# Patient Record
Sex: FEMALE | Race: ASIAN | NOT HISPANIC OR LATINO | Employment: UNEMPLOYED | URBAN - METROPOLITAN AREA
[De-identification: names, ages, dates, MRNs, and addresses within clinical notes are randomized per-mention and may not be internally consistent; named-entity substitution may affect disease eponyms.]

---

## 2017-01-25 ENCOUNTER — OFFICE VISIT (OUTPATIENT)
Dept: FAMILY MEDICINE | Facility: CLINIC | Age: 57
End: 2017-01-25
Payer: COMMERCIAL

## 2017-01-25 VITALS
SYSTOLIC BLOOD PRESSURE: 106 MMHG | HEIGHT: 55 IN | WEIGHT: 86.8 LBS | OXYGEN SATURATION: 97 % | BODY MASS INDEX: 20.09 KG/M2 | HEART RATE: 65 BPM | TEMPERATURE: 97.1 F | DIASTOLIC BLOOD PRESSURE: 59 MMHG

## 2017-01-25 DIAGNOSIS — Z12.11 SCREEN FOR COLON CANCER: ICD-10-CM

## 2017-01-25 DIAGNOSIS — Z12.31 VISIT FOR SCREENING MAMMOGRAM: ICD-10-CM

## 2017-01-25 DIAGNOSIS — R10.33 ABDOMINAL PAIN, PERIUMBILICAL: Primary | ICD-10-CM

## 2017-01-25 DIAGNOSIS — D50.8 OTHER IRON DEFICIENCY ANEMIA: ICD-10-CM

## 2017-01-25 DIAGNOSIS — F33.1 MODERATE EPISODE OF RECURRENT MAJOR DEPRESSIVE DISORDER (H): Primary | ICD-10-CM

## 2017-01-25 PROCEDURE — 99214 OFFICE O/P EST MOD 30 MIN: CPT | Performed by: FAMILY MEDICINE

## 2017-01-25 RX ORDER — FERROUS GLUCONATE 324(38)MG
324 TABLET ORAL 2 TIMES DAILY
Qty: 100 TABLET | Refills: 1 | Status: SHIPPED | OUTPATIENT
Start: 2017-01-25 | End: 2017-04-18

## 2017-01-25 RX ORDER — BUPROPION HYDROCHLORIDE 300 MG/1
TABLET ORAL
Qty: 90 TABLET | Refills: 1 | Status: SHIPPED | OUTPATIENT
Start: 2017-01-25 | End: 2017-07-13

## 2017-01-25 NOTE — PROGRESS NOTES
"  SUBJECTIVE:                                                    Gricelda Navarro is a 56 year old female who presents to clinic today for the following health issues:      Depression Followup    Status since last visit: Worsened     See PHQ-9 for current symptoms.  Other associated symptoms: when feeling sick depression worse, when feeling good depression better. Pain in right shoulder, arm and right side head    Complicating factors:   Significant life event:  No   Current substance abuse:  None  Anxiety or Panic symptoms:  No    PHQ-9  English PHQ-9   Any Language            Amount of exercise or physical activity: None    Problems taking medications regularly: No    Medication side effects: none    Diet: low salt    Iron deficiency - taking supplement most days and needs refill.    Problem list and histories reviewed & adjusted, as indicated.  Additional history: as documented    Problem list, Medication list, Allergies, and Medical/Social/Surgical histories reviewed in EPIC and updated as appropriate.    ROS:  Constitutional, HEENT, cardiovascular, pulmonary, gi and gu systems are negative, except as otherwise noted.    OBJECTIVE:                                                    /59 mmHg  Pulse 65  Temp(Src) 97.1  F (36.2  C) (Oral)  Ht 4' 6.53\" (1.385 m)  Wt 86 lb 12.8 oz (39.372 kg)  BMI 20.53 kg/m2  SpO2 97%  LMP 12/25/2013  Breastfeeding? No  Body mass index is 20.53 kg/(m^2).  GENERAL: healthy, alert and no distress  NECK: no adenopathy, no asymmetry, masses, or scars and thyroid normal to palpation  RESP: lungs clear to auscultation - no rales, rhonchi or wheezes  CV: regular rate and rhythm, normal S1 S2, no S3 or S4, no murmur, click or rub, no peripheral edema and peripheral pulses strong  ABDOMEN: soft, nontender, no hepatosplenomegaly, no masses and bowel sounds normal  MS: no gross musculoskeletal defects noted, no edema  PSYCH: mentation appears normal and affect flat    Diagnostic Test " Results:  none      ASSESSMENT/PLAN:                                                    1. Screen for colon cancer  screening  - Fecal colorectal cancer screen (FIT); Future    2. Visit for screening mammogram  screening  - MA SCREENING DIGITAL BILAT - Future  (s+30); Future    3. Moderate episode of recurrent major depressive disorder (H)  Patient requests to move back to wellbutrin.  She does not like how she feels on cymbalta.  - buPROPion (WELLBUTRIN XL) 300 MG 24 hr tablet; TAKE 1 TABLET BY MOUTH EVERY MORNING FOR DEPRESSION//IB HNUB NOJ 1 LUB THAUM SAWV NTXOV PAB JAHAIRA NYUAB SIAB  Dispense: 90 tablet; Refill: 1    4. Other iron deficiency anemia  Refilled.  - ferrous gluconate (FERGON) 324 (38 FE) MG tablet; Take 1 tablet (324 mg) by mouth 2 times daily  Dispense: 100 tablet; Refill: 1    The uses and side effects, including black box warnings as appropriate, were discussed in detail.  All patient questions were answered.  The patient was instructed to call immediately if any side effects developed.     FUTURE APPOINTMENTS:       - Follow-up visit in 3 - 6 months.  Discussed working to improve depression with medication and therapy.  Patient would like to hold at wellbutrin for now.    Marycarmen Cuba MD  [facility]

## 2017-01-25 NOTE — MR AVS SNAPSHOT
After Visit Summary   1/25/2017    Gricelda Navarro    MRN: [id number]           Patient Information     Date Of Birth          1960        Visit Information        Provider Department      1/25/2017 9:45 AM Marycarmen Taylor MD; [employer] [facility]        Today's Diagnoses     Moderate episode of recurrent major depressive disorder (H)    -  1     Screen for colon cancer         Visit for screening mammogram         Other iron deficiency anemia           Care Instructions    Based on your medical history and these are the current health maintenance or preventive care services that you are due for (some may have been done at this visit)  Health Maintenance Due   Topic Date Due     ADVANCE DIRECTIVE PLANNING Q5 YRS (NO INBASKET)  07/12/1979     HEPATITIS C SCREENING  07/12/1979     DEPRESSION ACTION PLAN Q1 YR (NO INBASKET)  07/28/2016     MAMMO SCREEN Q2 YR (SYSTEM ASSIGNED)  08/19/2016     INFLUENZA VACCINE (SYSTEM ASSIGNED)  09/01/2016     FIT Q1 YR (NO INBASKET)  11/05/2016         At [facility], we strive to deliver an exceptional experience to you, every time we see you.    If you receive a survey in the mail, please send us back your thoughts. We really do value your feedback.    Your care team's suggested websites for health information:  Www.[facility].org : Up to date and easily searchable information on multiple topics.  Www.medlineplus.gov : medication info, interactive tutorials, watch real surgeries online  Www.familydoctor.org : good info from the Academy of Family Physicians  Www.cdc.gov : public health info, travel advisories, epidemics (H1N1)  Www.aap.org : children's health info, normal development, vaccinations  Www.health.state.[place].us : [place] dept of health, public health issues in [place], N1N1    How to contact your care team:   Team Kayla/Spirit [phone]         Pharmacy [phone]    Dr. Robert,  "Diana Izquierdo PA-C, Dr. Francisco, Cathy JENKINS CNP, Elena Villalta PA-C, Dr. Kemp, and GREGORY Blunt CNP    Team RNs: Meseret & Maribel      Clinic hours  M-Th 7 am-7 pm   Fri 7 am-5 pm.   Urgent care M-F 11 am-9 pm,   Sat/Sun 9 am-5 pm.  Pharmacy M-Th 8 am-8 pm Fri 8 am-6 pm  Sat/Sun 9 am-5 pm.     All password changes, disabled accounts, or ID changes in [employer]/MyHealth will be done by our Access Services Department.    If you need help with your account or password, call: [phone]. Clinic staff no longer has the ability to change passwords.             Follow-ups after your visit        Future tests that were ordered for you today     Open Future Orders        Priority Expected Expires Ordered    MA SCREENING DIGITAL BILAT - Future  (s+30) Routine  1/25/2018 1/25/2017    Fecal colorectal cancer screen (FIT) Routine 2/15/2017 4/19/2017 1/25/2017            Who to contact     If you have questions or need follow up information about today's clinic visit or your schedule please contact [facility] directly at [phone].  Normal or non-critical lab and imaging results will be communicated to you by Napartnerhart, letter or phone within 4 business days after the clinic has received the results. If you do not hear from us within 7 days, please contact the clinic through Napartnerhart or phone. If you have a critical or abnormal lab result, we will notify you by phone as soon as possible.  Submit refill requests through [employer] or call your pharmacy and they will forward the refill request to us. Please allow 3 business days for your refill to be completed.          Additional Information About Your Visit        [employer] Information     [employer] lets you send messages to your doctor, view your test results, renew your prescriptions, schedule appointments and more. To sign up, go to [email or web address] . Click on \"Log in\" on the left side of the screen, which will take you to the Welcome " "page. Then click on \"Sign up Now\" on the right side of the page.     You will be asked to enter the access code listed below, as well as some personal information. Please follow the directions to create your username and password.     Your access code is: [email or web address]  Expires: 2017 10:19 AM     Your access code will  in 90 days. If you need help or a new code, please call your [place] clinic or [phone].        Care EveryWhere ID     This is your Care EveryWhere ID. This could be used by other organizations to access your [place] medical records  [id number]        Your Vitals Were     Pulse Temperature Height BMI (Body Mass Index) Pulse Oximetry Last Period    65 97.1  F (36.2  C) (Oral) 4' 6.53\" (1.385 m) 20.53 kg/m2 97% 2013    Breastfeeding?                   No            Blood Pressure from Last 3 Encounters:   17 106/59   11/15/16 137/76   16 105/78    Weight from Last 3 Encounters:   17 86 lb 12.8 oz (39.372 kg)   16 87 lb 14.4 oz (39.871 kg)   10/27/16 87 lb (39.463 kg)              We Performed the Following     DEPRESSION ACTION PLAN (DAP) Order [[id number]]          Today's Medication Changes          These changes are accurate as of: 17 10:19 AM.  If you have any questions, ask your nurse or doctor.               Start taking these medicines.        Dose/Directions    buPROPion 300 MG 24 hr tablet   Commonly known as:  WELLBUTRIN XL   Used for:  Moderate episode of recurrent major depressive disorder (H)   Started by:  Marycarmen Taylor MD        TAKE 1 TABLET BY MOUTH EVERY MORNING FOR DEPRESSION//IB HNUB NOJ 1 LUB THAUM SAWV NTXOV PAB JAHAIRA NYUAB SIAB   Quantity:  90 tablet   Refills:  1         Stop taking these medicines if you haven't already. Please contact your care team if you have questions.     DULoxetine 20 MG EC capsule   Commonly known as:  CYMBALTA   Stopped by:  Marycarmen Taylor MD                Where to get " your medicines      These medications were sent to [facility], [place] - [address]  [address], [place] [address]     Phone:  [phone]    - buPROPion 300 MG 24 hr tablet  - ferrous gluconate 324 (38 FE) MG tablet             Primary Care Provider Office Phone # Fax #    Marycarmen Cici Cuba [phone]       [facility] [address]  [facility] [address]        Thank you!     Thank you for choosing [facility]  for your care. Our goal is always to provide you with excellent care. Hearing back from our patients is one way we can continue to improve our services. Please take a few minutes to complete the written survey that you may receive in the mail after your visit with us. Thank you!             Your Updated Medication List - Protect others around you: Learn how to safely use, store and throw away your medicines at www.disposemymeds.org.          This list is accurate as of: 1/25/17 10:19 AM.  Always use your most recent med list.                   Brand Name Dispense Instructions for use    buPROPion 300 MG 24 hr tablet    WELLBUTRIN XL    90 tablet    TAKE 1 TABLET BY MOUTH EVERY MORNING FOR DEPRESSION//IB HNUB NOJ 1 LUB THAUM SAWV NTXOV PAB JAHAIRA NYUAB SIAB       cyclobenzaprine 5 MG tablet    FLEXERIL    20 tablet    Take 1 tablet (5 mg) by mouth nightly as needed       diclofenac 1 % Gel topical gel    VOLTAREN    100 g    Apply 2 grams to affected areas up to four times daily using enclosed dosing card.       ferrous gluconate 324 (38 FE) MG tablet    FERGON    100 tablet    Take 1 tablet (324 mg) by mouth 2 times daily       loratadine 10 MG ODT tab    CLARITIN REDITABS     Take 10 mg by mouth daily       omeprazole 40 MG capsule    priLOSEC    30 capsule    TAKE 1 CAPSULE BY MOUTH DAILY FOR STOMACH// IB HNUB NOJ IB LUB PAB JAHAIRA MOB PLAB/NCAUJ PLAB       * traMADol 50 MG tablet    ULTRAM    90 tablet     TAKE 1 TABLET BY MOUTH EVERY 6 HOURS AS NEEDED//IB ZAUG NOJ 1 LUB, 6 TEEV NOJ 1 ZAUG       * traMADol 50 MG tablet    ULTRAM    30 tablet    Take 1 tablet (50 mg) by mouth every 6 hours as needed for pain       triamcinolone 0.1 % cream    KENALOG    30 g    Apply sparingly to affected area three times daily for 14 days.  Take 1 week break then may repeat treatment.       vitamin D 2000 UNITS tablet     100 tablet    Take 2,000 Units by mouth daily       * Notice:  This list has 2 medication(s) that are the same as other medications prescribed for you. Read the directions carefully, and ask your doctor or other care provider to review them with you.

## 2017-01-25 NOTE — NURSING NOTE
"Chief Complaint   Patient presents with     Depression     follow up       Initial /59 mmHg  Pulse 65  Temp(Src) 97.1  F (36.2  C) (Oral)  Ht 4' 6.53\" (1.385 m)  Wt 86 lb 12.8 oz (39.372 kg)  BMI 20.53 kg/m2  SpO2 97%  LMP 12/25/2013  Breastfeeding? No Estimated body mass index is 20.53 kg/(m^2) as calculated from the following:    Height as of this encounter: 4' 6.53\" (1.385 m).    Weight as of this encounter: 86 lb 12.8 oz (39.372 kg).  BP completed using cuff size: vlad Zepeda MA      "

## 2017-01-25 NOTE — PATIENT INSTRUCTIONS
Based on your medical history and these are the current health maintenance or preventive care services that you are due for (some may have been done at this visit)  Health Maintenance Due   Topic Date Due     ADVANCE DIRECTIVE PLANNING Q5 YRS (NO INBASKET)  07/12/1979     HEPATITIS C SCREENING  07/12/1979     DEPRESSION ACTION PLAN Q1 YR (NO INBASKET)  07/28/2016     MAMMO SCREEN Q2 YR (SYSTEM ASSIGNED)  08/19/2016     INFLUENZA VACCINE (SYSTEM ASSIGNED)  09/01/2016     FIT Q1 YR (NO INBASKET)  11/05/2016         At [facility], we strive to deliver an exceptional experience to you, every time we see you.    If you receive a survey in the mail, please send us back your thoughts. We really do value your feedback.    Your care team's suggested websites for health information:  Www.[facility].org : Up to date and easily searchable information on multiple topics.  Www.medlineplus.gov : medication info, interactive tutorials, watch real surgeries online  Www.familydoctor.org : good info from the Academy of Family Physicians  Www.cdc.gov : public health info, travel advisories, epidemics (H1N1)  Www.aap.org : children's health info, normal development, vaccinations  Www.health.[facility].us : [place] dept of health, public health issues in [place], N1N1    How to contact your care team:   Carmen Garza/Oren [phone]         Pharmacy [phone]    Dr. Robert, Diana Izquierdo PA-C, Dr. Francisco, Cathy JENKINS CNP, Elena Villalta PA-C, Dr. Kemp, and GREGORY Blunt CNP    Team RNs: Meseret & Maribel      Clinic hours  M-Th 7 am-7 pm   Fri 7 am-5 pm.   Urgent care M-F 11 am-9 pm,   Sat/Sun 9 am-5 pm.  Pharmacy M-Th 8 am-8 pm Fri 8 am-6 pm  Sat/Sun 9 am-5 pm.     All password changes, disabled accounts, or ID changes in [employer]/MyHealth will be done by our Access Services Department.    If you need help with your account or password, call: [phone]. Clinic staff no longer has the ability  to change passwords.

## 2017-01-25 NOTE — TELEPHONE ENCOUNTER
omeprazole (PRILOSEC) 40 MG capsule      Last Written Prescription Date: 02/10/16  Last Fill Quantity: 30,  # refills: 11   Last Office Visit with [facility], [facility] or [facility] prescribing provider: 01/25/17      [facility]

## 2017-01-30 RX ORDER — OMEPRAZOLE 40 MG/1
CAPSULE, DELAYED RELEASE ORAL
Qty: 30 CAPSULE | Refills: 11 | Status: SHIPPED | OUTPATIENT
Start: 2017-01-30 | End: 2017-04-19

## 2017-01-30 NOTE — TELEPHONE ENCOUNTER
Prescription approved per [facility] Refill Protocol.  SNOW Holley, Clinical RN [facility].

## 2017-02-02 PROCEDURE — 82274 ASSAY TEST FOR BLOOD FECAL: CPT | Performed by: FAMILY MEDICINE

## 2017-02-03 DIAGNOSIS — Z12.11 SCREEN FOR COLON CANCER: ICD-10-CM

## 2017-02-05 LAB — HEMOCCULT STL QL IA: NEGATIVE

## 2017-02-06 NOTE — PROGRESS NOTES
Quick Note:    Ms. Navarro,    Your stool test was negative for blood. This decreases the likelihood of left sided colon cancer. You should have this test repeated yearly or have a colonoscopy done for total colon cancer screening.     Please contact the clinic if you have additional questions. Thank you.    Sincerely,    Marycarmen Cuba  ______

## 2017-04-18 ENCOUNTER — TELEPHONE (OUTPATIENT)
Dept: FAMILY MEDICINE | Facility: CLINIC | Age: 57
End: 2017-04-18

## 2017-04-18 DIAGNOSIS — K29.50 CHRONIC GASTRITIS, PRESENCE OF BLEEDING UNSPECIFIED, UNSPECIFIED GASTRITIS TYPE: Primary | ICD-10-CM

## 2017-04-18 DIAGNOSIS — D50.8 OTHER IRON DEFICIENCY ANEMIA: ICD-10-CM

## 2017-04-18 NOTE — TELEPHONE ENCOUNTER
Omeprazole not covered by insurance so routed to provider to change medication or start prior authorization.  Aracely TOMLINSON

## 2017-04-18 NOTE — TELEPHONE ENCOUNTER
ferrous gluconate (FERGON) 324 (38 FE) MG tablet        Last Written Prescription Date: 01/25/17  Last Fill Quantity: 100,    # refills: 1  Last Office Visit with [facility], [facility] or [facility] prescribing provider:  01/25/17        Lab Results   Component Value Date    WBC 5.0 05/02/2016     Lab Results   Component Value Date    RBC 4.46 05/02/2016     Lab Results   Component Value Date    HGB 12.1 05/02/2016     Lab Results   Component Value Date    HCT 36.7 05/02/2016     No components found for: MCT  Lab Results   Component Value Date    MCV 82 05/02/2016     Lab Results   Component Value Date    MCH 27.1 05/02/2016     Lab Results   Component Value Date    MCHC 33.0 05/02/2016     Lab Results   Component Value Date    RDW 16.4 05/02/2016     Lab Results   Component Value Date     05/02/2016     Lab Results   Component Value Date    AST 17 05/02/2016     Lab Results   Component Value Date    ALT 20 05/02/2016     Creatinine   Date Value Ref Range Status   05/02/2016 0.43 (L) 0.52 - 1.04 mg/dL Final         Ginna Blackburn  [facility]

## 2017-04-18 NOTE — TELEPHONE ENCOUNTER
Routing refill request to provider for review/approval because:  Labs out of range:  Creatinine  Luna Alexandre RN

## 2017-04-21 RX ORDER — FERROUS GLUCONATE 324(38)MG
TABLET ORAL
Qty: 90 TABLET | Refills: 1 | Status: SHIPPED | OUTPATIENT
Start: 2017-04-21 | End: 2017-07-13

## 2017-06-14 DIAGNOSIS — E55.9 VITAMIN D DEFICIENCY: ICD-10-CM

## 2017-06-14 NOTE — TELEPHONE ENCOUNTER
Cholecalciferol (VITAMIN D) 2000 UNITS tablet      Last Written Prescription Date: 05/26/16  Last Fill Quantity: 100,  # refills: 3   Last Office Visit with [facility], [facility] or [facility] prescribing provider: 01/25/17      [facility]

## 2017-06-19 RX ORDER — CHOLECALCIFEROL (VITAMIN D3) 50 MCG
TABLET ORAL
Qty: 100 TABLET | Refills: 3 | Status: SHIPPED | OUTPATIENT
Start: 2017-06-19 | End: 2018-06-20

## 2017-06-19 NOTE — TELEPHONE ENCOUNTER
Routing refill request to provider for review/approval because:  Possible due for recheck on vitamin D labs    Luna Alexandre RN

## 2017-11-08 ENCOUNTER — OFFICE VISIT (OUTPATIENT)
Dept: FAMILY MEDICINE | Facility: CLINIC | Age: 57
End: 2017-11-08
Payer: COMMERCIAL

## 2017-11-08 VITALS
HEART RATE: 74 BPM | DIASTOLIC BLOOD PRESSURE: 66 MMHG | SYSTOLIC BLOOD PRESSURE: 120 MMHG | WEIGHT: 81 LBS | OXYGEN SATURATION: 99 % | BODY MASS INDEX: 19.15 KG/M2 | TEMPERATURE: 97.2 F

## 2017-11-08 DIAGNOSIS — F33.1 MODERATE EPISODE OF RECURRENT MAJOR DEPRESSIVE DISORDER (H): ICD-10-CM

## 2017-11-08 DIAGNOSIS — Z23 NEED FOR PROPHYLACTIC VACCINATION AND INOCULATION AGAINST INFLUENZA: ICD-10-CM

## 2017-11-08 DIAGNOSIS — M79.651 PAIN IN BOTH THIGHS: ICD-10-CM

## 2017-11-08 DIAGNOSIS — Z12.31 VISIT FOR SCREENING MAMMOGRAM: ICD-10-CM

## 2017-11-08 DIAGNOSIS — Z11.59 NEED FOR HEPATITIS C SCREENING TEST: ICD-10-CM

## 2017-11-08 DIAGNOSIS — D50.8 OTHER IRON DEFICIENCY ANEMIA: ICD-10-CM

## 2017-11-08 DIAGNOSIS — M79.652 PAIN IN BOTH THIGHS: ICD-10-CM

## 2017-11-08 DIAGNOSIS — E55.9 VITAMIN D DEFICIENCY: ICD-10-CM

## 2017-11-08 DIAGNOSIS — K62.89 RECTAL MASS: Primary | ICD-10-CM

## 2017-11-08 DIAGNOSIS — K64.4 EXTERNAL HEMORRHOIDS: ICD-10-CM

## 2017-11-08 LAB
ALBUMIN SERPL-MCNC: 4 G/DL (ref 3.4–5)
ALP SERPL-CCNC: 104 U/L (ref 40–150)
ALT SERPL W P-5'-P-CCNC: 27 U/L (ref 0–50)
ANION GAP SERPL CALCULATED.3IONS-SCNC: 6 MMOL/L (ref 3–14)
AST SERPL W P-5'-P-CCNC: 19 U/L (ref 0–45)
BILIRUB SERPL-MCNC: 0.5 MG/DL (ref 0.2–1.3)
BUN SERPL-MCNC: 11 MG/DL (ref 7–30)
CALCIUM SERPL-MCNC: 8.8 MG/DL (ref 8.5–10.1)
CHLORIDE SERPL-SCNC: 107 MMOL/L (ref 94–109)
CK SERPL-CCNC: 72 U/L (ref 30–225)
CO2 SERPL-SCNC: 27 MMOL/L (ref 20–32)
CREAT SERPL-MCNC: 0.45 MG/DL (ref 0.52–1.04)
GFR SERPL CREATININE-BSD FRML MDRD: >90 ML/MIN/1.7M2
GLUCOSE SERPL-MCNC: 90 MG/DL (ref 70–99)
HGB BLD-MCNC: 13 G/DL (ref 11.7–15.7)
POTASSIUM SERPL-SCNC: 3.7 MMOL/L (ref 3.4–5.3)
PROT SERPL-MCNC: 8.1 G/DL (ref 6.8–8.8)
SODIUM SERPL-SCNC: 140 MMOL/L (ref 133–144)

## 2017-11-08 PROCEDURE — 99214 OFFICE O/P EST MOD 30 MIN: CPT | Mod: 25 | Performed by: FAMILY MEDICINE

## 2017-11-08 PROCEDURE — 90686 IIV4 VACC NO PRSV 0.5 ML IM: CPT | Performed by: FAMILY MEDICINE

## 2017-11-08 PROCEDURE — 80053 COMPREHEN METABOLIC PANEL: CPT | Performed by: FAMILY MEDICINE

## 2017-11-08 PROCEDURE — 86803 HEPATITIS C AB TEST: CPT | Performed by: FAMILY MEDICINE

## 2017-11-08 PROCEDURE — 82306 VITAMIN D 25 HYDROXY: CPT | Performed by: FAMILY MEDICINE

## 2017-11-08 PROCEDURE — 36415 COLL VENOUS BLD VENIPUNCTURE: CPT | Performed by: FAMILY MEDICINE

## 2017-11-08 PROCEDURE — 82550 ASSAY OF CK (CPK): CPT | Performed by: FAMILY MEDICINE

## 2017-11-08 PROCEDURE — 85018 HEMOGLOBIN: CPT | Performed by: FAMILY MEDICINE

## 2017-11-08 PROCEDURE — 90471 IMMUNIZATION ADMIN: CPT | Performed by: FAMILY MEDICINE

## 2017-11-08 RX ORDER — BUPROPION HYDROCHLORIDE 150 MG/1
150 TABLET ORAL EVERY MORNING
Qty: 90 TABLET | Refills: 0 | Status: SHIPPED | OUTPATIENT
Start: 2017-11-08 | End: 2018-02-02

## 2017-11-08 RX ORDER — FERROUS GLUCONATE 324(38)MG
TABLET ORAL
Qty: 90 TABLET | Refills: 1 | Status: SHIPPED | OUTPATIENT
Start: 2017-11-08 | End: 2019-01-09

## 2017-11-08 ASSESSMENT — PATIENT HEALTH QUESTIONNAIRE - PHQ9: SUM OF ALL RESPONSES TO PHQ QUESTIONS 1-9: 19

## 2017-11-08 NOTE — MR AVS SNAPSHOT
After Visit Summary   11/8/2017    Gricelda Navarro    MRN: [id number]           Patient Information     Date Of Birth          1960        Visit Information        Provider Department      11/8/2017 10:15 AM Marycarmen Taylor MD; [employer] [facility]        Today's Diagnoses     Rectal mass    -  1    Pain in both thighs        Moderate episode of recurrent major depressive disorder (H)        Other iron deficiency anemia        Vitamin D deficiency        External hemorrhoids        Visit for screening mammogram        Need for hepatitis C screening test        Need for prophylactic vaccination and inoculation against influenza          Care Instructions    Based on your medical history and these are the current health maintenance or preventive care services that you are due for (some may have been done at this visit)  Health Maintenance Due   Topic Date Due     HEPATITIS C SCREENING  07/12/1979     ADVANCE DIRECTIVE PLANNING Q5 YRS  07/12/2016     MAMMO SCREEN Q2 YR (SYSTEM ASSIGNED)  08/19/2016     PHQ-9 Q6 MONTHS  04/27/2017     INFLUENZA VACCINE (SYSTEM ASSIGNED)  09/01/2017         At [facility], we strive to deliver an exceptional experience to you, every time we see you.    If you receive a survey in the mail, please send us back your thoughts. We really do value your feedback.    Your care team's suggested websites for health information:  Www.[place].org : Up to date and easily searchable information on multiple topics.  Www.medlineplus.gov : medication info, interactive tutorials, watch real surgeries online  Www.familydoctor.org : good info from the Academy of Family Physicians  Www.cdc.gov : public health info, travel advisories, epidemics (H1N1)  Www.aap.org : children's health info, normal development, vaccinations  Www.health.state.[place].us : [place] dept of health, public health issues in [place], N1N1    How to contact your  care team:   Team Kayla/Oren [phone]         Pharmacy [phone]    Dr. Robert, Diana Izquierdo PA-C, Dr. Francisco, Cathy JENKINS CNP, Elena Villalta PA-C, Dr. Kemp, and GREGORY Blunt CNP    Team RN: Maribel      Clinic hours  M-Th 7 am-7 pm   Fri 7 am-5 pm.   Urgent care M-F 11 am-9 pm,   Sat/Sun 9 am-5 pm.  Pharmacy M-Th 8 am-8 pm Fri 8 am-6 pm  Sat/Sun 9 am-5 pm.     All password changes, disabled accounts, or ID changes in [employer]MyHealth will be done by our Access Services Department.    If you need help with your account or password, call: [phone]. Clinic staff no longer has the ability to change passwords.             Follow-ups after your visit        Additional Services     GASTROENTEROLOGY ADULT REF PROCEDURE ONLY       Last Lab Result: Creatinine (mg/dL)       Date                     Value                 05/02/2016               0.43 (L)         ----------  Body mass index is 19.15 kg/(m^2).     Needed:  Yes  Language:  Hmong    Patient will be contacted to schedule procedure.     Please be aware that coverage of these services is subject to the terms and limitations of your health insurance plan.  Call member services at your health plan with any benefit or coverage questions.  Any procedures must be performed at a [facility] facility OR coordinated by your clinic's referral office.    Please bring the following with you to your appointment:    (1) Any X-Rays, CTs or MRIs which have been performed.  Contact the facility where they were done to arrange for  prior to your scheduled appointment.    (2) List of current medications   (3) This referral request   (4) Any documents/labs given to you for this referral                  Future tests that were ordered for you today     Open Future Orders        Priority Expected Expires Ordered    MA SCREENING DIGITAL BILAT - Future  (s+30) Routine  11/8/2018 11/8/2017            Who to contact     If you have  "questions or need follow up information about today's clinic visit or your schedule please contact [facility] [place] directly at [phone].  Normal or non-critical lab and imaging results will be communicated to you by MyChart, letter or phone within 4 business days after the clinic has received the results. If you do not hear from us within 7 days, please contact the clinic through [employer] or phone. If you have a critical or abnormal lab result, we will notify you by phone as soon as possible.  Submit refill requests through [employer] or call your pharmacy and they will forward the refill request to us. Please allow 3 business days for your refill to be completed.          Additional Information About Your Visit        [employer] Information     [employer] lets you send messages to your doctor, view your test results, renew your prescriptions, schedule appointments and more. To sign up, go to [email or web address] . Click on \"Log in\" on the left side of the screen, which will take you to the Welcome page. Then click on \"Sign up Now\" on the right side of the page.     You will be asked to enter the access code listed below, as well as some personal information. Please follow the directions to create your username and password.     Your access code is: [email or web address]  Expires: 2018 10:27 AM     Your access code will  in 90 days. If you need help or a new code, please call your [facility] clinic or [phone].        Care EveryWhere ID     This is your Care EveryWhere ID. This could be used by other organizations to access your [facility] medical records  [id number]        Your Vitals Were     Pulse Temperature Last Period Pulse Oximetry Breastfeeding? BMI (Body Mass Index)    74 97.2  F (36.2  C) (Oral) 2013 99% No 19.15 kg/m2       Blood Pressure from Last 3 Encounters:   17 120/66   17 106/59   11/15/16 137/76    Weight from Last 3 Encounters:   17 81 lb (36.7 kg) "   01/25/17 86 lb 12.8 oz (39.4 kg)   11/04/16 87 lb 14.4 oz (39.9 kg)              We Performed the Following     CK total     Comprehensive metabolic panel     FLU VAC, SPLIT VIRUS IM > 3 YO (QUADRIVALENT) [18231]     GASTROENTEROLOGY ADULT REF PROCEDURE ONLY     Hemoglobin     Vaccine Administration, Initial [44089]     Vitamin D Deficiency          Today's Medication Changes          These changes are accurate as of: 11/8/17 11:06 AM.  If you have any questions, ask your nurse or doctor.               These medicines have changed or have updated prescriptions.        Dose/Directions    buPROPion 150 MG 24 hr tablet   Commonly known as:  WELLBUTRIN XL   This may have changed:  See the new instructions.   Used for:  Moderate episode of recurrent major depressive disorder (H)   Changed by:  Marycarmen Taylor MD        Dose:  150 mg   Take 1 tablet (150 mg) by mouth every morning   Quantity:  90 tablet   Refills:  0       ferrous gluconate 324 (38 FE) MG tablet   Commonly known as:  FERGON   This may have changed:  See the new instructions.   Used for:  Other iron deficiency anemia   Changed by:  Marycarmen Taylor MD        TAKE 1 TABLET BY MOUTH TWICE DAILY // IB ZAUG NOJ IB LUB, IB HNUB NOJ OB ZAUG PAB JAHAIRA NTSHAV LIAB   Quantity:  90 tablet   Refills:  1            Where to get your medicines      These medications were sent to [facility], [place] - [address]  [address], [facility] [place] [address]     Phone:  [phone]     buPROPion 150 MG 24 hr tablet    ferrous gluconate 324 (38 FE) MG tablet                Primary Care Provider Office Phone # Fax #    Marycarmen Cuba [phone]       [address]  [facility] [address]        Equal Access to Services     JAYLENE GILL : Osmany Zhao, waaxda luqadaha, qaybta kaalmada adeegyaabraham, julien perez. So [facility]  [phone].    ATENCIÓN: Si verito mcdermott, tiene a hernandez disposición servicios gratuitos de asistencia lingüística. Ro rossi [phone].    We comply with applicable federal civil rights laws and [place] laws. We do not discriminate on the basis of race, color, national origin, age, disability, sex, sexual orientation, or gender identity.            Thank you!     Thank you for choosing [facility]  for your care. Our goal is always to provide you with excellent care. Hearing back from our patients is one way we can continue to improve our services. Please take a few minutes to complete the written survey that you may receive in the mail after your visit with us. Thank you!             Your Updated Medication List - Protect others around you: Learn how to safely use, store and throw away your medicines at www.disposemymeds.org.          This list is accurate as of: 11/8/17 11:06 AM.  Always use your most recent med list.                   Brand Name Dispense Instructions for use Diagnosis    buPROPion 150 MG 24 hr tablet    WELLBUTRIN XL    90 tablet    Take 1 tablet (150 mg) by mouth every morning    Moderate episode of recurrent major depressive disorder (H)       cyclobenzaprine 5 MG tablet    FLEXERIL    20 tablet    Take 1 tablet (5 mg) by mouth nightly as needed    Myalgia and myositis       diclofenac 1 % Gel topical gel    VOLTAREN    100 g    Apply 2 grams to affected areas up to four times daily using enclosed dosing card.    Back muscle spasm       ferrous gluconate 324 (38 FE) MG tablet    FERGON    90 tablet    TAKE 1 TABLET BY MOUTH TWICE DAILY // IB ZAUG NOJ IB LUB, IB HNUB NOJ OB ZAUG PAB JAHAIRA NTSHAV LIAB    Other iron deficiency anemia       loratadine 10 MG ODT tab    CLARITIN REDITABS     Take 10 mg by mouth daily        ranitidine 300 MG tablet    ZANTAC    90 tablet    Take 1 tablet (300 mg) by mouth At Bedtime    Chronic gastritis, presence of bleeding unspecified,  unspecified gastritis type       traMADol 50 MG tablet    ULTRAM    30 tablet    Take 1 tablet (50 mg) by mouth every 6 hours as needed for pain    Cervical radiculopathy       triamcinolone 0.1 % cream    KENALOG    30 g    Apply sparingly to affected area three times daily for 14 days.  Take 1 week break then may repeat treatment.    Intrinsic eczema       vitamin D 2000 UNITS tablet     100 tablet    TAKE 1 TABLET BY MOUTH ONCE DAILY // IB HNUB NOJ IB LUB    Vitamin D deficiency

## 2017-11-08 NOTE — LETTER
[address] [address]  [phone]                                                                                                           Gricelda Navarro  [address]   [place] [address]    November 10, 2017    Ms. Navarro,     All of your labs were normal for you except your vitamin D is low which can cause depressed mood.   Your Vitamin D level is deficient.  Start taking 50,000 units of vitamin D 3 weekly for 8 week then start 2000 units of vitamin D3 daily with calcium intake of 1200 mg.  Vitamin D is important in preventing bone loss, may reduce fatigue and may help to preventing certain cancers. I have sent a prescription to your pharmacy.  Be sure to have this rechecked in 2 months.     Please contact the clinic if you have additional questions.  Thank you.     Sincerely,     Marycarmen Cuba/cynthia    Results for orders placed or performed in visit on 11/08/17   Hepatitis C Screen Reflex to HCV RNA Quant and Genotype   Result Value Ref Range    Hepatitis C Antibody Nonreactive NR^Nonreactive   CK total   Result Value Ref Range    CK Total 72 30 - 225 U/L   Comprehensive metabolic panel   Result Value Ref Range    Sodium 140 133 - 144 mmol/L    Potassium 3.7 3.4 - 5.3 mmol/L    Chloride 107 94 - 109 mmol/L    Carbon Dioxide 27 20 - 32 mmol/L    Anion Gap 6 3 - 14 mmol/L    Glucose 90 70 - 99 mg/dL    Urea Nitrogen 11 7 - 30 mg/dL    Creatinine 0.45 (L) 0.52 - 1.04 mg/dL    GFR Estimate >90 >60 mL/min/1.7m2    GFR Estimate If Black >90 >60 mL/min/1.7m2    Calcium 8.8 8.5 - 10.1 mg/dL    Bilirubin Total 0.5 0.2 - 1.3 mg/dL    Albumin 4.0 3.4 - 5.0 g/dL    Protein Total 8.1 6.8 - 8.8 g/dL    Alkaline Phosphatase 104 40 - 150 U/L    ALT 27 0 - 50 U/L    AST 19 0 - 45 U/L   Hemoglobin   Result Value Ref Range    Hemoglobin 13.0 11.7 - 15.7 g/dL   Vitamin D Deficiency   Result Value Ref Range    Vitamin D Deficiency screening 14 (L) 20  - 75 ug/L

## 2017-11-08 NOTE — PROGRESS NOTES
"  SUBJECTIVE:   Gricelda Navarro is a 57 year old female who presents to clinic today for the following health issues:      The  verbalize all the notes below: KTTS    Joint Pain/Hip    Onset: November/2016     Description:   Location: both legs/thighs  Character: Sharp and Dull ache - only at night    Intensity: moderate    Progression of Symptoms: intermittent    Accompanying Signs & Symptoms:  Other symptoms: radiation of pain from colon to both legs    History:   Previous similar pain: no       Precipitating factors:   Trauma or overuse: no     Alleviating factors:  Improved by: nothing    Therapies Tried and outcome: na          Concern - Mass   Onset: Several days ago    Description:   Pt verbalize that several days ago, she felt a mass on her colon, she touched it and have a feeling of \"it \" vibrating and giving her some type of leg pain. She pushed a finger into the rectum and felt a mass.  Occasional rectal pain over the last 1.5 years.    Intensity: moderate    Progression of Symptoms:  constant    Accompanying Signs & Symptoms:  No constipation, no diarrhea, no itching or pain    Previous history of similar problem:   na    Precipitating factors:   Worsened by: na    Alleviating factors:  Improved by: na    Therapies Tried and outcome: na    Depressed mood - Has moved in with daughter as previous apartment was sold.  She does not like having to live with her daughter and this is worsening her depression. Has not been on wellbutrin since September.    History of vitamin D deficiency and has not been taking this supplement for months.    Problem list and histories reviewed & adjusted, as indicated.  Additional history: as documented    Patient Active Problem List   Diagnosis     Recurrent cold sores     Gastritis     Back pain     Abdominal pain, acute, periumbilical     Major depression     Generalized anxiety disorder     Seasonal allergic rhinitis     Iron deficiency anemia     Excessive or frequent " menstruation     Guaiac positive stools     CARDIOVASCULAR SCREENING; LDL GOAL LESS THAN 160     Vitamin D deficiency     Past Surgical History:   Procedure Laterality Date     C/SECTION, LOW TRANSVERSE      , Low Transverse     TUBAL LIGATION         Social History   Substance Use Topics     Smoking status: Never Smoker     Smokeless tobacco: Never Used     Alcohol use No     Family History   Problem Relation Age of Onset     Unknown/Adopted Mother      Unknown/Adopted Father              Reviewed and updated as needed this visit by clinical staffTobacco  Allergies  Meds  Problems  Med Hx  Surg Hx  Fam Hx  Soc Hx        Reviewed and updated as needed this visit by Provider  Allergies  Meds  Problems         ROS:  Constitutional, HEENT, cardiovascular, pulmonary, GI, , musculoskeletal, neuro, skin, endocrine and psych systems are negative, except as otherwise noted.      OBJECTIVE:   /66 (BP Location: Right arm, Patient Position: Chair, Cuff Size: Adult Small)  Pulse 74  Temp 97.2  F (36.2  C) (Oral)  Wt 81 lb (36.7 kg)  LMP 2013  SpO2 99%  Breastfeeding? No  BMI 19.15 kg/m2  Body mass index is 19.15 kg/(m^2).  GENERAL: healthy, alert and no distress  NECK: no adenopathy, no asymmetry, masses, or scars and thyroid normal to palpation  RESP: lungs clear to auscultation - no rales, rhonchi or wheezes  CV: regular rate and rhythm, normal S1 S2, no S3 or S4, no murmur, click or rub, no peripheral edema and peripheral pulses strong  ABDOMEN: soft, nontender, no hepatosplenomegaly, no masses and bowel sounds normal  RECTAL (female): sphincter tone normal, external hemorrhoid and likely uterine prolapse into colon  MS: no gross musculoskeletal defects noted, no edema  PSYCH: mentation appears normal and affect flat    Diagnostic Test Results:  none     ASSESSMENT/PLAN:     1. Rectal mass  Referral for colonoscopy due to previous positive Guaiac and mass concerns.  - GASTROENTEROLOGY  ADULT REF PROCEDURE ONLY    2. Pain in both thighs  Possible etiologies discussed - check labs  - CK total  - Comprehensive metabolic panel    3. Moderate episode of recurrent major depressive disorder (H)  Not controlled - restart wellbutrin and check labs  - buPROPion (WELLBUTRIN XL) 150 MG 24 hr tablet; Take 1 tablet (150 mg) by mouth every morning  Dispense: 90 tablet; Refill: 0  - Vitamin D Deficiency    4. Other iron deficiency anemia  Restart and check hemoglobin status  - ferrous gluconate (FERGON) 324 (38 FE) MG tablet; TAKE 1 TABLET BY MOUTH TWICE DAILY // IB ZAUG NOJ IB LUB, IB HNUB NOJ OB ZAUG PAB JAHAIRA NTSHAV LIAB  Dispense: 90 tablet; Refill: 1  - Hemoglobin    5. Vitamin D deficiency  Recheck as has not been supplementing.  - Vitamin D Deficiency    6. External hemorrhoids  Discussed cause and recommended fiber    7. Visit for screening mammogram  screening  - MA SCREENING DIGITAL BILAT - Future  (s+30); Future    8. Need for hepatitis C screening test  screening  - Hepatitis C Screen Reflex to HCV RNA Quant and Genotype; Future  - Hepatitis C Screen Reflex to HCV RNA Quant and Genotype    9. Need for prophylactic vaccination and inoculation against influenza    - FLU VAC, SPLIT VIRUS IM > 3 YO (QUADRIVALENT) [47713]  - Vaccine Administration, Initial [36589]    The uses and side effects, including black box warnings as appropriate, were discussed in detail.  All patient questions were answered.  The patient was instructed to call immediately if any side effects developed.     FUTURE APPOINTMENTS:       - Follow-up visit in 1 month.    Marycarmen Cuba MD  [facility]    Injectable Influenza Immunization Documentation    1.  Is the person to be vaccinated sick today?   No    2. Does the person to be vaccinated have an allergy to a component   of the vaccine?   No  Egg Allergy Algorithm Link    3. Has the person to be vaccinated ever had a serious reaction   to influenza  vaccine in the past?   No    4. Has the person to be vaccinated ever had Guillain-Barré syndrome?   No    Form completed by Josiane,JACLYN (AMAA)

## 2017-11-08 NOTE — NURSING NOTE
"Chief Complaint   Patient presents with     Mass     Colon       Initial /66 (BP Location: Right arm, Patient Position: Chair, Cuff Size: Adult Small)  Pulse 74  Temp 97.2  F (36.2  C) (Oral)  Wt 81 lb (36.7 kg)  LMP 12/25/2013  SpO2 99%  Breastfeeding? No  BMI 19.15 kg/m2 Estimated body mass index is 19.15 kg/(m^2) as calculated from the following:    Height as of 1/25/17: 4' 6.53\" (1.385 m).    Weight as of this encounter: 81 lb (36.7 kg).  Medication Reconciliation: complete   An,CMA (AMAA)      "

## 2017-11-09 LAB
DEPRECATED CALCIDIOL+CALCIFEROL SERPL-MC: 14 UG/L (ref 20–75)
HCV AB SERPL QL IA: NONREACTIVE

## 2017-11-09 NOTE — PROGRESS NOTES
Ms. Navarro,    All of your labs were normal for you except your vitamin D is low which can cause depressed mood.  Your Vitamin D level is deficient.  Start taking 50,000 units of vitamin D 3 weekly for 8 week then start 2000 units of vitamin D3 daily with calcium intake of 1200 mg.  Vitamin D is important in preventing bone loss, may reduce fatigue and may help to preventing certain cancers. I have sent a prescription to your pharmacy.  Be sure to have this rechecked in 2 months.     Please contact the clinic if you have additional questions.  Thank you.    Sincerely,    Marycarmen Cuba

## 2017-11-20 ENCOUNTER — HOSPITAL ENCOUNTER (OUTPATIENT)
Facility: AMBULATORY SURGERY CENTER | Age: 57
Discharge: HOME | End: 2017-11-20
Attending: SPECIALIST | Admitting: SPECIALIST
Payer: COMMERCIAL

## 2017-11-20 ENCOUNTER — SURGERY (OUTPATIENT)
Age: 57
End: 2017-11-20

## 2017-11-20 VITALS
HEART RATE: 82 BPM | DIASTOLIC BLOOD PRESSURE: 74 MMHG | SYSTOLIC BLOOD PRESSURE: 104 MMHG | RESPIRATION RATE: 16 BRPM | OXYGEN SATURATION: 100 % | TEMPERATURE: 98.5 F

## 2017-11-20 PROCEDURE — 45378 DIAGNOSTIC COLONOSCOPY: CPT

## 2017-11-20 PROCEDURE — G8918 PT W/O PREOP ORDER IV AB PRO: HCPCS

## 2017-11-20 PROCEDURE — G8907 PT DOC NO EVENTS ON DISCHARG: HCPCS

## 2017-11-20 RX ORDER — LIDOCAINE 40 MG/G
CREAM TOPICAL
Status: DISCONTINUED | OUTPATIENT
Start: 2017-11-20 | End: 2017-11-21 | Stop reason: HOSPADM

## 2017-11-20 RX ORDER — FENTANYL CITRATE 50 UG/ML
INJECTION, SOLUTION INTRAMUSCULAR; INTRAVENOUS PRN
Status: DISCONTINUED | OUTPATIENT
Start: 2017-11-20 | End: 2017-11-20 | Stop reason: HOSPADM

## 2017-11-20 RX ORDER — ONDANSETRON 2 MG/ML
4 INJECTION INTRAMUSCULAR; INTRAVENOUS
Status: DISCONTINUED | OUTPATIENT
Start: 2017-11-20 | End: 2017-11-21 | Stop reason: HOSPADM

## 2017-11-20 RX ADMIN — FENTANYL CITRATE 50 MCG: 50 INJECTION, SOLUTION INTRAMUSCULAR; INTRAVENOUS at 08:29

## 2017-11-20 RX ADMIN — FENTANYL CITRATE 25 MCG: 50 INJECTION, SOLUTION INTRAMUSCULAR; INTRAVENOUS at 08:40

## 2017-11-20 NOTE — H&P
Pre-Endoscopy History and Physical     Gricelda Navarro MRN# [id number]   YOB: 1960 Age: 57 year old     Date of Procedure: 2017  Primary care provider: Marycarmen Taylor  Type of Endoscopy: Colonoscopy with possible biopsy, possible polypectomy  Reason for Procedure: screening, mass on digital rectal exam.   Type of Anesthesia Anticipated: Conscious Sedation    HPI:    Gricelda is a 57 year old female who will be undergoing the above procedure.      A history and physical has been performed. The patient's medications and allergies have been reviewed. The risks and benefits of the procedure and the sedation options and risks were discussed with the patient.  All questions were answered and informed consent was obtained.      She denies a personal or family history of anesthesia complications or bleeding disorders.     Patient Active Problem List   Diagnosis     Recurrent cold sores     Gastritis     Back pain     Abdominal pain, acute, periumbilical     Major depression     Generalized anxiety disorder     Seasonal allergic rhinitis     Iron deficiency anemia     Excessive or frequent menstruation     Guaiac positive stools     CARDIOVASCULAR SCREENING; LDL GOAL LESS THAN 160     Vitamin D deficiency     External hemorrhoids        Past Medical History:   Diagnosis Date     Abdominal pain, acute, periumbilical     following tubal ligation     Back pain      KELSIE (generalised anxiety disorder)      Gastritis      Iron deficiency anemia      Major depression      Recurrent cold sores      Seasonal allergic rhinitis         Past Surgical History:   Procedure Laterality Date     C/SECTION, LOW TRANSVERSE      , Low Transverse     TUBAL LIGATION         Social History   Substance Use Topics     Smoking status: Never Smoker     Smokeless tobacco: Never Used     Alcohol use No       Family History   Problem Relation Age of Onset     Unknown/Adopted Mother      Unknown/Adopted Father         Prior to Admission medications    Medication Sig Start Date End Date Taking? Authorizing Provider   cholecalciferol (VITAMIN D3) 27471 UNITS capsule Take 1 capsule (50,000 Units) by mouth once a week 11/9/17  Yes Marycarmen Taylor MD   buPROPion (WELLBUTRIN XL) 150 MG 24 hr tablet Take 1 tablet (150 mg) by mouth every morning 11/8/17  Yes Marycarmen Taylor MD   ferrous gluconate (FERGON) 324 (38 FE) MG tablet TAKE 1 TABLET BY MOUTH TWICE DAILY // IB ZAUG NOJ IB LUB, IB HNUB NOJ OB ZAUG PAB JAHAIRA NTSHAV LIAB 11/8/17  Yes Marycarmen Taylor MD   Cholecalciferol (VITAMIN D) 2000 UNITS tablet TAKE 1 TABLET BY MOUTH ONCE DAILY // IB HNUB NOJ IB LUB 6/19/17  Yes Marycarmen Taylor MD   ranitidine (ZANTAC) 300 MG tablet Take 1 tablet (300 mg) by mouth At Bedtime 4/19/17  Yes Marycarmen Taylor MD   cyclobenzaprine (FLEXERIL) 5 MG tablet Take 1 tablet (5 mg) by mouth nightly as needed 5/2/16  Yes Pooja Francisco MD   loratadine (CLARITIN REDITABS) 10 MG dissolvable tablet Take 10 mg by mouth daily   Yes Reported, Patient   traMADol (ULTRAM) 50 MG tablet Take 1 tablet (50 mg) by mouth every 6 hours as needed for pain 11/15/16   Andrea Ramsey,    diclofenac (VOLTAREN) 1 % GEL Apply 2 grams to affected areas up to four times daily using enclosed dosing card. 5/26/16   Pooja Francisco MD   triamcinolone (KENALOG) 0.1 % cream Apply sparingly to affected area three times daily for 14 days.  Take 1 week break then may repeat treatment. 11/2/15   Marycarmen Taylor MD       Allergies   Allergen Reactions     Prednisone Hives     Cortisone Itching, Swelling and Rash     Cyclobenzaprine Rash        REVIEW OF SYSTEMS:   5 point ROS negative except as noted above in HPI, including Gen., Resp., CV, GI &  system review.    PHYSICAL EXAM:   /65  Temp 98.5  F (36.9  C) (Temporal)  Resp 18  LMP 12/25/2013  SpO2 100% Estimated body mass index  "is 19.15 kg/(m^2) as calculated from the following:    Height as of 1/25/17: 1.385 m (4' 6.53\").    Weight as of 11/8/17: 36.7 kg (81 lb).   GENERAL APPEARANCE: alert, and oriented  MENTAL STATUS: alert  AIRWAY EXAM: Mallampatti Class I (visualization of the soft palate, fauces, uvula, anterior and posterior pillars)  RESP: lungs clear to auscultation - no rales, rhonchi or wheezes  CV: regular rates and rhythm  DIAGNOSTICS:    Not indicated    IMPRESSION   ASA Class 2 - Mild systemic disease    PLAN:   Plan for Colonoscopy with possible biopsy, possible polypectomy. We discussed the risks, benefits and alternatives and the patient wished to proceed.    The above has been forwarded to the consulting provider.      Signed Electronically by: John Aguilar  November 20, 2017          "

## 2017-11-20 NOTE — BRIEF OP NOTE
[facility] Brief Operative Note    Pre-operative diagnosis: colonoscopy/Dr. Destiney Cuba/Rectal mass [K62.9]  - Primary /BMi: [facility]: [phone]- read endoscopy script    Post-operative diagnosis extrinsic rectal mass,      Procedure: Procedure(s):  colonoscopy/Dr. Destiney Cuba/Rectal mass [K62.9]  - Primary /BMi: [facility]: [phone] - Wound Class: II-Clean Contaminated   Surgeon(s): Surgeon(s) and Role:     * John Aguilar MD - Primary   Estimated blood loss: * No values recorded between 11/20/2017  8:25 AM and 11/20/2017  8:54 AM *    Specimens: * No specimens in log *   Findings: Please see ProVation procedure note in Chart Review

## 2017-11-22 ENCOUNTER — OFFICE VISIT (OUTPATIENT)
Dept: FAMILY MEDICINE | Facility: CLINIC | Age: 57
End: 2017-11-22
Payer: COMMERCIAL

## 2017-11-22 VITALS
WEIGHT: 82.2 LBS | HEART RATE: 61 BPM | SYSTOLIC BLOOD PRESSURE: 110 MMHG | HEIGHT: 55 IN | OXYGEN SATURATION: 100 % | BODY MASS INDEX: 19.03 KG/M2 | TEMPERATURE: 97.2 F | DIASTOLIC BLOOD PRESSURE: 64 MMHG

## 2017-11-22 DIAGNOSIS — K64.8 INTERNAL HEMORRHOIDS: ICD-10-CM

## 2017-11-22 DIAGNOSIS — N81.11 MIDLINE CYSTOCELE: Primary | ICD-10-CM

## 2017-11-22 DIAGNOSIS — K62.89 RECTAL MASS: ICD-10-CM

## 2017-11-22 PROCEDURE — 99214 OFFICE O/P EST MOD 30 MIN: CPT | Performed by: FAMILY MEDICINE

## 2017-11-22 RX ORDER — HYDROCORTISONE ACETATE 25 MG/1
25 SUPPOSITORY RECTAL 2 TIMES DAILY
Qty: 28 SUPPOSITORY | Refills: 1 | Status: SHIPPED | OUTPATIENT
Start: 2017-11-22 | End: 2018-01-05

## 2017-11-22 NOTE — NURSING NOTE
"Chief Complaint   Patient presents with     RECHECK     Follow up on Colonoscopy       Initial /64 (BP Location: Left arm, Patient Position: Chair, Cuff Size: Adult Regular)  Pulse 61  Temp 97.2  F (36.2  C) (Oral)  Ht 4' 6.53\" (1.385 m)  Wt 82 lb 3.2 oz (37.3 kg)  LMP 12/25/2013  SpO2 100%  Breastfeeding? No  BMI 19.44 kg/m2 Estimated body mass index is 19.44 kg/(m^2) as calculated from the following:    Height as of this encounter: 4' 6.53\" (1.385 m).    Weight as of this encounter: 82 lb 3.2 oz (37.3 kg).  Medication Reconciliation: complete   Huyen Parada MA        "

## 2017-11-22 NOTE — MR AVS SNAPSHOT
After Visit Summary   11/22/2017    Gricelda Navarro    MRN: [id number]           Patient Information     Date Of Birth          1960        Visit Information        Provider Department      11/22/2017 11:15 AM Marycarmen Taylor MD; [employer] [facility]        Today's Diagnoses     Midline cystocele    -  1    Rectal mass        Internal hemorrhoids          Care Instructions    At [facility], we strive to deliver an exceptional experience to you, every time we see you.  If you receive a survey in the mail, please send us back your thoughts. We really do value your feedback.    Based on your medical history, these are the current health maintenance/preventive care services that you are due for (some may have been done at this visit.)  Health Maintenance Due   Topic Date Due     MAMMO SCREEN Q2 YR (SYSTEM ASSIGNED)  08/19/2016         Suggested websites for health information:  Www.[employer]Ã¢â€žÂ¢ Interactive : Up to date and easily searchable information on multiple topics.  Www.medlineplus.gov : medication info, interactive tutorials, watch real surgeries online  Www.familydoctor.org : good info from the Academy of Family Physicians  Www.cdc.gov : public health info, travel advisories, epidemics (H1N1)  Www.aap.org : children's health info, normal development, vaccinations  Www.health.state[place].us : [place] dept of health, public health issues in [place], N1N1    Your care team:                            Family Medicine Internal Medicine   MD Skinny Evans MD Shantel Branch-Fleming, MD Katya Georgiev PA-C Nam Ho, MD Pediatrics   MARSHA Combs, LORNA Kerr APRN MD Simran Bojorquez MD Deborah Mielke, MD Kim Thein, APRN CNP      Clinic hours: Monday - Thursday 7 am-7 pm; Fridays 7 am-5 pm.   Urgent care: Monday - Friday 11 am-9 pm; Saturday and Sunday 9 am-5 pm.  Pharmacy :  Monday -Thursday 8 am-8 pm; Friday 8 am-6 pm; Saturday and Sunday 9 am-5 pm.     Clinic: [phone]   Pharmacy: [phone]            Follow-ups after your visit        Additional Services     OB/GYN REFERRAL       Your provider has referred you to:  FM[facility]: [facility] [phone]   [email or web address]/  FMG: [facility] [place] [facility] [place] [phone]   [email or web address]     Please be aware that coverage of these services is subject to the terms and limitations of your health insurance plan.  Call member services at your health plan with any benefit or coverage questions.      Please bring the following with you to your appointment:    (1) Any X-Rays, CTs or MRIs which have been performed.  Contact the facility where they were done to arrange for  prior to your scheduled appointment.   (2) List of current medications   (3) This referral request   (4) Any documents/labs given to you for this referral                  Who to contact     If you have questions or need follow up information about today's clinic visit or your schedule please contact [facility] directly at [phone].  Normal or non-critical lab and imaging results will be communicated to you by MyChart, letter or phone within 4 business days after the clinic has received the results. If you do not hear from us within 7 days, please contact the clinic through MyChart or phone. If you have a critical or abnormal lab result, we will notify you by phone as soon as possible.  Submit refill requests through [employer] or call your pharmacy and they will forward the refill request to us. Please allow 3 business days for your refill to be completed.          Additional Information About Your Visit        [employer] Information     [employer] lets you send messages to your doctor, view your test results, renew your  "prescriptions, schedule appointments and more. To sign up, go to [email or web address] . Click on \"Log in\" on the left side of the screen, which will take you to the Welcome page. Then click on \"Sign up Now\" on the right side of the page.     You will be asked to enter the access code listed below, as well as some personal information. Please follow the directions to create your username and password.     Your access code is: [email or web address]  Expires: 2018 10:27 AM     Your access code will  in 90 days. If you need help or a new code, please call your [facility] clinic or [phone].        Care EveryWhere ID     This is your Care EveryWhere ID. This could be used by other organizations to access your [facility] medical records  [id number]        Your Vitals Were     Pulse Temperature Height Last Period Pulse Oximetry Breastfeeding?    61 97.2  F (36.2  C) (Oral) 4' 6.53\" (1.385 m) 2013 100% No    BMI (Body Mass Index)                   19.44 kg/m2            Blood Pressure from Last 3 Encounters:   17 110/64   17 104/74   17 120/66    Weight from Last 3 Encounters:   17 82 lb 3.2 oz (37.3 kg)   17 81 lb (36.7 kg)   17 86 lb 12.8 oz (39.4 kg)              We Performed the Following     OB/GYN REFERRAL          Today's Medication Changes          These changes are accurate as of: 17 11:39 AM.  If you have any questions, ask your nurse or doctor.               Start taking these medicines.        Dose/Directions    hydrocortisone 25 MG Suppository   Commonly known as:  ANUSOL-HC   Used for:  Internal hemorrhoids   Started by:  Marycarmen Taylor MD        Dose:  25 mg   Place 1 suppository (25 mg) rectally 2 times daily   Quantity:  28 suppository   Refills:  1            Where to get your medicines      These medications were sent to [facility], [place] - [address]  [address], [facility] [address]     Phone:  " [phone]     hydrocortisone 25 MG Suppository                Primary Care Provider Office Phone # Fax #    Marycarmen Cici Cuba [phone] [address]  [facility] [address]        Equal Access to Services     [facility] BRIDGET : Hadii aad ku hadasho Soomaali, waaxda luqadaha, qaybta kaalmada adeegyada, waxay yairin haydenisse barrigatrinityjenni perez. So [facility] [phone].    ATENCIÓN: Si habla español, tiene a hernandez disposición servicios gratuitos de asistencia lingüística. Ro al [phone].    We comply with applicable federal civil rights laws and [place] laws. We do not discriminate on the basis of race, color, national origin, age, disability, sex, sexual orientation, or gender identity.            Thank you!     Thank you for choosing [facility]  for your care. Our goal is always to provide you with excellent care. Hearing back from our patients is one way we can continue to improve our services. Please take a few minutes to complete the written survey that you may receive in the mail after your visit with us. Thank you!             Your Updated Medication List - Protect others around you: Learn how to safely use, store and throw away your medicines at www.disposemymeds.org.          This list is accurate as of: 11/22/17 11:39 AM.  Always use your most recent med list.                   Brand Name Dispense Instructions for use Diagnosis    buPROPion 150 MG 24 hr tablet    WELLBUTRIN XL    90 tablet    Take 1 tablet (150 mg) by mouth every morning    Moderate episode of recurrent major depressive disorder (H)       cyclobenzaprine 5 MG tablet    FLEXERIL    20 tablet    Take 1 tablet (5 mg) by mouth nightly as needed    Myalgia and myositis       diclofenac 1 % Gel topical gel    VOLTAREN    100 g    Apply 2 grams to affected areas up to four times daily using enclosed dosing card.    Back muscle spasm       ferrous gluconate 324 (38 FE) MG tablet     FERGON    90 tablet    TAKE 1 TABLET BY MOUTH TWICE DAILY // IB ZAUG NOJ IB LUB, IB HNUB NOJ OB ZAUG PAB JAHAIRA NTSHAV LIAB    Other iron deficiency anemia       hydrocortisone 25 MG Suppository    ANUSOL-HC    28 suppository    Place 1 suppository (25 mg) rectally 2 times daily    Internal hemorrhoids       loratadine 10 MG ODT tab    CLARITIN REDITABS     Take 10 mg by mouth daily        ranitidine 300 MG tablet    ZANTAC    90 tablet    Take 1 tablet (300 mg) by mouth At Bedtime    Chronic gastritis, presence of bleeding unspecified, unspecified gastritis type       traMADol 50 MG tablet    ULTRAM    30 tablet    Take 1 tablet (50 mg) by mouth every 6 hours as needed for pain    Cervical radiculopathy       * vitamin D 2000 UNITS tablet     100 tablet    TAKE 1 TABLET BY MOUTH ONCE DAILY // IB HNUB NOJ IB LUB    Vitamin D deficiency       * cholecalciferol 13294 UNITS capsule    VITAMIN D3    8 capsule    Take 1 capsule (50,000 Units) by mouth once a week    Vitamin D deficiency       * Notice:  This list has 2 medication(s) that are the same as other medications prescribed for you. Read the directions carefully, and ask your doctor or other care provider to review them with you.

## 2017-11-22 NOTE — PROGRESS NOTES
"  SUBJECTIVE:   Gricelda Navarro is a 57 year old female who presents to clinic today for the following health issues:      Colonoscopy Follow up    Pt declined mammogram, because due to small tissues in her breast.    Had colonoscopy and mass is not colonic in origin.  Patient denies bladder leakage.    Problem list and histories reviewed & adjusted, as indicated.  Additional history: as documented    Patient Active Problem List   Diagnosis     Recurrent cold sores     Gastritis     Back pain     Abdominal pain, acute, periumbilical     Major depression     Generalized anxiety disorder     Seasonal allergic rhinitis     Iron deficiency anemia     Excessive or frequent menstruation     Guaiac positive stools     CARDIOVASCULAR SCREENING; LDL GOAL LESS THAN 160     Vitamin D deficiency     External hemorrhoids     Past Surgical History:   Procedure Laterality Date     C/SECTION, LOW TRANSVERSE      , Low Transverse     COLONOSCOPY WITH CO2 INSUFFLATION N/A 2017    Procedure: COLONOSCOPY WITH CO2 INSUFFLATION;  colonoscopy/Dr. Destiney Cuba/Rectal mass [K62.9]  - Primary [facility]i: [facility]: [phone];  Surgeon: John Aguilar MD;  Location: [facility] OR     TUBAL LIGATION         Social History   Substance Use Topics     Smoking status: Never Smoker     Smokeless tobacco: Never Used     Alcohol use No     Family History   Problem Relation Age of Onset     Unknown/Adopted Mother      Unknown/Adopted Father              Reviewed and updated as needed this visit by clinical staffTobacco  Allergies  Meds  Problems       Reviewed and updated as needed this visit by Provider  Allergies  Meds  Problems         ROS:  Constitutional, HEENT, cardiovascular, pulmonary, gi and gu systems are negative, except as otherwise noted.      OBJECTIVE:   /64 (BP Location: Left arm, Patient Position: Chair, Cuff Size: Adult Regular)  Pulse 61  Temp 97.2  F (36.2  C) (Oral)  Ht 4' 6.53\" (1.385 m)  Wt 82 lb 3.2 oz " (37.3 kg)  [facility] 12/25/2013  SpO2 100%  Breastfeeding? No  BMI 19.44 kg/m2  Body mass index is 19.44 kg/(m^2).  GENERAL: healthy, alert and no distress  NECK: no adenopathy, no asymmetry, masses, or scars and thyroid normal to palpation  RESP: lungs clear to auscultation - no rales, rhonchi or wheezes  CV: regular rate and rhythm, normal S1 S2, no S3 or S4, no murmur, click or rub, no peripheral edema and peripheral pulses strong  ABDOMEN: soft, nontender, no hepatosplenomegaly, no masses and bowel sounds normal  MS: no gross musculoskeletal defects noted, no edema    Diagnostic Test Results:  Results for orders placed or performed during the hospital encounter of 11/20/17   COLONOSCOPY   Result Value Ref Range    COLONOSCOPY       [facility]  Endoscopy Department-[place]  _______________________________________________________________________________  Patient Name: Gricelda Navarro                Procedure Date: 11/20/2017 7:44 AM  MRN: [id number]                       YOB: 1960  Admit Type: Outpatient                Age: 57  Gender: Female                        Note Status: Finalized  Attending MD: John Aguilar MD        _______________________________________________________________________________     Procedure:                Colonoscopy  Indications:              Screening for colorectal malignant neoplasm, Mass                             on digital rectal examination.  Providers:                John Aguilar MD, Corie Ledesma RN  Patient Profile:          This is a 57 year old female.  Referring MD:             Marycarmen Cuba MD  Medicines:                Midazolam 1.5 mg IV, Fentanyl 75 micrograms IV  Complications:            No immediate complicatio ns.  _______________________________________________________________________________  Procedure:                Pre-Anesthesia Assessment:                            - Prior to the procedure, a History  and Physical                             was performed, and patient medications, allergies                             and sensitivities were reviewed. The patient's                             tolerance of previous anesthesia was reviewed.                            - The risks and benefits of the procedure and the                             sedation options and risks were discussed with the                             patient. All questions were answered and informed                             consent was obtained.                            - Patient identification and proposed procedure                             were verified prior to the procedure by the                             physician and the nurse. The procedure was verified                             in the pre-procedu re area in the procedure room.                            - Pre-procedure physical examination revealed no                             contraindications to sedation.                            After obtaining informed consent, the colonoscope                             was passed under direct vision. Throughout the                             procedure, the patient's blood pressure, pulse, and                             oxygen saturations were monitored continuously. The                             Colonoscope was introduced through the anus and                             advanced to the terminal ileum. The Colonoscope was                             introduced through the and advanced to. The                             colonoscopy was performed without difficulty. The                             patient tolerated the procedure well. The quality                             of the bowel preparation was excellent.                                                                                    Findings:       The digital rectal exam findings include palpable rectal mass. This        feels like extrinsic compression from the low  pelvis, pressing in on the        lower rectum. It is about 2 by 1.6 cm and located to the left of        midline, anterior to the rectum. Pertinent negatives include normal        sphincter tone.       The terminal ileum appeared normal.       Internal hemorrhoids were found during retroflexion. The hemorrhoids        were Grade II (internal hemorrhoids that prolapse but reduce        spontaneously).       The exam was otherwise without abnormality.                                                                                   Moderate Sedation:       Moderate (conscious) sedation was administered by the endoscopy nurse        and supervised by the endoscopist. The following parameters were        monitored: oxygen saturation, heart rate, blood pressure, and response        to care. Total physician intraservice time was 23 minutes.  Im pression:               - Palpable rectal mass found on digital rectal exam.                            - The examined portion of the ileum was normal.                            - Internal hemorrhoids.                            - The examination was otherwise normal.                            - No specimens collected.  Recommendation:           - Discharge patient to home (ambulatory).                            - Return to referring physician PRN.                                                                                     __________________  John Aguilar MD  11/20/2017 8:58:12 AM  I was physically present for the entire viewing portion of the exam.John Aguilar MD  Number of Addenda: 0    Note Initiated On: 11/20/2017 7:44 AM  Scope Withdrawal Time: 0 hours 8 minutes 29 seconds   Total Procedure Duration: 0 hours 23 minutes 10 seconds   Scope In: 8:28:00 AM  Scope Out: 8:51:10 AM         ASSESSMENT/PLAN:     1. Midline cystocele  Most likely - referral to OB for further evaluation and treatment.  - OB/GYN REFERRAL    2. Rectal mass  As above  - OB/GYN  REFERRAL    3. Internal hemorrhoids  Topical treatment and discussed that may not be covered.  - hydrocortisone (ANUSOL-HC) 25 MG Suppository; Place 1 suppository (25 mg) rectally 2 times daily  Dispense: 28 suppository; Refill: 1    The uses and side effects, including black box warnings as appropriate, were discussed in detail.  All patient questions were answered.  The patient was instructed to call immediately if any side effects developed.       Marycarmen Cuba MD  [facility]

## 2017-11-22 NOTE — PATIENT INSTRUCTIONS
At [facility], we strive to deliver an exceptional experience to you, every time we see you.  If you receive a survey in the mail, please send us back your thoughts. We really do value your feedback.    Based on your medical history, these are the current health maintenance/preventive care services that you are due for (some may have been done at this visit.)  Health Maintenance Due   Topic Date Due     MAMMO SCREEN Q2 YR (SYSTEM ASSIGNED)  08/19/2016         Suggested websites for health information:  Www.[employer] : Up to date and easily searchable information on multiple topics.  Www.[employer]ic[employer].gov : medication info, interactive tutorials, watch real surgeries online  Www.familydoctor.org : good info from the Academy of Family Physicians  Www.cdc.gov : public health info, travel advisories, epidemics (H1N1)  Www.aap.org : children's health info, normal development, vaccinations  Www.health.[facility].[place].us : [place] dept of health, public health issues in [place], N1N1    Your care team:                            Family Medicine Internal Medicine   MD Skinny Evans MD Shantel Branch-Fleming, MD Katya Georgiev PA-C Nam Ho, MD Pediatrics   Itz Morales, PALOR Bradshaw, LORNA JENKINS CNP   MD Simran Bowie MD Deborah Mielke, MD Kim Thein, APRN CNP      Clinic hours: Monday - Thursday 7 am-7 pm; Fridays 7 am-5 pm.   Urgent care: Monday - Friday 11 am-9 pm; Saturday and Sunday 9 am-5 pm.  Pharmacy : Monday -Thursday 8 am-8 pm; Friday 8 am-6 pm; Saturday and Sunday 9 am-5 pm.     Clinic: [phone]   Pharmacy: [phone]

## 2017-12-05 ENCOUNTER — OFFICE VISIT (OUTPATIENT)
Dept: OBGYN | Facility: CLINIC | Age: 57
End: 2017-12-05
Payer: COMMERCIAL

## 2017-12-05 VITALS
HEART RATE: 72 BPM | SYSTOLIC BLOOD PRESSURE: 116 MMHG | TEMPERATURE: 97.4 F | WEIGHT: 84 LBS | DIASTOLIC BLOOD PRESSURE: 64 MMHG | BODY MASS INDEX: 19.86 KG/M2

## 2017-12-05 DIAGNOSIS — R19.00 PELVIC MASS: Primary | ICD-10-CM

## 2017-12-05 PROCEDURE — 99203 OFFICE O/P NEW LOW 30 MIN: CPT | Performed by: OBSTETRICS & GYNECOLOGY

## 2017-12-05 NOTE — PATIENT INSTRUCTIONS
If you have any questions regarding your visit, Please contact your care team.     [facility]: [phone]    [facility] CLINIC HOURS TELEPHONE NUMBER   CARMITA Muller-    Shavonne Peña-ABA Gonzalez-Medical Assistant   Monday-[place]  8:00a.m-4:45 p.m  Wednesday-[place] 8:00a.m-4:45 p.m.  Thursday-[place]  8:00a.m-4:45 p.m.  Friday-[place]  8:00a.m-4:45 p.m. [facility]  [address] [place] [address]  [phone] ask [facility]  [phone] Fax  Imaging [phone]    [facility] Labor and Delivery  [address] [place] [address] [place] [address]  [phone]    [facility]  [address] [place] [address]  [phone] [facility]  [phone] Fax  Imaging [phone]     Urgent Care locations:    [place]        [place] Monday-Friday  5 pm - 9 pm  Saturday and Sunday   9 am - 5 pm    Monday-Friday   11 am - 9 pm  Saturday and Sunday   9 am - 5 pm   [phone]       If you need a medication refill, please contact your pharmacy. Please allow 3 business days for your refill to be completed.  As always, Thank you for trusting us with your healthcare needs!

## 2017-12-05 NOTE — MR AVS SNAPSHOT
After Visit Summary   12/5/2017    Gricelda Navarro    MRN: [id number]           Patient Information     Date Of Birth          1960        Visit Information        Provider Department      12/5/2017 10:15 AM Rashaad Butler MD; [employer] [facility]        Care Instructions                                                        If you have any questions regarding your visit, Please contact your care team.     [facility]: [phone]    [facility] CLINIC HOURS TELEPHONE NUMBER   Rashaad Butler M.D.      Perlita-    Shavonne Peña-ABA Gonzalez-Medical Assistant   Monday-[place]  8:00a.m-4:45 p.m  Wednesday-[place] 8:00a.m-4:45 p.m.  [facility]  8:00[facility].m-4:45 p.m.  Friday-[place]  8:00a.m-4:45 p.m. [facility]  [address] [facility] [address]  [place], [place] [address] [phone] ask [facility]  [phone] Fax  Imaging Uzwzzfsoso-[phone]    [facility] Labor and Delivery  [address] [place] [address] Dr.  [place], [place] [address] [phone]    [facility]  [address] [facility] [place] [address] [phone] ask [facility]  [phone] Fax  Imaging Oooholrero-[phone]     Urgent Care locations:    [facility] Monday-Friday  5 pm - 9 pm  Saturday and Sunday   9 am - 5 pm    Monday-Friday   11 am - 9 pm  Saturday and Sunday   9 am - 5 pm   [phone]       If you need a medication refill, please contact your pharmacy. Please allow 3 business days for your refill to be completed.  As always, Thank you for trusting us with your healthcare needs!            Follow-ups after your visit        Who to contact     If you have questions or need follow up information about today's clinic visit or your schedule please contact [facility] directly at [phone].  Normal or non-critical lab and imaging results will be  "communicated to you by MyChart, letter or phone within 4 business days after the clinic has received the results. If you do not hear from us within 7 days, please contact the clinic through [employer] or phone. If you have a critical or abnormal lab result, we will notify you by phone as soon as possible.  Submit refill requests through [employer] or call your pharmacy and they will forward the refill request to us. Please allow 3 business days for your refill to be completed.          Additional Information About Your Visit        [facility] Information     [facility] lets you send messages to your doctor, view your test results, renew your prescriptions, schedule appointments and more. To sign up, go to [email or web address] . Click on \"Log in\" on the left side of the screen, which will take you to the Welcome page. Then click on \"Sign up Now\" on the right side of the page.     You will be asked to enter the access code listed below, as well as some personal information. Please follow the directions to create your username and password.     Your access code is: [email or web address]  Expires: 2018 10:27 AM     Your access code will  in 90 days. If you need help or a new code, please call your [place] clinic or [phone].        Care EveryWhere ID     This is your Care EveryWhere ID. This could be used by other organizations to access your [place] medical records  [id number]        Your Vitals Were     Pulse Temperature Last Period Breastfeeding? BMI (Body Mass Index)       72 97.4  F (36.3  C) (Oral) 2013 No 19.86 kg/m2        Blood Pressure from Last 3 Encounters:   17 116/64   17 110/64   17 104/74    Weight from Last 3 Encounters:   17 84 lb (38.1 kg)   17 82 lb 3.2 oz (37.3 kg)   17 81 lb (36.7 kg)              Today, you had the following     No orders found for display       Primary Care Provider Office Phone # Fax #    Marycarmenmaisha Cuba [phone] " [phone]       [address] [facility] [address]        Equal Access to Services     JAYLENE GILL : Hadii aad ku hadgabeo Soclayali, waaxda luqadaha, qaybta kaalmada adeegyada, julien elham joelmichelle bonddenita mcelroy mookie perez. So [facility] [phone].    ATENCIÓN: Si habla español, tiene a hernandez disposición servicios gratuitos de asistencia lingüística. Johname al [phone].    We comply with applicable federal civil rights laws and [place] laws. We do not discriminate on the basis of race, color, national origin, age, disability, sex, sexual orientation, or gender identity.            Thank you!     Thank you for choosing [facility]  for your care. Our goal is always to provide you with excellent care. Hearing back from our patients is one way we can continue to improve our services. Please take a few minutes to complete the written survey that you may receive in the mail after your visit with us. Thank you!             Your Updated Medication List - Protect others around you: Learn how to safely use, store and throw away your medicines at www.disposemymeds.org.          This list is accurate as of: 12/5/17 10:26 AM.  Always use your most recent med list.                   Brand Name Dispense Instructions for use Diagnosis    buPROPion 150 MG 24 hr tablet    WELLBUTRIN XL    90 tablet    Take 1 tablet (150 mg) by mouth every morning    Moderate episode of recurrent major depressive disorder (H)       cyclobenzaprine 5 MG tablet    FLEXERIL    20 tablet    Take 1 tablet (5 mg) by mouth nightly as needed    Myalgia and myositis       diclofenac 1 % Gel topical gel    VOLTAREN    100 g    Apply 2 grams to affected areas up to four times daily using enclosed dosing card.    Back muscle spasm       ferrous gluconate 324 (38 FE) MG tablet    FERGON    90 tablet    TAKE 1 TABLET BY MOUTH TWICE DAILY // IB ZAUG NOJ IB LUB, IB HNUB NOJ OB ZAUG PAB JAHAIRA NTSHAV LIAB    Other iron deficiency anemia        hydrocortisone 25 MG Suppository    ANUSOL-HC    28 suppository    Place 1 suppository (25 mg) rectally 2 times daily    Internal hemorrhoids       loratadine 10 MG ODT tab    CLARITIN REDITABS     Take 10 mg by mouth daily        ranitidine 300 MG tablet    ZANTAC    90 tablet    Take 1 tablet (300 mg) by mouth At Bedtime    Chronic gastritis, presence of bleeding unspecified, unspecified gastritis type       traMADol 50 MG tablet    ULTRAM    30 tablet    Take 1 tablet (50 mg) by mouth every 6 hours as needed for pain    Cervical radiculopathy       * vitamin D 2000 UNITS tablet     100 tablet    TAKE 1 TABLET BY MOUTH ONCE DAILY // IB HNUB NOJ IB LUB    Vitamin D deficiency       * cholecalciferol 83924 UNITS capsule    VITAMIN D3    8 capsule    Take 1 capsule (50,000 Units) by mouth once a week    Vitamin D deficiency       * Notice:  This list has 2 medication(s) that are the same as other medications prescribed for you. Read the directions carefully, and ask your doctor or other care provider to review them with you.

## 2017-12-05 NOTE — NURSING NOTE
"Chief Complaint   Patient presents with     Consult     Ref to discuss mass and cystocele       Initial /64 (BP Location: Left arm, Patient Position: Chair, Cuff Size: Adult Small)  Pulse 72  Temp 97.4  F (36.3  C) (Oral)  Wt 84 lb (38.1 kg)  LMP 12/25/2013  Breastfeeding? No  BMI 19.86 kg/m2 Estimated body mass index is 19.86 kg/(m^2) as calculated from the following:    Height as of 11/22/17: 4' 6.53\" (1.385 m).    Weight as of this encounter: 84 lb (38.1 kg).  Medication Reconciliation: complete   Lisa Mayen CMA      "

## 2017-12-06 NOTE — PROGRESS NOTES
OB-GYN Problem-Oriented Visit or Consultation      Gricelda Navarro is a 57 year old year old P 7 who presents with a chief complaint of pelvic mass.  Referred by Dr. Destiney Cuba.  Patient's last menstrual period was 12/25/2013.    HPI:     Past C/S for ?prolapse. Past TL. Menopause in 2015 or 2016. Recently was reducing a hemorrhoid and felt a numb vibrating small egg size firm mass and worried. Pelvic exam by Dr. Destiney Cuba was neg except for some cystocele. Had neg colonoscopy recently except for hemorrhoids and Dr. Aguilar noted a palpable mass extrinsic and anterior to the rectum. Pap/HRHPV in 2016 was neg. Weight stable and reports normal appetite and BMs. Here with .     Past medical, obstetrical, surgical, family and social history reviewed and as noted or updated in chart.     Allergies, meds and supplements are as noted or updated in chart.      ROS:   Systems reviewed include:  constitutional, gastrointestinal, genitourinary, musculoskeletal, integumentary, psychological, hematologic/lymphatic and endocrine.    These systems were negative for significant symptoms except for the following additional: none; see HPI.    EXAM:  VS as noted. /64 (BP Location: Left arm, Patient Position: Chair, Cuff Size: Adult Small)  Pulse 72  Temp 97.4  F (36.3  C) (Oral)  Wt 84 lb (38.1 kg)  LMP 12/25/2013  Breastfeeding? No  BMI 19.86 kg/m2  Constitutionally normal.     Abd and Pelvis were  normal or negative except for, or in particular noting, the following  pertinent findings: mild pelvic relaxation and atrophic changes. Cervix without lesions.  No stool in rectum. I do not palpate a separate pelvic mass on RV exam or any nodularity but question if patient may be feeling her cervix through the rectal wall. Patient is not convinced of this.       Assessment:   Encounter Diagnoses   Name Primary?     Pelvic mass Yes     Plan and Recommendations: I reviewed the condition, causes, differential  diagnosis, prognosis, evaluation and management considerations and options.  Questions answered and information given. See orders. Second opinion or subspecialty evaluation and management also offered.    Suggest that patient check on vaginal and then rectal exam at home as I have done here and see if cervix is the same size as this mass. If it is persistent, then she should return for a recheck with me.   Total encounter time (physician together with patient) = 30min. Direct counseling, education and care coordination time (physician together with patient) = 15min.     A/P:  Gricelda was seen today for consult.    Diagnoses and all orders for this visit:    Pelvic mass        Rashaad Butler MD

## 2017-12-12 LAB — COLONOSCOPY: NORMAL

## 2017-12-26 ENCOUNTER — TELEPHONE (OUTPATIENT)
Dept: FAMILY MEDICINE | Facility: CLINIC | Age: 57
End: 2017-12-26

## 2017-12-26 DIAGNOSIS — K64.8 INTERNAL HEMORRHOIDS: ICD-10-CM

## 2017-12-26 DIAGNOSIS — K64.4 EXTERNAL HEMORRHOIDS: Primary | ICD-10-CM

## 2017-12-26 NOTE — TELEPHONE ENCOUNTER
Insurance is asking for a Prior Authorization for medication.     Information entered with Cover my meds.   Will wait for a reply.       Nery Paulson CMA

## 2017-12-29 NOTE — TELEPHONE ENCOUNTER
Received a message that further information is needed for coverage review.     Questions completed in cover my meds.  Key [id number].     Will wait for reply.     Nery Paulson CMA

## 2018-01-03 ENCOUNTER — TELEPHONE (OUTPATIENT)
Dept: FAMILY MEDICINE | Facility: CLINIC | Age: 58
End: 2018-01-03

## 2018-01-03 DIAGNOSIS — E55.9 VITAMIN D DEFICIENCY: ICD-10-CM

## 2018-01-03 NOTE — TELEPHONE ENCOUNTER
Forms faxed, confirmed and placed in green folder awaiting approval/denial.  Aracely TOMLINSON

## 2018-01-03 NOTE — TELEPHONE ENCOUNTER
Requested Prescriptions   Pending Prescriptions Disp Refills     vitamin D (ERGOCALCIFEROL) 62491 UNIT capsule [Pharmacy Med Name: VIT D2 1.25 MG (50,000 UNIT 04207 CAP]  Last Written Prescription Date:  11/09/17  Last Fill Quantity: 8,  # refills: 0   Last Office Visit with G, P or [facility] prescribing provider:  12/05/17   Future Office Visit:    8 capsule 0     Sig: TAKE 1 CAPSULE BY MOUTH ONCE A WEEK // IB AS MATY KLINE 1 LUB    There is no refill protocol information for this order

## 2018-01-05 RX ORDER — ERGOCALCIFEROL 1.25 MG/1
CAPSULE, LIQUID FILLED ORAL
Qty: 8 CAPSULE | Refills: 0 | OUTPATIENT
Start: 2018-01-05

## 2018-01-05 NOTE — TELEPHONE ENCOUNTER
Prior authorization denied needs to try and fail Proctozone-Hc or Proctosol-Hc please advise.  Forms on providers desk to review.  Aracely TOMLINSON

## 2018-01-09 NOTE — TELEPHONE ENCOUNTER
Called and explained message of refill denial and that a lab only appointment is needed.  Person answering stated that they will call back and schedule the lab only appointment.  Harini Askew MA/  For Teams Shaheen

## 2018-02-02 DIAGNOSIS — F33.1 MODERATE EPISODE OF RECURRENT MAJOR DEPRESSIVE DISORDER (H): ICD-10-CM

## 2018-02-07 RX ORDER — BUPROPION HYDROCHLORIDE 150 MG/1
TABLET ORAL
Qty: 30 TABLET | Refills: 0 | Status: SHIPPED | OUTPATIENT
Start: 2018-02-07 | End: 2019-01-09

## 2018-04-27 DIAGNOSIS — K29.50 CHRONIC GASTRITIS, PRESENCE OF BLEEDING UNSPECIFIED, UNSPECIFIED GASTRITIS TYPE: ICD-10-CM

## 2018-04-27 NOTE — TELEPHONE ENCOUNTER
"Requested Prescriptions   Pending Prescriptions Disp Refills     ranitidine (ZANTAC) 300 MG tablet [Pharmacy Med Name: RANITIDINE 300 MG TABLET 300 TAB]  Last Written Prescription Date:  04/19/17  Last Fill Quantity: 90,  # refills: 3  Last Office Visit with [facility], [facility] or [facility] prescribing provider:  12/05/17   Future Office Visit:    90 tablet 3     Sig: TAKE 1 TABLET BY MOUTH AT BEDTIME DAILY FOR STOMACH//IB HNUB NOJ 1 LUB THAUM MUS PW PAB JAHAIRA MOB PLAB (NCAUJ PLAB)    H2 Blockers Protocol Passed    4/27/2018 11:16 AM       Passed - Patient is age 12 or older       Passed - Recent (12 mo) or future (30 days) visit within the authorizing provider's specialty    Patient had office visit in the last 12 months or has a visit in the next 30 days with authorizing provider or within the authorizing provider's specialty.  See \"Patient Info\" tab in inbasket, or \"Choose Columns\" in Meds & Orders section of the refill encounter.              "

## 2018-05-01 NOTE — TELEPHONE ENCOUNTER
Prescription approved per [facility] Refill Protocol.    See [place] RN, BSN

## 2018-05-17 ENCOUNTER — TELEPHONE (OUTPATIENT)
Dept: FAMILY MEDICINE | Facility: CLINIC | Age: 58
End: 2018-05-17

## 2018-05-17 NOTE — TELEPHONE ENCOUNTER
Panel Management Review      BP Readings from Last 1 Encounters:   12/05/17 116/64    , No results found for: A1C, 1/3/2018  Last Office Visit with this department: 1/3/2018    Fail List measure:     Depression / Dysthymia review    Measure:  Needs PHQ-9 score of 4 or less during index window.  Administer PHQ-9 and if score is 5 or more, send encounter to provider for next steps.    5   7 month window range: 4/8/18-6/8/18    PHQ-9 SCORE 5/26/2016 10/27/2016 11/8/2017   Total Score - - -   Total Score 17 15 19       If PHQ-9 recheck is 5 or more, route to provider for next steps.    Patient is due for:  PHQ9      Patient is due/failing the following:   PHQ9    Action needed:   Routed to RN to advise    Type of outreach:    2nd floor MA routing to RN to advise    Questions for provider review:    None                                                                                                                                    Kimmy Navarro      Chart routed to Care Team .

## 2018-05-17 NOTE — TELEPHONE ENCOUNTER
Gricelda Navarro is a 57 year old female who calls with Suicidal ideation/plan intention.    NURSING ASSESSMENT:  Description:  PHQ-9 positive  Onset/duration:  When she is having a lot of pain  Precip. factors:  pain    Allergies:   Allergies   Allergen Reactions     Prednisone Hives     Cortisone Itching, Swelling and Rash     Cyclobenzaprine Rash       Complete PHQ-9 and KELSIE 7 -if appropriate with the presenting symptoms: Yes    Last visit:  11/22/17  Imminent danger to self:  no.   Imminent danger to others:  no.   Impaired reality:  no  Is patient home alone?  Did not ask  Is there someone that can be contacted to be with patient if needed? N/A      If further questions/concerns or if symptoms do not improve, worsen or new symptoms develop such as  *Suicidal thought without a plan or means to carry out the plan  *Depression interferes with daily activities  *Persistent inability to sleep  Call your PCP or [facility] as soon as possible.     SEEK [facility] CARE IMMEDIATELY IF ANY OF THE FOLLOWING OCCUR:  *Suicidal thoughts and a plan and means to carry out the plan  *Injury to self or others     Information given regarding interventions: Patient to contact friends or family for support and patient declines hotlines stating she would contact the clinic    NURSING PLAN: Nursing advice to patient schedule with [facility] for pain management.    RECOMMENDED DISPOSITION:  See within 2 weeks -  Will comply with recommendation: Yes    Patient notes that she has not plan to hurt herself. She does think about how if she was dead she would not feel the back of abdominal pain. Notes that she is actually scared to die.  Will have family call to schedule appointment with Dr Robert for pain management.      Guideline used:  Telephone Triage Protocols for Nurses, Fifth Edition, Faith Knox RN

## 2018-05-18 ASSESSMENT — PATIENT HEALTH QUESTIONNAIRE - PHQ9: SUM OF ALL RESPONSES TO PHQ QUESTIONS 1-9: 11

## 2018-05-23 ENCOUNTER — OFFICE VISIT (OUTPATIENT)
Dept: FAMILY MEDICINE | Facility: CLINIC | Age: 58
End: 2018-05-23
Payer: COMMERCIAL

## 2018-05-23 VITALS
BODY MASS INDEX: 20.04 KG/M2 | SYSTOLIC BLOOD PRESSURE: 115 MMHG | DIASTOLIC BLOOD PRESSURE: 68 MMHG | WEIGHT: 86.6 LBS | OXYGEN SATURATION: 98 % | RESPIRATION RATE: 18 BRPM | HEART RATE: 78 BPM | TEMPERATURE: 98.4 F | HEIGHT: 55 IN

## 2018-05-23 DIAGNOSIS — M54.50 ACUTE BILATERAL LOW BACK PAIN WITHOUT SCIATICA: ICD-10-CM

## 2018-05-23 DIAGNOSIS — M62.89 PELVIC FLOOR DYSFUNCTION: ICD-10-CM

## 2018-05-23 DIAGNOSIS — R10.31 RLQ ABDOMINAL PAIN: Primary | ICD-10-CM

## 2018-05-23 DIAGNOSIS — F33.1 MODERATE EPISODE OF RECURRENT MAJOR DEPRESSIVE DISORDER (H): ICD-10-CM

## 2018-05-23 PROCEDURE — 99214 OFFICE O/P EST MOD 30 MIN: CPT | Performed by: FAMILY MEDICINE

## 2018-05-23 ASSESSMENT — PAIN SCALES - GENERAL: PAINLEVEL: EXTREME PAIN (8)

## 2018-05-23 NOTE — MR AVS SNAPSHOT
After Visit Summary   5/23/2018    Gricelda Navarro    MRN: [id number]           Patient Information     Date Of Birth          1960        Visit Information        Provider Department      5/23/2018 11:25 AM Marycarmen Taylor MD; [employer] [facility]        Today's Diagnoses     RLQ abdominal pain    -  1    Pelvic floor dysfunction        Acute bilateral low back pain without sciatica        Moderate episode of recurrent major depressive disorder (H)          Care Instructions    Recommend pelvic floor dysfunction can be the cause of your pain.  Physical therapy or surgery might help for this.  Contact my office if you are interested in this.  At [facility], we strive to deliver an exceptional experience to you, every time we see you.  If you receive a survey in the mail, please send us back your thoughts. We really do value your feedback.    Based on your medical history, these are the current health maintenance/preventive care services that you are due for (some may have been done at this visit.)  Health Maintenance Due   Topic Date Due     HIV SCREEN (SYSTEM ASSIGNED)  07/12/1979     MAMMO SCREEN Q2 YR (SYSTEM ASSIGNED)  08/19/2016     DEPRESSION ACTION PLAN Q1 YR  01/25/2018   Call [phone] to schedule your mammogram.    Suggested websites for health information:  Www.Chideo.org : Up to date and easily searchable information on multiple topics.  Www.medlineplus.gov : medication info, interactive tutorials, watch real surgeries online  Www.familydoctor.org : good info from the Academy of Family Physicians  Www.cdc.gov : public health info, travel advisories, epidemics (H1N1)  Www.aap.org : children's health info, normal development, vaccinations  Www.health.state.[place].us : [place] dept of health, public health issues in [place], N1N1    Your care team:                            Family Medicine Internal Medicine   Adriano Vaz  "MD Marycarmen Calhoun MD Katya Georgiev PA-C Megan Hill, APRN [facility]    Clinton Lo, MD Pediatrics   Itz Morales, MARSHA Bradshaw, MD Cathy Nicholas APRN CNP   MD Simran Bowie MD Deborah Mielke, MD Dinorah Lundberg, APR[facility]      Clinic hours: Monday - Thursday 7 am-7 pm; Fridays 7 am-5 pm.   Urgent care: Monday - Friday 11 am-9 pm; Saturday and Sunday 9 am-5 pm.  Pharmacy : Monday -Thursday 8 am-8 pm; Friday 8 am-6 pm; Saturday and Sunday 9 am-5 pm.     Clinic: [phone]   Pharmacy: [phone]             Follow-ups after your visit        Who to contact     If you have questions or need follow up information about today's clinic visit or your schedule please contact [facility] directly at [phone].  Normal or non-critical lab and imaging results will be communicated to you by MyChart, letter or phone within 4 business days after the clinic has received the results. If you do not hear from us within 7 days, please contact the clinic through MyChart or phone. If you have a critical or abnormal lab result, we will notify you by phone as soon as possible.  Submit refill requests through MyChart or call your pharmacy and they will forward the refill request to us. Please allow 3 business days for your refill to be completed.          Additional Information About Your Visit        Care EveryWhere ID     This is your Care EveryWhere ID. This could be used by other organizations to access your [place] medical records  [id number]        Your Vitals Were     Pulse Temperature Respirations Height Last Period Pulse Oximetry    78 98.4  F (36.9  C) (Oral) 18 4' 6.33\" (1.38 m) 12/25/2013 98%    Breastfeeding? BMI (Body Mass Index)                No 20.63 kg/m2           Blood Pressure from Last 3 Encounters:   05/23/18 115/68   12/05/17 116/64   11/22/17 110/64    Weight from Last 3 Encounters:   05/23/18 86 lb 9.6 oz (39.3 kg) "   12/05/17 84 lb (38.1 kg)   11/22/17 82 lb 3.2 oz (37.3 kg)              We Performed the Following     DEPRESSION ACTION PLAN (DAP)        Primary Care Provider Office Phone # Fax #    Marycarmen Cici [place] [phone]       [address]  [facility] [address]        Equal Access to Services     [facility]: Hadii aad ku hadasho Soomaali, waaxda luqadaha, qaybta kaalmada adeegyada, waxay idiin hayaan adeeg kharash la'aan ah. So [facility] [phone].    ATENCIÓN: Si verito mcdermott, tiene a hernandez disposición servicios gratuitos de asistencia lingüística. Llame al [phone].    We comply with applicable federal civil rights laws and [place] laws. We do not discriminate on the basis of race, color, national origin, age, disability, sex, sexual orientation, or gender identity.            Thank you!     Thank you for choosing [facility]  for your care. Our goal is always to provide you with excellent care. Hearing back from our patients is one way we can continue to improve our services. Please take a few minutes to complete the written survey that you may receive in the mail after your visit with us. Thank you!             Your Updated Medication List - Protect others around you: Learn how to safely use, store and throw away your medicines at www.disposemymeds.org.          This list is accurate as of 5/23/18  1:11 PM.  Always use your most recent med list.                   Brand Name Dispense Instructions for use Diagnosis    buPROPion 150 MG 24 hr tablet    WELLBUTRIN XL    30 tablet    TAKE 1 TABLET BY MOUTH EVERY MORNING FOR DEPRESSION // IB HNUB NOJ 1 LUB THAUM SAWV NTXOV PAB JAHAIRA NYUAB SIAB    Moderate episode of recurrent major depressive disorder (H)       cyclobenzaprine 5 MG tablet    FLEXERIL    20 tablet    Take 1 tablet (5 mg) by mouth nightly as needed    Myalgia and myositis       diclofenac 1 % Gel topical gel    VOLTAREN    100 g    Apply 2 grams  to affected areas up to four times daily using enclosed dosing card.    Back muscle spasm       ferrous gluconate 324 (38 Fe) MG tablet    FERGON    90 tablet    TAKE 1 TABLET BY MOUTH TWICE DAILY // IB ZAUG NOJ IB LUB, IB HNUB NOJ OB ZAUG PAB JAHAIRA NTSHAV LIAB    Other iron deficiency anemia       hydrocortisone 2.5 % cream    ANUSOL-HC    30 g    Place rectally 2 times daily    External hemorrhoids, Internal hemorrhoids       loratadine 10 MG ODT tab    CLARITIN REDITABS     Take 10 mg by mouth daily        ranitidine 300 MG tablet    ZANTAC    90 tablet    TAKE 1 TABLET BY MOUTH AT BEDTIME DAILY FOR STOMACH//IB HNUB NOJ 1 LUB THAUM MUS PW PAB JAHAIRA MOB PLAB (NCAUJ PLAB)    Chronic gastritis, presence of bleeding unspecified, unspecified gastritis type       traMADol 50 MG tablet    ULTRAM    30 tablet    Take 1 tablet (50 mg) by mouth every 6 hours as needed for pain    Cervical radiculopathy       * vitamin D 2000 units tablet     100 tablet    TAKE 1 TABLET BY MOUTH ONCE DAILY // IB HNUB NOJ IB LUB    Vitamin D deficiency       * cholecalciferol 96038 units capsule    VITAMIN D3    8 capsule    Take 1 capsule (50,000 Units) by mouth once a week    Vitamin D deficiency       * Notice:  This list has 2 medication(s) that are the same as other medications prescribed for you. Read the directions carefully, and ask your doctor or other care provider to review them with you.

## 2018-05-23 NOTE — PROGRESS NOTES
SUBJECTIVE:   Gricelda Navarro is a 57 year old female who presents to clinic today for the following health issues:      Back Pain       Duration: 3 months on going        Specific cause: assuming from her pelvic, had seen in the past, buts assuming its in the cervix. Concerns there may be other problem related to this.   In the Past had shake then felt a ball like in the rectalusing her finger, thinking its cancer, had seen doctor for this OBGYN has suggest its her tube tie,OB doctor thinks its her uterus.    Description:   Location of pain: low back on the bone of spine,   Character of pain: Sharp, stabbing pain, like someone hit it, heaviness and feels like need to kneel only and constant  Pain radiation: Radiates to to the pelvic  New numbness or weakness in legs, not attributed to pain:  no     Intensity: Currently 9/10 for back pain/pelvic- wants to do a x ray, Headache 8/10    History:   Pain interferes with job: Not applicable  History of back problems: no prior back problems: 2004 had tube tie in [facility], thinking its from there  Any previous MRI or X-rays: Yes  Sees a specialist for back pain:None    Therapies tried without relief: none    Alleviating factors:   Improved by: none      Precipitating factors:  Worsened by: standing, pain gets worse  in the morning 7-8am, Noon gets better. Laying down hurts    Functional and Psychosocial Screen ([facility]):      Not performed today    RLQ abd pain - only had right tube tide as was hoping for a boy. Had a colonoscopy which was normal other than suspected GYN organ protruding into the rectum.    Low back pain - off and on for months. No new radicular symptoms.    Depression - not taking medication regularly just when she remembers.  Interested in PCA services so her family can take care of her.    Problem list and histories reviewed & adjusted, as indicated.  Additional history: as documented    Patient Active Problem List   Diagnosis     Recurrent cold sores  "    Gastritis     Back pain     Major depression     Generalized anxiety disorder     Seasonal allergic rhinitis     Iron deficiency anemia     CARDIOVASCULAR SCREENING; LDL GOAL LESS THAN 160     Vitamin D deficiency     External hemorrhoids     Internal hemorrhoids     Pelvic floor dysfunction     RLQ abdominal pain     Past Surgical History:   Procedure Laterality Date     C/SECTION, LOW TRANSVERSE      , Low Transverse     COLONOSCOPY WITH CO2 INSUFFLATION N/A 2017    Procedure: COLONOSCOPY WITH CO2 INSUFFLATION;  colonoscopy/Dr. Destiney Cuba/Rectal mass [K62.9]  - Primary [facility]: [facility]: [phone];  Surgeon: John Aguilar MD;  Location: [facility] OR     TUBAL LIGATION         Social History   Substance Use Topics     Smoking status: Never Smoker     Smokeless tobacco: Never Used     Alcohol use No     Family History   Problem Relation Age of Onset     Unknown/Adopted Mother      Unknown/Adopted Father            Reviewed and updated as needed this visit by clinical staff  Tobacco  Allergies  Meds  Problems  Med Hx  Surg Hx  Fam Hx  Soc Hx        Reviewed and updated as needed this visit by Provider  Allergies  Meds  Problems         ROS:  Constitutional, HEENT, cardiovascular, pulmonary, GI, , musculoskeletal, neuro, skin, endocrine and psych systems are negative, except as otherwise noted.    OBJECTIVE:     /68 (BP Location: Left arm, Patient Position: Chair, Cuff Size: Adult Regular)  Pulse 78  Temp 98.4  F (36.9  C) (Oral)  Resp 18  Ht 4' 6.33\" (1.38 m)  Wt 86 lb 9.6 oz (39.3 kg)  LMP 2013  SpO2 98%  Breastfeeding? No  BMI 20.63 kg/m2  Body mass index is 20.63 kg/(m^2).  GENERAL: healthy, alert and no distress  NECK: no adenopathy, no asymmetry, masses, or scars and thyroid normal to palpation  RESP: lungs clear to auscultation - no rales, rhonchi or wheezes  CV: regular rate and rhythm, normal S1 S2, no S3 or S4, no murmur, click or rub, no peripheral " edema and peripheral pulses strong  ABDOMEN: soft, nontender, no hepatosplenomegaly, no masses and bowel sounds normal  MS: no gross musculoskeletal defects noted, no edema  PSYCH: mentation appears normal and affect flat    Diagnostic Test Results:  none     ASSESSMENT/PLAN:     1. RLQ abdominal pain  Suspect related to constipation or tension from pelvic floor dysfunction.    2. Pelvic floor dysfunction  Offer PT or referral but refused for now.  Will discuss with family and then contact me.    3. Acute bilateral low back pain without sciatica  Monitor for now.    4. Moderate episode of recurrent major depressive disorder (H)  Recommended medication compliance.  - DEPRESSION ACTION PLAN (DAP)    The uses and side effects, including black box warnings as appropriate, were discussed in detail.  All patient questions were answered.  The patient was instructed to call immediately if any side effects developed.     FUTURE APPOINTMENTS:       - Follow-up visit in 1 month.    Marycarmen Cuba MD  [facility]

## 2018-05-25 DIAGNOSIS — D50.8 OTHER IRON DEFICIENCY ANEMIA: ICD-10-CM

## 2018-05-25 RX ORDER — FERROUS GLUCONATE 324(38)MG
TABLET ORAL
Qty: 90 TABLET | Refills: 1 | Status: SHIPPED | OUTPATIENT
Start: 2018-05-25 | End: 2019-05-29

## 2018-05-25 NOTE — TELEPHONE ENCOUNTER
Requested Prescriptions   Pending Prescriptions Disp Refills     ferrous gluconate (FERGON) 324 (38 Fe) MG tablet [Pharmacy Med Name: FERROUS GLUCONATE 324MG  (38 FE) TAB] 90 tablet 1     Sig: TAKE 1 TABLET BY MOUTH TWICE DAILY // IB ZAMARK NOAN IB LUB, IB HNUB NOJ OB JUVE PAB JAHAIRA NTSHAV LIAB    There is no refill protocol information for this order        Routing refill request to provider for review/approval because:  Drug not on the [facility] refill protocol     See Cent[place] RN, BSN

## 2018-06-20 DIAGNOSIS — E55.9 VITAMIN D DEFICIENCY: ICD-10-CM

## 2018-06-20 NOTE — TELEPHONE ENCOUNTER
"Requested Prescriptions   Pending Prescriptions Disp Refills     Cholecalciferol (VITAMIN D) 2000 units tablet [Pharmacy Med Name: VITAMIN D-3 2,000 UNIT TAB 2000 TAB]    Last Written Prescription Date:  6/19/17  Last Fill Quantity: 100,  # refills: 3   Last Office Visit with G, P or [facility] prescribing provider:  5/23/18   Future Office Visit:      100 tablet 3     Sig: TAKE 1 TABLET BY MOUTH ONCE DAILY // IB HNUB NOJ IB LUB    Vitamin Supplements (Adult) Protocol Passed    6/20/2018 11:36 AM       Passed - High dose Vitamin D not ordered       Passed - Recent (12 mo) or future (30 days) visit within the authorizing provider's specialty    Patient had office visit in the last 12 months or has a visit in the next 30 days with authorizing provider or within the authorizing provider's specialty.  See \"Patient Info\" tab in inbasket, or \"Choose Columns\" in Meds & Orders section of the refill encounter.                  Tomy Faarax  [facility]  "

## 2018-06-22 RX ORDER — CHOLECALCIFEROL (VITAMIN D3) 50 MCG
TABLET ORAL
Qty: 100 TABLET | Refills: 3 | Status: SHIPPED | OUTPATIENT
Start: 2018-06-22 | End: 2019-05-29

## 2018-06-22 NOTE — TELEPHONE ENCOUNTER
Routing refill request to provider for review/approval because:  Needs repeat Vitamin D labs done. Has not had this rechecked. Unclear if pt to remain on OTC dosage at this time.    Miranda Montez RN  [facility]

## 2018-08-11 DIAGNOSIS — D50.8 OTHER IRON DEFICIENCY ANEMIA: ICD-10-CM

## 2018-08-11 NOTE — TELEPHONE ENCOUNTER
"Requested Prescriptions   Pending Prescriptions Disp Refills     ferrous gluconate (FERGON) 324 (38 Fe) MG tablet [Pharmacy Med Name: FERROUS GLUCONATE 324MG  (38 FE) TAB] 90 tablet 1    Last Written Prescription Date:  5/25/18  Last Fill Quantity: 90,  # refills: 1   Last Office Visit with G, P or [facility] prescribing provider:  5/23/2018     Future Office Visit:      Sig: TAKE 1 TABLET BY MOUTH TWICE DAILY // IB ZAUG NOJ IB LUB, IB HNUB NOJ OB ZAUG PAB JAHAIRA NTSHAV LIAB    Iron Supplements Passed    8/11/2018 11:47 AM       Passed - Patient is 12 years of age or older       Passed - Recent (12 mo) or future (30 days) visit within the authorizing provider's specialty    Patient had office visit in the last 12 months or has a visit in the next 30 days with authorizing provider or within the authorizing provider's specialty.  See \"Patient Info\" tab in inbasket, or \"Choose Columns\" in Meds & Orders section of the refill encounter.           Passed - Hgb OR Hct on record within the past 12 mos.    Patient need only have had a HGB or HCT on file in the past 12 mos. That result does not need to be normal.    Recent Labs   Lab Test  11/08/17   1123  05/02/16   1141  02/10/16   0938   HGB  13.0  12.1  10.8*     Recent Labs   Lab Test  05/02/16   1141  02/10/16   0938  11/02/15   1111   HCT  36.7  34.3*  35.4       Please verify a HGB or HCT has been checked SINCE THE LAST DOSE CHANGE.              "

## 2018-08-13 RX ORDER — FERROUS GLUCONATE 324(38)MG
TABLET ORAL
Qty: 90 TABLET | Refills: 1 | OUTPATIENT
Start: 2018-08-13

## 2018-08-13 NOTE — TELEPHONE ENCOUNTER
Routing refill request to provider for review/approval because:  Labs not current:  HCT or Hgb.     Laura Ibarra RN

## 2018-08-13 NOTE — TELEPHONE ENCOUNTER
Denied.  Patient needs visit with labs.  Please have her schedule.  GREGORY Freeman CNP (covering)

## 2018-08-14 NOTE — TELEPHONE ENCOUNTER
Called and spoke to son and explained that patient needs to be seen to process refill request, son understands and will call back to schedule the appointment.  Harini Askew MA/  For Teams Shaheen

## 2019-01-08 ENCOUNTER — TRANSFERRED RECORDS (OUTPATIENT)
Dept: HEALTH INFORMATION MANAGEMENT | Facility: CLINIC | Age: 59
End: 2019-01-08

## 2019-01-09 ENCOUNTER — OFFICE VISIT (OUTPATIENT)
Dept: FAMILY MEDICINE | Facility: CLINIC | Age: 59
End: 2019-01-09
Payer: MEDICAID

## 2019-01-09 VITALS
OXYGEN SATURATION: 100 % | DIASTOLIC BLOOD PRESSURE: 64 MMHG | BODY MASS INDEX: 18.88 KG/M2 | HEIGHT: 55 IN | SYSTOLIC BLOOD PRESSURE: 102 MMHG | HEART RATE: 57 BPM | TEMPERATURE: 97.4 F | WEIGHT: 81.6 LBS

## 2019-01-09 DIAGNOSIS — N83.202 LEFT OVARIAN CYST: ICD-10-CM

## 2019-01-09 DIAGNOSIS — F33.1 MODERATE EPISODE OF RECURRENT MAJOR DEPRESSIVE DISORDER (H): ICD-10-CM

## 2019-01-09 DIAGNOSIS — R16.0 LIVER MASSES: ICD-10-CM

## 2019-01-09 DIAGNOSIS — K29.50 CHRONIC GASTRITIS, PRESENCE OF BLEEDING UNSPECIFIED, UNSPECIFIED GASTRITIS TYPE: ICD-10-CM

## 2019-01-09 DIAGNOSIS — N20.0 NEPHROLITHIASIS: ICD-10-CM

## 2019-01-09 DIAGNOSIS — R10.31 RLQ ABDOMINAL PAIN: Primary | ICD-10-CM

## 2019-01-09 DIAGNOSIS — E55.9 VITAMIN D DEFICIENCY: ICD-10-CM

## 2019-01-09 PROCEDURE — 99214 OFFICE O/P EST MOD 30 MIN: CPT | Performed by: FAMILY MEDICINE

## 2019-01-09 PROCEDURE — T1013 SIGN LANG/ORAL INTERPRETER: HCPCS | Mod: U3 | Performed by: FAMILY MEDICINE

## 2019-01-09 ASSESSMENT — MIFFLIN-ST. JEOR: SCORE: 790.39

## 2019-01-09 ASSESSMENT — PAIN SCALES - GENERAL: PAINLEVEL: WORST PAIN (10)

## 2019-01-09 NOTE — PATIENT INSTRUCTIONS
At [facility], we strive to deliver an exceptional experience to you, every time we see you.  If you receive a survey in the mail, please send us back your thoughts. We really do value your feedback.    Your care team:                            Family Medicine Internal Medicine   MD Skinny Evans MD Shantel Branch-Fleming, MD Katya Georgiev PA-C Megan Hill, APRN LORNA Lo MD Pediatrics   Itz Morales, MARSHA Bradshaw, MD Cathy Nicholas APRN CNP   MD Simran Bowie MD Deborah Mielke, MD Dinorah Lundberg, APRN [facility]      Clinic hours: Monday - Thursday 7 am-7 pm; Fridays 7 am-5 pm.   Urgent care: Monday - Friday 11 am-9 pm; Saturday and Sunday 9 am-5 pm.  Pharmacy : Monday -Thursday 8 am-8 pm; Friday 8 am-6 pm; Saturday and Sunday 9 am-5 pm.     Clinic: [phone]   Pharmacy: [phone]

## 2019-01-09 NOTE — PROGRESS NOTES
SUBJECTIVE:   Gricelda Navarro is a 58 year old female who presents to clinic today for the following health issues:      ED/UC Followup:    Facility:  [facility]  Date of visit: 1/3/2019  Reason for visit: ADB pain  Current Status: Still painful     -Patient states she is still having the pain and it is not improving. Was given tylenol and IBU with no relief. Pain present 10 days.  Pain described sharp and does not radiate. Had some blood in urine so seen in ED on 1/3/19.  CT and Ultrasound showed right very small renal stones and left ovarian cysts.    Seen by [facility] yesterday. Had blood test to check for cancer and ovary removal recommended.    Depression - not taking medication currently and not interested at this time.    Vitamin D - took weekly dose but not taking daily dose regularly.    Chronic gastritis - taking ranitidine and no heartburn symptoms.    Problem list and histories reviewed & adjusted, as indicated.  Additional history: as documented    Patient Active Problem List   Diagnosis     Recurrent cold sores     Gastritis     Back pain     Major depression     Generalized anxiety disorder     Seasonal allergic rhinitis     Iron deficiency anemia     CARDIOVASCULAR SCREENING; LDL GOAL LESS THAN 160     Vitamin D deficiency     External hemorrhoids     Internal hemorrhoids     Pelvic floor dysfunction     RLQ abdominal pain     Nephrolithiasis     Left ovarian cyst     Past Surgical History:   Procedure Laterality Date     C/SECTION, LOW TRANSVERSE      , Low Transverse     COLONOSCOPY WITH CO2 INSUFFLATION N/A 2017    Procedure: COLONOSCOPY WITH CO2 INSUFFLATION;  colonoscopy/Dr. Destiney Cuba/Rectal mass [K62.9]  - [facility]: [facility]: [phone];  Surgeon: John Aguilar MD;  Location: [facility] OR     TUBAL LIGATION         Social History     Tobacco Use     Smoking status: Never Smoker     Smokeless tobacco: Never Used   Substance Use Topics     Alcohol  "use: No     Family History   Problem Relation Age of Onset     Unknown/Adopted Mother      Unknown/Adopted Father            Reviewed and updated as needed this visit by clinical staff  Tobacco  Allergies  Meds  Problems  Med Hx  Surg Hx  Fam Hx  Soc Hx        Reviewed and updated as needed this visit by Provider  Tobacco  Allergies  Meds  Problems  Med Hx  Surg Hx  Fam Hx         ROS:  Constitutional, HEENT, cardiovascular, pulmonary, GI, , musculoskeletal, neuro, skin, endocrine and psych systems are negative, except as otherwise noted.    OBJECTIVE:     /64 (BP Location: Right arm, Patient Position: Chair, Cuff Size: Adult Regular)   Pulse 57   Temp 97.4  F (36.3  C) (Oral)   Ht 1.386 m (4' 6.57\")   Wt 37 kg (81 lb 9.6 oz)   LMP 12/25/2013   SpO2 100%   Breastfeeding? No   BMI 19.27 kg/m    Body mass index is 19.27 kg/m .  GENERAL: healthy, alert and no distress  NECK: no adenopathy, no asymmetry, masses, or scars and thyroid normal to palpation  RESP: lungs clear to auscultation - no rales, rhonchi or wheezes  CV: regular rate and rhythm, normal S1 S2, no S3 or S4, no murmur, click or rub, no peripheral edema and peripheral pulses strong  ABDOMEN: soft, nontender, no hepatosplenomegaly, no masses and bowel sounds normal  MS: no gross musculoskeletal defects noted, no edema  PSYCH: mentation appears normal and affect flat    Care Everywhere reviewed.    ASSESSMENT/PLAN:     1. RLQ abdominal pain  Referral to urology per patient requestin  - UROLOGY ADULT REFERRAL    2. Left ovarian cyst  Await  and further treatment based on that    3. Nephrolithiasis  As above  - UROLOGY ADULT REFERRAL    4. Liver masses  Await [id number] and then further assessment as needed    5. Moderate episode of recurrent major depressive disorder (H)  Medication refused for now    6. Vitamin D deficiency  Recommended recheck as could have contributed to renal stones but refused for now.    7. Chronic " gastritis, presence of bleeding unspecified, unspecified gastritis type  Stable - refilled.  - ranitidine (ZANTAC) 300 MG tablet; TAKE 1 TABLET BY MOUTH AT BEDTIME DAILY FOR STOMACH//IB HNUB NOJ 1 LUB THAUM MUS PW PAB JAHAIRA MOB PLAB (NCAUJ PLAB)  Dispense: 90 tablet; Refill: 1    The uses and side effects, including black box warnings as appropriate, were discussed in detail.  All patient questions were answered.  The patient was instructed to call immediately if any side effects developed.     FUTURE APPOINTMENTS:       - Follow-up visit in 2 - 4 weeks after  is back.    Marycarmen Cuba MD  [facility]

## 2019-01-10 ENCOUNTER — TELEPHONE (OUTPATIENT)
Dept: UROLOGY | Facility: CLINIC | Age: 59
End: 2019-01-10

## 2019-01-10 NOTE — TELEPHONE ENCOUNTER
LVM on Kely Garrison telephone (emergency contact) as pt phone number will not go through, message on phone says the line is invalid. VM was for a previsit, said to call back.    Randa Coleman LPN

## 2019-01-11 ENCOUNTER — OFFICE VISIT (OUTPATIENT)
Dept: UROLOGY | Facility: CLINIC | Age: 59
End: 2019-01-11
Payer: MEDICAID

## 2019-01-11 VITALS — OXYGEN SATURATION: 98 % | SYSTOLIC BLOOD PRESSURE: 95 MMHG | DIASTOLIC BLOOD PRESSURE: 51 MMHG | HEART RATE: 69 BPM

## 2019-01-11 DIAGNOSIS — R31.0 GROSS HEMATURIA: ICD-10-CM

## 2019-01-11 DIAGNOSIS — R10.31 RLQ ABDOMINAL PAIN: ICD-10-CM

## 2019-01-11 DIAGNOSIS — N20.0 NEPHROLITHIASIS: Primary | ICD-10-CM

## 2019-01-11 LAB
ALBUMIN UR-MCNC: NEGATIVE MG/DL
APPEARANCE UR: CLEAR
BILIRUB UR QL STRIP: NEGATIVE
COLOR UR AUTO: NORMAL
GLUCOSE UR STRIP-MCNC: NEGATIVE MG/DL
HGB UR QL STRIP: NEGATIVE
KETONES UR STRIP-MCNC: NEGATIVE MG/DL
LEUKOCYTE ESTERASE UR QL STRIP: NEGATIVE
NITRATE UR QL: NEGATIVE
PH UR STRIP: 7 PH (ref 5–7)
SOURCE: NORMAL
SP GR UR STRIP: 1.01 (ref 1–1.03)
UROBILINOGEN UR STRIP-MCNC: NORMAL MG/DL (ref 0–2)

## 2019-01-11 PROCEDURE — 99203 OFFICE O/P NEW LOW 30 MIN: CPT | Performed by: PHYSICIAN ASSISTANT

## 2019-01-11 PROCEDURE — 81003 URINALYSIS AUTO W/O SCOPE: CPT | Performed by: PHYSICIAN ASSISTANT

## 2019-01-11 PROCEDURE — 87086 URINE CULTURE/COLONY COUNT: CPT | Performed by: PHYSICIAN ASSISTANT

## 2019-01-11 PROCEDURE — 88112 CYTOPATH CELL ENHANCE TECH: CPT | Performed by: PHYSICIAN ASSISTANT

## 2019-01-11 PROCEDURE — T1013 SIGN LANG/ORAL INTERPRETER: HCPCS | Mod: U3 | Performed by: PHYSICIAN ASSISTANT

## 2019-01-11 ASSESSMENT — PAIN SCALES - GENERAL: PAINLEVEL: EXTREME PAIN (9)

## 2019-01-11 NOTE — PROGRESS NOTES
CC:abdominal pain, blood in the urine    HPI: It is a pleasure to see Ms. Gricelda Navarro, a very pleasant 58 year old female, asked to be seen in consultation by Dr. Destiney Cuba for evaluation of abdominal pain and blood in the urine. History is obtained from the patient with the aid of a professional [facility]  and supplemented by chart review. She has chronic abdominal pain, primarily located in the right lower quadrant, which started after a remote abdominal surgery (? tubal ligation) in [place]. Then on 12/31/18, she had acute worsening of her abdominal pain and also developed bright red blood in the urine as well as some urinary frequency and urgency. She presented to an outside ED on 1/3/19 where workup included a urine dip showing large blood and small leukocyte esterase (no urine microscopy or culture performed), normal/slightly low serum creatinine (0.54), and normal WBC (4.9). CT A/P w/o contrast was obtained showing multiple hepatic masses, complex cyst/mass in the left ovary, two tiny non-obstructing right renal calculi, but no obstructing stones,  masses, or hydronephrosis. Per chart review, patient has seen gynecology who recommended oophorectomy. She is referred to urology today for ongoing evaluation of RLQ abdominal pain, hematuria, and kidney stones.    Today, she reports no further blood in the urine since the day she presented to the ED (1/3/19). She is fairly confident that this did not represent vaginal bleeding (states that she does not have periods). Her frequency and urgency symptoms have also improved. She denies hesitancy, intermittency, feelings of incomplete emptying, or any recent history of urinary tract infections. No prior history of kidney stones.     Past Medical History:   Diagnosis Date     Back pain      KELSIE (generalised anxiety disorder)      Gastritis      Iron deficiency anemia      Major depression      Recurrent cold sores      Seasonal allergic rhinitis      Past  Surgical History:   Procedure Laterality Date     C/SECTION, LOW TRANSVERSE      , Low Transverse     COLONOSCOPY WITH CO2 INSUFFLATION N/A 2017    Procedure: COLONOSCOPY WITH CO2 INSUFFLATION;  colonoscopy/Dr. Destiney Cuba/Rectal mass [K62.9]  - [facility]: [phone];  Surgeon: John Aguilar MD;  Location: [facility] OR     TUBAL LIGATION       Current Outpatient Medications   Medication Sig Dispense Refill     Cholecalciferol (VITAMIN D) 2000 units tablet TAKE 1 TABLET BY MOUTH ONCE DAILY // IB HNUB NOJ IB  tablet 3     ferrous gluconate (FERGON) 324 (38 Fe) MG tablet TAKE 1 TABLET BY MOUTH TWICE DAILY // IB ZAUG NOJ IB LUB, IB HNUB NOJ OB ZAUG PAB JAHAIRA NTSHAV LIAB 90 tablet 1     ranitidine (ZANTAC) 300 MG tablet TAKE 1 TABLET BY MOUTH AT BEDTIME DAILY FOR STOMACH//IB HNUB NOJ 1 LUB THAUM MUS PW PAB JAHAIRA MOB PLAB (NCAUJ PLAB) 90 tablet 1     diclofenac (VOLTAREN) 1 % GEL Apply 2 grams to affected areas up to four times daily using enclosed dosing card. (Patient not taking: Reported on 2019) 100 g 1     hydrocortisone (ANUSOL-HC) 2.5 % cream Place rectally 2 times daily (Patient not taking: Reported on 2019) 30 g 3     loratadine (CLARITIN REDITABS) 10 MG dissolvable tablet Take 10 mg by mouth daily       Allergies   Allergen Reactions     Prednisone Hives     Cortisone Itching, Swelling and Rash     Cyclobenzaprine Rash     FAMILY HISTORY: There is no reported history of genitourinary carcinoma.  There is no history of urolithiasis.      SOCIAL HISTORY: The patient does not smoke cigarettes, no EtOH and no illicit drug use.    ROS: A comprehensive 14 point ROS was obtained and was positive for RLQ abdominal pain and otherwise negative except for that outlined above in the HPI.    PHYSICAL EXAM:   Vitals:    19 1414   BP: 95/51   Pulse: 69   SpO2: 98%     GENERAL: Well groomed, well developed, well nourished female in NAD.  HEENT: AT, NC, EOMI  bilaterally.  SKIN: Warm to touch, dry.  No visible rashes or lesions on examined areas.  RESP: No increased respiratory effort.  MS: Full ROM in extremities.  PELVIC: Deferred for now.   NEURO: Alert and oriented x 3.  PSYCH: Normal mood and affect, pleasant and agreeable during interview and exam.    IMAGING:   CT A/P w/o contrast   1/3/19  Right Kidney /Urinary Tract:   There are 2 adjacent 2 mm nonobstructing calculi in the lower pole of the right kidney. There is no evidence of ureteral calculi, ureteral dilatation or hydronephrosis.    Left Kidney /Urinary Tract:    No renal calculi are identified. There is no evidence of ureteral calculi, ureteral dilatation or hydronephrosis.    IMPRESSION:   1.  Multiple hepatic masses, measuring up to 6.0 cm in diameter. Recommend correlation with a multiphase MR or CT examination of the abdomen and pelvis (liver) for further evaluation.  2.  Complex cystic appearance of the left ovary/adnexa with either multiple adjacent left ovarian cysts versus a complex septated cystic lesion with an aggregate measurement of 4.3 cm. Recommend correlation with a pelvic ultrasound for further evaluation.  3.  Subtle ovoid lucent areas within the lower spine and iliac bones, nonspecific and indeterminate for localized osteopenia versus lytic osseous lesions. Metastatic disease is not entirely excluded.  4.  Two tiny nonobstructing right renal calculi.  5.  No evidence of ureteral calculi or urinary tract obstruction.      ASSESSMENT and PLAN:    Ms. Gricelda Navarro is a pleasant 58 year old female with chronic RLQ abdominal pain, two tiny non-obstructing stones in the right kidney, and possible gross hematuria versus vaginal bleeding that occurred on 1/3/19 and has since resolved.   -UA today completely negative with no blood.  -Given the questionable episode of gross hematuria, will also send urine for culture and cytology.  -If cytology returns abnormal, will schedule for cystoscopy.    -Otherwise, we discussed that it is possible she may have passed a small stone prior to her CT scan which could have accounted for the hematuria and abdominal pain. However, would have expected her abdominal pain to improve after stone passage. The fact that her RLQ abdominal pain is so chronic and has persisted despite other urinary symptoms resolving, it seems unlikely to be related to any urologic source. The two tiny non-obstructing stones in her right kidney are unlikely to be the source of any abdominal pain and we discussed this in detail. She verbalizes understanding and agreement.  -Standard kidney stone prevention measures discussed, particularly increasing water intake as she reports drinking very little to no water.  -Recommend she follow up with PCP to further address chronic abdominal pain.  -Patient and daughter were given clear instructions to follow up with urology should gross hematuria recur in the future which would also warrant evaluation with cystoscopy and possibly upper tract imaging with contrast.     Thank you for allowing me to participate in Ms. Navarro's care. I will keep you updated of her progress, but please do not hesitate to contact me with any questions.    About 30 minutes were spent with the patient today, > 50% in counseling and coordination of care.    Yajaira Arroyo PA-C  Department of Urology

## 2019-01-11 NOTE — NURSING NOTE
Gricelda Navarro's goals for this visit include:   Chief Complaint   Patient presents with     Consult     Abdominal pain        She requests these members of her care team be copied on today's visit information: yes    PCP: Marycarmen Taylor    Referring Provider:  Marycarmen Cuba MD  [id number]  [facility] [place] [address]    BP 95/51   Pulse 69   LMP 12/25/2013   SpO2 98%     Do you need any medication refills at today's visit? no

## 2019-01-12 LAB
BACTERIA SPEC CULT: NORMAL
SPECIMEN SOURCE: NORMAL

## 2019-01-14 LAB — COPATH REPORT: NORMAL

## 2019-01-15 ENCOUNTER — TELEPHONE (OUTPATIENT)
Dept: UROLOGY | Facility: CLINIC | Age: 59
End: 2019-01-15

## 2019-01-15 NOTE — TELEPHONE ENCOUNTER
Notes recorded by Randa Coleman LPN on 1/15/2019 at 8:18 AM CST  Called Lilo (consent to communicate on file) and made aware of results. Had no questions or concerns.     Randa Coleman LPN    ------    Notes recorded by Yajaira Arroyo PA-C on 1/15/2019 at 7:01 AM CST  Please call patient's daughter, Lilo (phone number under comments section of demographics), per patient request to inform them that her culture is negative - no infection, and the urine cytology is negative - no cancer cells detected.  No need to follow up with urology unless she sees blood in her urine in the future.  Thanks!  Yajaira Arroyo PA-C

## 2019-01-16 ENCOUNTER — OFFICE VISIT (OUTPATIENT)
Dept: FAMILY MEDICINE | Facility: CLINIC | Age: 59
End: 2019-01-16
Payer: MEDICAID

## 2019-01-16 VITALS
TEMPERATURE: 97.6 F | SYSTOLIC BLOOD PRESSURE: 115 MMHG | HEART RATE: 64 BPM | HEIGHT: 55 IN | WEIGHT: 81.6 LBS | RESPIRATION RATE: 18 BRPM | OXYGEN SATURATION: 100 % | DIASTOLIC BLOOD PRESSURE: 66 MMHG | BODY MASS INDEX: 18.88 KG/M2

## 2019-01-16 DIAGNOSIS — R16.0 LIVER MASSES: ICD-10-CM

## 2019-01-16 DIAGNOSIS — Z23 NEED FOR PROPHYLACTIC VACCINATION AND INOCULATION AGAINST INFLUENZA: ICD-10-CM

## 2019-01-16 DIAGNOSIS — N20.0 NEPHROLITHIASIS: ICD-10-CM

## 2019-01-16 DIAGNOSIS — N83.202 LEFT OVARIAN CYST: ICD-10-CM

## 2019-01-16 DIAGNOSIS — Z12.31 VISIT FOR SCREENING MAMMOGRAM: ICD-10-CM

## 2019-01-16 DIAGNOSIS — R10.31 RLQ ABDOMINAL PAIN: Primary | ICD-10-CM

## 2019-01-16 DIAGNOSIS — Z11.4 SCREENING FOR HIV (HUMAN IMMUNODEFICIENCY VIRUS): ICD-10-CM

## 2019-01-16 PROCEDURE — 90471 IMMUNIZATION ADMIN: CPT | Performed by: FAMILY MEDICINE

## 2019-01-16 PROCEDURE — 90682 RIV4 VACC RECOMBINANT DNA IM: CPT | Performed by: FAMILY MEDICINE

## 2019-01-16 PROCEDURE — 99214 OFFICE O/P EST MOD 30 MIN: CPT | Mod: 25 | Performed by: FAMILY MEDICINE

## 2019-01-16 PROCEDURE — T1013 SIGN LANG/ORAL INTERPRETER: HCPCS | Mod: U3 | Performed by: FAMILY MEDICINE

## 2019-01-16 ASSESSMENT — ANXIETY QUESTIONNAIRES
3. WORRYING TOO MUCH ABOUT DIFFERENT THINGS: NEARLY EVERY DAY
7. FEELING AFRAID AS IF SOMETHING AWFUL MIGHT HAPPEN: NEARLY EVERY DAY
GAD7 TOTAL SCORE: 20
1. FEELING NERVOUS, ANXIOUS, OR ON EDGE: NEARLY EVERY DAY
5. BEING SO RESTLESS THAT IT IS HARD TO SIT STILL: MORE THAN HALF THE DAYS
2. NOT BEING ABLE TO STOP OR CONTROL WORRYING: NEARLY EVERY DAY
6. BECOMING EASILY ANNOYED OR IRRITABLE: NEARLY EVERY DAY
IF YOU CHECKED OFF ANY PROBLEMS ON THIS QUESTIONNAIRE, HOW DIFFICULT HAVE THESE PROBLEMS MADE IT FOR YOU TO DO YOUR WORK, TAKE CARE OF THINGS AT HOME, OR GET ALONG WITH OTHER PEOPLE: VERY DIFFICULT

## 2019-01-16 ASSESSMENT — PATIENT HEALTH QUESTIONNAIRE - PHQ9
5. POOR APPETITE OR OVEREATING: NEARLY EVERY DAY
SUM OF ALL RESPONSES TO PHQ QUESTIONS 1-9: 22

## 2019-01-16 ASSESSMENT — PAIN SCALES - GENERAL: PAINLEVEL: WORST PAIN (10)

## 2019-01-16 ASSESSMENT — MIFFLIN-ST. JEOR: SCORE: 781.39

## 2019-01-16 NOTE — PROGRESS NOTES
"  SUBJECTIVE:   Gricelda Navarro is a 58 year old female who presents to clinic today for the following health issues:    Language line  [id number] used along with daughter Lilo for interpretation.    ABDOMINAL   PAIN Follow up     Onset: ongoing since last office visit from 19 - Patient with concerns that there may be something wrong on the inside like ulcer or cut    Description:   Character: \"hurts like a knife cut the inside of stomach\"  Location: RLQ abdomen  Radiation: None    Intensity: 10/10, severe    Progression of Symptoms:  worsening    Accompanying Signs & Symptoms:  Fever/Chills?: no   Gas/Bloating: YES- when eats cold food  Nausea: no but low appetite  Vomitting: no   Diarrhea?: no   Constipation:no   Dysuria or Hematuria: no    History:   Trauma: no   Previous similar pain: YES   Previous tests done: yes at hospital    Precipitating factors:   Does the pain change with:     Food: YES    BM: no     Urination: no     Alleviating factors:  none    Therapies Tried and outcome: Ibuprofen    LMP:  not applicable     Seen by urology and no concern about stones.  Seen by OBGYN and  done and normal. But still recommended left ovarian cyst removal but patient is not interested at this time.  Liver masses seen on CT scan and have not yet been evaluated.      Problem list and histories reviewed & adjusted, as indicated.  Additional history: as documented    Patient Active Problem List   Diagnosis     Recurrent cold sores     Gastritis     Back pain     Major depression     Generalized anxiety disorder     Seasonal allergic rhinitis     Iron deficiency anemia     CARDIOVASCULAR SCREENING; LDL GOAL LESS THAN 160     Vitamin D deficiency     External hemorrhoids     Internal hemorrhoids     Pelvic floor dysfunction     RLQ abdominal pain     Nephrolithiasis     Left ovarian cyst     Liver masses     Past Surgical History:   Procedure Laterality Date     C/SECTION, LOW TRANSVERSE      , Low " "Transverse     COLONOSCOPY WITH CO2 INSUFFLATION N/A 11/20/2017    Procedure: COLONOSCOPY WITH CO2 INSUFFLATION;  colonoscopy/Dr. Destiney Cuba/Rectal mass [K62.9]  - Primary /BMi: [facility]: [phone];  Surgeon: John Aguilar MD;  Location: [facility] OR     TUBAL LIGATION         Social History     Tobacco Use     Smoking status: Never Smoker     Smokeless tobacco: Never Used   Substance Use Topics     Alcohol use: No     Family History   Problem Relation Age of Onset     Unknown/Adopted Mother      Unknown/Adopted Father            Reviewed and updated as needed this visit by clinical staff  Tobacco  Allergies  Meds  Problems  Med Hx  Surg Hx  Fam Hx  Soc Hx        Reviewed and updated as needed this visit by Provider  Tobacco  Allergies  Meds  Problems  Med Hx  Surg Hx  Fam Hx         ROS:  Constitutional, HEENT, cardiovascular, pulmonary, gi and gu systems are negative, except as otherwise noted.    OBJECTIVE:     /66 (BP Location: Left arm, Patient Position: Chair, Cuff Size: Adult Regular)   Pulse 64   Temp 97.6  F (36.4  C) (Oral)   Resp 18   Ht 1.372 m (4' 6\")   Wt 37 kg (81 lb 9.6 oz)   LMP 12/25/2013   SpO2 100%   BMI 19.67 kg/m    Body mass index is 19.67 kg/m .  GENERAL: healthy, alert and no distress  NECK: no adenopathy, no asymmetry, masses, or scars and thyroid normal to palpation  RESP: lungs clear to auscultation - no rales, rhonchi or wheezes  CV: regular rate and rhythm, normal S1 S2, no S3 or S4, no murmur, click or rub, no peripheral edema and peripheral pulses strong  ABDOMEN: soft, nontender, no hepatosplenomegaly, no masses and bowel sounds normal  MS: no gross musculoskeletal defects noted, no edema  PSYCH: mentation appears normal and affect flat    Diagnostic Test Results:  Results for orders placed or performed in visit on 01/11/19   UA reflex to Microscopic and Culture   Result Value Ref Range    Color Urine Light Yellow     Appearance Urine Clear     " Glucose Urine Negative NEG^Negative mg/dL    Bilirubin Urine Negative NEG^Negative    Ketones Urine Negative NEG^Negative mg/dL    Specific Gravity Urine 1.006 1.003 - 1.035    Blood Urine Negative NEG^Negative    pH Urine 7.0 5.0 - 7.0 pH    Protein Albumin Urine Negative NEG^Negative mg/dL    Urobilinogen mg/dL Normal 0.0 - 2.0 mg/dL    Nitrite Urine Negative NEG^Negative    Leukocyte Esterase Urine Negative NEG^Negative    Source Midstream Urine    Cytology non gyn   Result Value Ref Range    Copath Report       Patient Name: ADOLFO CHEATHAM  MR#: [id number]  Specimen #: [id number]  Collected: 1/11/2019  Received: 1/14/2019  Reported: 1/14/2019 14:23  Ordering Phy(s): CANDIDO WOODSON    For improved result formatting, select 'View Enhanced Report Format' under   Linked Documents section.    SPECIMEN/STAIN PROCESS:  Urine, voided       Pap-Cyto x 1    ----------------------------------------------------------------    CYTOLOGIC INTERPRETATION:     Urine, voided:   -Negative for High-Grade Urothelial Carcinoma  Specimen Adequacy: Satisfactory for evaluation.    I have personally reviewed all specimens and/or slides, including the   listed special stains, and used them  with my medical judgement to determine or confirm the final diagnosis.    Electronically signed out by:  Kevin Estrada M.D., [facility]    Processed and screened at [facility]    CLINICAL HISTORY:  Patient is 58 year old female with gross hematuria.    ,    GROSS:  Urine , voided:  Received 30 ml of pale yellow, clear fluid, processed as 1   Pap stained Autocyte..    MICROSCOPIC:  Microscopic examination is performed.    Angelica Gutierrez MD, Cytopathology Fellow   Kevin Estrada MD    CPT Codes:  A: 15994-UKXRQLI    TESTING LAB LOCATION:  [facility], [address]   [address] [phone]    COLLECTION  SITE:  Client:  [facility], [place]  Location:  [facility])    Resident  [id number]     Urine Culture Aerobic Bacterial   Result Value Ref Range    Specimen Description Midstream Urine     Culture Micro       <10,000 colonies/mL  urogenital chito  Susceptibility testing not routinely done         ASSESSMENT/PLAN:     1. RLQ abdominal pain  Possible etiologies discussed - imaging for now and consider colonoscopy and/or pelvic floor PT  - MR Abdomen & Pelvis w/o & w Contrast; Future    2. Liver masses  imaging  - MR Abdomen & Pelvis w/o & w Contrast; Future    3. Left ovarian cyst  Monitor for now as patient not interested in removal    4. Nephrolithiasis  Seen by urology and monitor    5. Screening for HIV (human immunodeficiency virus)  refused    6. Visit for screening mammogram    - MA SCREENING DIGITAL BILAT - Future  (s+30); Future    7. Need for prophylactic vaccination and inoculation against influenza    -      ADMIN VACCINE, FIRST [21026]    FUTURE APPOINTMENTS:       - Follow-up visit in 1 - 2 weeks for next steps.    Marycarmen Cuba MD  [facility]

## 2019-01-16 NOTE — NURSING NOTE

## 2019-01-16 NOTE — PATIENT INSTRUCTIONS
Call [phone] to schedule the MRI.  At [facility], we strive to deliver an exceptional experience to you, every time we see you.  If you receive a survey in the mail, please send us back your thoughts. We really do value your feedback.    Based on your medical history, these are the current health maintenance/preventive care services that you are due for (some may have been done at this visit.)  Health Maintenance Due   Topic Date Due     HIV SCREEN (SYSTEM ASSIGNED)  07/12/1979     DTAP/TDAP/TD IMMUNIZATION (1 - Tdap) 07/12/1986     ZOSTER IMMUNIZATION (1 of 2) 07/12/2011     MAMMO SCREEN Q2 YR (SYSTEM ASSIGNED)  08/19/2016     INFLUENZA VACCINE (1) 09/01/2018     PHQ-9 Q6 MONTHS  11/17/2018         Suggested websites for health information:  Www.[facility].Prelert : Up to date and easily searchable information on multiple topics.  Www.eReceipts.gov : medication info, interactive tutorials, watch real surgeries online  Www.familydoctor.org : good info from the Academy of Family Physicians  Www.cdc.gov : public health info, travel advisories, epidemics (H1N1)  Www.aap.org : children's health info, normal development, vaccinations  Www.health[facility].us : [place] dept of health, public health issues in [place], N1N1    Your care team:                            Family Medicine Internal Medicine   MD Skinny Evans MD Shantel Branch-Fleming, MD Katya Georgiev PA-C Nam Ho, MD Pediatrics   MARSHA Combs, LORNA JENKINS CNP   MD Simran Bowie MD Deborah Mielke, MD Kim Thein, APRN CNP      Clinic hours: Monday - Thursday 7 am-7 pm; Fridays 7 am-5 pm.   Urgent care: Monday - Friday 11 am-9 pm; Saturday and Sunday 9 am-5 pm.  Pharmacy : Monday -Thursday 8 am-8 pm; Friday 8 am-6 pm; Saturday and Sunday 9 am-5 pm.     Clinic: [phone]   Pharmacy: [phone]

## 2019-01-17 ASSESSMENT — ANXIETY QUESTIONNAIRES: GAD7 TOTAL SCORE: 20

## 2019-01-23 ENCOUNTER — ANCILLARY PROCEDURE (OUTPATIENT)
Dept: MRI IMAGING | Facility: CLINIC | Age: 59
End: 2019-01-23
Payer: MEDICAID

## 2019-01-23 ENCOUNTER — ANCILLARY PROCEDURE (OUTPATIENT)
Dept: MAMMOGRAPHY | Facility: CLINIC | Age: 59
End: 2019-01-23
Payer: MEDICAID

## 2019-01-23 ENCOUNTER — ANCILLARY ORDERS (OUTPATIENT)
Dept: FAMILY MEDICINE | Facility: CLINIC | Age: 59
End: 2019-01-23
Payer: MEDICAID

## 2019-01-23 DIAGNOSIS — Z12.31 VISIT FOR SCREENING MAMMOGRAM: ICD-10-CM

## 2019-01-23 DIAGNOSIS — R10.31 RLQ ABDOMINAL PAIN: ICD-10-CM

## 2019-01-23 DIAGNOSIS — R16.0 LIVER MASSES: ICD-10-CM

## 2019-01-23 PROCEDURE — 77067 SCR MAMMO BI INCL CAD: CPT | Performed by: RADIOLOGY

## 2019-01-23 PROCEDURE — A9581 GADOXETATE DISODIUM INJ: HCPCS | Performed by: FAMILY MEDICINE

## 2019-01-23 PROCEDURE — T1013 SIGN LANG/ORAL INTERPRETER: HCPCS | Mod: U3 | Performed by: FAMILY MEDICINE

## 2019-01-23 PROCEDURE — 74183 MRI ABD W/O CNTR FLWD CNTR: CPT | Performed by: RADIOLOGY

## 2019-01-24 ENCOUNTER — TELEPHONE (OUTPATIENT)
Dept: FAMILY MEDICINE | Facility: CLINIC | Age: 59
End: 2019-01-24

## 2019-01-24 DIAGNOSIS — R16.0 LIVER MASSES: Primary | ICD-10-CM

## 2019-01-24 DIAGNOSIS — D18.03 HEPATIC HEMANGIOMA: ICD-10-CM

## 2019-01-24 NOTE — TELEPHONE ENCOUNTER
Please inform patient her MRI showed hepatic hemangiomas.  These are clusters of blood vessels of sorts on the liver.  Normally they are benign and we do not follow them.  However, given the size of hers, a repeat MRI is recommended in 6 months.  This is not likely the cause of her pain.  Patient should follow up to discuss next steps for evaluating/treating her RLQ abdominal pain.    GREGORY Freeman CNP (covering)

## 2019-01-24 NOTE — TELEPHONE ENCOUNTER
This writer attempted to contact Gricelda on 01/24/19 via [employer]  #[phone]      Reason for call results/recommendations and left message.      If patient calls back:   Patient contacted by a Registered Nurse. Inform patient that someone from the RN group will contact them, document that pt called and route to P DYAD 3 RN POOL [[id number]]        Ivette Forde RN

## 2019-01-25 NOTE — TELEPHONE ENCOUNTER
This writer attempted to contact Gricelda on 01/25/19 via [email or web address]  #[phone]      Reason for call abnormal results and left message.      If patient calls back:   Patient contacted by a Registered Nurse. Inform patient that someone from the RN group will contact them, document that pt called and route to P DYAD 3 RN POOL [[id number]]        Laura Ibarra RN

## 2019-01-28 NOTE — TELEPHONE ENCOUNTER
This writer attempted to contact Lilo Madison's daughter and Gricelda via [employer]  on 01/28/19      Reason for call results and left message.      If patient calls back:   Patient contacted by a Registered Nurse. Inform patient that someone from the RN group will contact them, document that pt called and route to P DYAD 3 RN POOL [[id number]]        See Centeno RN, BSN

## 2019-01-29 NOTE — TELEPHONE ENCOUNTER
Called and spoke with patient's daughter, Lilo. Consent to communicate is on file and chart indicates to call Lilo with all test results.  Informed of MRI results as below, per Rosalinda Bradshaw. Lilo verbalized understanding. Education provided as to what hepatic hemangioma's are (via Up to Date). Lilo reports that patient is still complaining of RLQ abd pain so she will discuss results with patient and talk about returning to clinic for follow up and further discussion of pain. No other questions at this time.    Miranda Montez RN  [facility] Triage

## 2019-01-30 ENCOUNTER — OFFICE VISIT (OUTPATIENT)
Dept: FAMILY MEDICINE | Facility: CLINIC | Age: 59
End: 2019-01-30
Payer: MEDICAID

## 2019-01-30 VITALS
HEIGHT: 55 IN | HEART RATE: 65 BPM | OXYGEN SATURATION: 99 % | TEMPERATURE: 97.9 F | DIASTOLIC BLOOD PRESSURE: 65 MMHG | BODY MASS INDEX: 18.79 KG/M2 | SYSTOLIC BLOOD PRESSURE: 110 MMHG | RESPIRATION RATE: 12 BRPM | WEIGHT: 81.2 LBS

## 2019-01-30 DIAGNOSIS — M62.89 PELVIC FLOOR DYSFUNCTION: ICD-10-CM

## 2019-01-30 DIAGNOSIS — R10.31 RLQ ABDOMINAL PAIN: Primary | ICD-10-CM

## 2019-01-30 PROCEDURE — 99214 OFFICE O/P EST MOD 30 MIN: CPT | Performed by: FAMILY MEDICINE

## 2019-01-30 PROCEDURE — T1013 SIGN LANG/ORAL INTERPRETER: HCPCS | Mod: U3 | Performed by: FAMILY MEDICINE

## 2019-01-30 RX ORDER — GABAPENTIN 100 MG/1
100 CAPSULE ORAL 3 TIMES DAILY
Qty: 90 CAPSULE | Refills: 0 | Status: SHIPPED | OUTPATIENT
Start: 2019-01-30 | End: 2019-05-29

## 2019-01-30 ASSESSMENT — ANXIETY QUESTIONNAIRES
6. BECOMING EASILY ANNOYED OR IRRITABLE: SEVERAL DAYS
IF YOU CHECKED OFF ANY PROBLEMS ON THIS QUESTIONNAIRE, HOW DIFFICULT HAVE THESE PROBLEMS MADE IT FOR YOU TO DO YOUR WORK, TAKE CARE OF THINGS AT HOME, OR GET ALONG WITH OTHER PEOPLE: SOMEWHAT DIFFICULT
5. BEING SO RESTLESS THAT IT IS HARD TO SIT STILL: SEVERAL DAYS
7. FEELING AFRAID AS IF SOMETHING AWFUL MIGHT HAPPEN: SEVERAL DAYS
1. FEELING NERVOUS, ANXIOUS, OR ON EDGE: SEVERAL DAYS
GAD7 TOTAL SCORE: 9
3. WORRYING TOO MUCH ABOUT DIFFERENT THINGS: MORE THAN HALF THE DAYS
2. NOT BEING ABLE TO STOP OR CONTROL WORRYING: SEVERAL DAYS

## 2019-01-30 ASSESSMENT — MIFFLIN-ST. JEOR: SCORE: 779.57

## 2019-01-30 ASSESSMENT — PATIENT HEALTH QUESTIONNAIRE - PHQ9
5. POOR APPETITE OR OVEREATING: MORE THAN HALF THE DAYS
SUM OF ALL RESPONSES TO PHQ QUESTIONS 1-9: 13

## 2019-01-30 ASSESSMENT — PAIN SCALES - GENERAL: PAINLEVEL: EXTREME PAIN (9)

## 2019-01-30 NOTE — PATIENT INSTRUCTIONS
At [facility], we strive to deliver an exceptional experience to you, every time we see you.  If you receive a survey in the mail, please send us back your thoughts. We really do value your feedback.    Your care team:                            Family Medicine Internal Medicine   MD Skinny Evans MD Shantel Branch-Fleming, MD Katya Georgiev PA-C Megan Hill, APRN CNP    Clinton Lo MD Pediatrics   Itz Morales, MARSHA Bradshaw, MD Cathy Nicholas APRN CNP   MD Simran Bowie MD Deborah Mielke, MD Dinorah Lundberg, APRN [facility]      Clinic hours: Monday - Thursday 7 am-7 pm; Fridays 7 am-5 pm.   Urgent care: Monday - Friday 11 am-9 pm; Saturday and Sunday 9 am-5 pm.  Pharmacy : Monday -Thursday 8 am-8 pm; Friday 8 am-6 pm; Saturday and Sunday 9 am-5 pm.     Clinic: [phone]   Pharmacy: [phone]       Patient Education     Pelvic Organ Prolapse  Pelvic organ prolapse is when 1 or more organs inside the pelvis slip from their normal places. The pelvis is found between the waist and thighs. Normally, muscles and tissues in the pelvic region support the pelvic organs and hold them in place.  What is a normal pelvis?     Cutaway view of pelvis showing the small intestine, bladder, pubic bone, urethra, pelvic floor muscles, uterus, vagina, and rectum.   A. The small intestine absorbs nutrients from food.  B. The bladder collects and holds urine.  C. The pubic bone helps protect the pelvic organs.  D. The urethra is the tube that carries urine out of the body.  E. The pelvic floor muscles support organs and other structures in the pelvis.  F. The uterus is where the baby develops when a women is pregnant.  G. The vagina is the canal from the uterus to the outside of the body.  H. The rectum stores stool until a bowel movement occurs.  What causes pelvic organ prolapse?   There are several causes of pelvic organ prolapse  including:    Vaginal childbirth    Hereditary (genetic) factors    Connective tissue disorders    Getting older    Constant coughing (such as with bronchitis or smoking)    Heavy lifting    Chronic straining (such as with constipation)    Being overweight  What are the symptoms of pelvic organ prolapse?  The symptoms of pelvic organ prolapse include:    A feeling of fullness or pressure in your pelvis    A sense that a ball or lump is sticking out from the vagina    Problems passing urine or having a bowel movement    Urine leakage when you cough or use stairs. (But this can happen even without prolapse.)     Pain or pressure in your low back    Pain when having sex  Date Last Reviewed: 6/1/2017 2000-2018 [employer]. [address] [place] [address], [place] [address]. All rights reserved. This information is not intended as a substitute for professional medical care. Always follow your healthcare professional's instructions.

## 2019-01-30 NOTE — PROGRESS NOTES
SUBJECTIVE:   Gricelda Navarro is a 58 year old female who presents to clinic today for the following health issues:  Due to language barrier, an  was present during the history-taking and subsequent discussion (and for part of the physical exam) with this patient.    ED/UC Followup:    Facility: [facility]   Date of visit: 2019  Reason for visit: abdominal Pain RLQ  Current Status: The same, no change      ABDOMINAL   PAIN     Onset: follow up    Description:   Character: Sharp  Location: right lower quadrant  Radiation: Back, Pelvic region and breathing difficulties    Intensity: severe, 9/10    Progression of Symptoms:  worsening and constant    Accompanying Signs & Symptoms:  Fever/Chills?: YES  Gas/Bloating: YES  Nausea: no   Vomitting: no   Diarrhea?: no   Constipation:no   Dysuria or Hematuria: no    History:   Trauma: no   Previous similar pain: YES   Previous tests done: none    Precipitating factors:   Does the pain change with:     Food: Yes.       BM: no     Urination: no     Alleviating factors:  Deep breath for relieve    Therapies Tried and outcome: ibuprofen     LMP:  not applicable       Problem list and histories reviewed & adjusted, as indicated.  Additional history: as documented    Patient Active Problem List   Diagnosis     Recurrent cold sores     Gastritis     Back pain     Major depression     Generalized anxiety disorder     Seasonal allergic rhinitis     Iron deficiency anemia     CARDIOVASCULAR SCREENING; LDL GOAL LESS THAN 160     Vitamin D deficiency     External hemorrhoids     Internal hemorrhoids     Pelvic floor dysfunction     RLQ abdominal pain     Nephrolithiasis     Left ovarian cyst     Liver masses     Past Surgical History:   Procedure Laterality Date     C/SECTION, LOW TRANSVERSE      , Low Transverse     COLONOSCOPY WITH CO2 INSUFFLATION N/A 2017    Procedure: COLONOSCOPY WITH CO2 INSUFFLATION;  maribel/Dr. Cabello  "Cuba/Rectal mass [K62.9]  - [facility]: [phone];  Surgeon: John Aguilar MD;  Location: [facility] OR     TUBAL LIGATION         Social History     Tobacco Use     Smoking status: Never Smoker     Smokeless tobacco: Never Used   Substance Use Topics     Alcohol use: No     Family History   Problem Relation Age of Onset     Unknown/Adopted Mother      Unknown/Adopted Father            Reviewed and updated as needed this visit by clinical staff  Tobacco  Allergies  Meds  Problems  Med Hx  Surg Hx  Fam Hx  Soc Hx        Reviewed and updated as needed this visit by Provider  Tobacco  Allergies  Meds  Problems  Med Hx  Surg Hx  Fam Hx         ROS:  Constitutional, HEENT, cardiovascular, pulmonary, gi and gu systems are negative, except as otherwise noted.    OBJECTIVE:     /65 (BP Location: Left arm, Patient Position: Chair, Cuff Size: Child)   Pulse 65   Temp 97.9  F (36.6  C) (Oral)   Resp 12   Ht 1.372 m (4' 6\")   Wt 36.8 kg (81 lb 3.2 oz)   LMP 12/25/2013   SpO2 99%   BMI 19.58 kg/m    Body mass index is 19.58 kg/m .  GENERAL: healthy, alert and no distress  NECK: no adenopathy, no asymmetry, masses, or scars and thyroid normal to palpation  RESP: lungs clear to auscultation - no rales, rhonchi or wheezes  CV: regular rate and rhythm, normal S1 S2, no S3 or S4, no murmur, click or rub, no peripheral edema and peripheral pulses strong  ABDOMEN: soft, nontender, no hepatosplenomegaly, no masses and bowel sounds normal  MS: no gross musculoskeletal defects noted, no edema    Diagnostic Test Results:  Results for orders placed or performed in visit on 01/23/19   MA SCREENING DIGITAL BILAT - Future  (s+30)    Narrative    EXAMINATION: Bilateral digital screening mammography with computer  aided detection.    COMPARISON: None. Baseline.    HISTORY/FAMILY HISTORY: No symptoms, routine screening.    BREAST DENSITY: Heterogeneously dense.    COMMENTS: No suspicious finding,    "    Impression    IMPRESSION: BI-RADS CATEGORY: 1 -  NEGATIVE.    RECOMMENDED FOLLOW-UP: Annual Mammography    Results to be sent to the patient.     I have personally reviewed the examination and initial interpretation  and I agree with the findings.    AN MD CARLITOS       ASSESSMENT/PLAN:     1. RLQ abdominal pain  Discussed hemangiomas on MRI - recommend pelvic PT for dysfunction previously seen.  Consider GI referral if not improved after this.  Trial of gabapentin for pain as well  - [facility] REFERRAL; Future  - gabapentin (NEURONTIN) 100 MG capsule; Take 1 capsule (100 mg) by mouth 3 times daily  Dispense: 90 capsule; Refill: 0    2. Pelvic floor dysfunction   as above.  - [facility], [facility] REFERRAL; Future  - gabapentin (NEURONTIN) 100 MG capsule; Take 1 capsule (100 mg) by mouth 3 times daily  Dispense: 90 capsule; Refill: 0    The uses and side effects, including black box warnings as appropriate, were discussed in detail.  All patient questions were answered.  The patient was instructed to call immediately if any side effects developed.     FUTURE APPOINTMENTS:       - Follow-up visit in 2 - 4 weeks based on response to PT.    Marycarmen Cuba MD  [facility]

## 2019-01-31 ASSESSMENT — ANXIETY QUESTIONNAIRES: GAD7 TOTAL SCORE: 9

## 2019-02-06 ENCOUNTER — THERAPY VISIT (OUTPATIENT)
Dept: PHYSICAL THERAPY | Facility: CLINIC | Age: 59
End: 2019-02-06
Payer: COMMERCIAL

## 2019-02-06 DIAGNOSIS — R10.31 RLQ ABDOMINAL PAIN: ICD-10-CM

## 2019-02-06 DIAGNOSIS — M62.89 PELVIC FLOOR DYSFUNCTION: ICD-10-CM

## 2019-02-06 DIAGNOSIS — R10.84 ABDOMINAL PAIN, GENERALIZED: ICD-10-CM

## 2019-02-06 PROCEDURE — 97110 THERAPEUTIC EXERCISES: CPT | Mod: GP | Performed by: PHYSICAL THERAPIST

## 2019-02-06 PROCEDURE — 97140 MANUAL THERAPY 1/> REGIONS: CPT | Mod: GP | Performed by: PHYSICAL THERAPIST

## 2019-02-06 PROCEDURE — 97162 PT EVAL MOD COMPLEX 30 MIN: CPT | Mod: GP | Performed by: PHYSICAL THERAPIST

## 2019-02-06 NOTE — PROGRESS NOTES
[facility] Initial Evaluation  Subjective:  The history is provided by the patient. The history is limited by a language barrier. A  was used.   Gricelda Navarro is a 58 year old female with a other (abdominal pain) condition.  Condition occurred with:  After surgery.  Condition occurred: for unknown reasons.  This is a chronic and recurrent condition  Pt states that she has been having lower abdominal pain since having her tubes tied in 2004 in [facility]. She notes that the pain is deep inside. She has consulted with the urologist and her PCP, last seen by the PCP on 1/30/19 and was referred to PT.  She did have some blood in her urine, but when checked by the urologist, she did not have the blood with the urine testing..    Site of Pain: lower R abdomen.  Radiates to: R LB.  Pain is described as burning, aching and stabbing and is constant and reported as 8/10 (9/10 at worst).  Associated with: none. Pain is the same all the time.  Symptoms are exacerbated by walking (standing) and relieved by nothing (has tried medication but it made her drowsy and chest tightness;).  Since onset symptoms are gradually worsening.  Special tests:  CT scan.      General health as reported by patient is fair.  Pertinent medical history includes:  Depression and other (iron deficiency; liver masses).    Other surgeries include:  Other (R tubal ligation).  Current medications:  Other (iron and others listed in Epic and pt unsure what she takes, just takes several pills every day).  Current occupation is homemaker.    Employment tasks: cooking, light housework.    Barriers include:  None as reported by the patient.    Red flags:  None as reported by the patient.                        Objective:  System         Lumbar/SI Evaluation  ROM:    AROM Lumbar:   Flexion:          85%  Ext:                    33% but no pain   Side Bend:        Left:  75% no pain    Right:  75% no pain  Rotation:           Left:      Right:   Side Glide:        Left:     Right:           Lumbar Myotomes:  not assessed                  Neural Tension/Mobility:  Lumbar:  Normal                SI joint/Sacrum:    Negative findings with testing of SIJ in standing, supine, and prone positions.                                  Pelvic Dysfunction Evaluation:    Bladder/Pelvic Problems:  Pt denies any bowel or bladder issues/problems              Abdominal Wall:  Abdominal wall pelvic: no tenderness w/palpation to R upper and lower abdominal area, or midline, or suprapubic, no tenderness on iliacus either.  Diastasis Recti:  NA  Trigger Points:  NA    Pelvic Clock Exam:  NA            SI Provocation:  normal          External Assessment:  NA (no time)              Internal Assessment:  NA (no time today as pt did arrive late)              SEMG Biofeedback:  NA (not enough time)                                         General     ROS    Assessment/Plan:    Patient is a 58 year old female with pelvic and abdominal complaints.    Patient has the following significant findings with corresponding treatment plan.                Diagnosis 1:  Chronic R abdominal pain  Pain -  hot/cold therapy, self management, education and home program  Decreased strength - therapeutic exercise and therapeutic activities  Impaired muscle performance - biofeedback and neuro re-education  Decreased function - therapeutic activities  Impaired posture - neuro re-education and therapeutic activities    Therapy Evaluation Codes:   1) History comprised of:   Personal factors that impact the plan of care:      Education, Language, Past/current experiences, Social history/culture and Time since onset of symptoms.    Comorbidity factors that impact the plan of care are:      Depression.     Medications impacting care: Anti-depressant.  2) Examination of Body Systems comprised of:   Body structures and functions that impact the plan of care:      Pelvis and abdomen.   Activity  limitations that impact the plan of care are:      Cooking, Lifting, Standing and Walking.  3) Clinical presentation characteristics are:   Evolving/Changing.  4) Decision-Making    Moderate complexity using standardized patient assessment instrument and/or measureable assessment of functional outcome.  Cumulative Therapy Evaluation is: Moderate complexity.    Previous and current functional limitations:  (See Goal Flow Sheet for this information)    Short term and Long term goals: (See Goal Flow Sheet for this information)     Communication ability:  Patient has an  for communication clarity.  Treatment Explanation - The following has been discussed with the patient:   RX ordered/plan of care  Anticipated outcomes  Possible risks and side effects  This patient would benefit from PT intervention to resume normal activities.   Rehab potential is fair.    Frequency:  1 X week, once daily  Duration:  for 4 weeks  Discharge Plan:  Achieve all LTG.  Independent in home treatment program.  Reach maximal therapeutic benefit.    Please refer to the daily flowsheet for treatment today, total treatment time and time spent performing 1:1 timed codes.

## 2019-02-13 ENCOUNTER — THERAPY VISIT (OUTPATIENT)
Dept: PHYSICAL THERAPY | Facility: CLINIC | Age: 59
End: 2019-02-13
Payer: COMMERCIAL

## 2019-02-13 DIAGNOSIS — R10.84 ABDOMINAL PAIN, GENERALIZED: ICD-10-CM

## 2019-02-13 PROCEDURE — 97110 THERAPEUTIC EXERCISES: CPT | Mod: GP | Performed by: PHYSICAL THERAPIST

## 2019-02-13 PROCEDURE — 97140 MANUAL THERAPY 1/> REGIONS: CPT | Mod: GP | Performed by: PHYSICAL THERAPIST

## 2019-02-27 ENCOUNTER — THERAPY VISIT (OUTPATIENT)
Dept: PHYSICAL THERAPY | Facility: CLINIC | Age: 59
End: 2019-02-27
Payer: COMMERCIAL

## 2019-02-27 DIAGNOSIS — R10.84 ABDOMINAL PAIN, GENERALIZED: ICD-10-CM

## 2019-02-27 PROCEDURE — 97110 THERAPEUTIC EXERCISES: CPT | Mod: GP | Performed by: PHYSICAL THERAPIST

## 2019-02-27 PROCEDURE — 97140 MANUAL THERAPY 1/> REGIONS: CPT | Mod: GP | Performed by: PHYSICAL THERAPIST

## 2019-04-08 ENCOUNTER — OFFICE VISIT (OUTPATIENT)
Dept: FAMILY MEDICINE | Facility: CLINIC | Age: 59
End: 2019-04-08
Payer: COMMERCIAL

## 2019-04-08 VITALS
SYSTOLIC BLOOD PRESSURE: 120 MMHG | HEIGHT: 55 IN | TEMPERATURE: 98.3 F | BODY MASS INDEX: 19.67 KG/M2 | DIASTOLIC BLOOD PRESSURE: 68 MMHG | WEIGHT: 85 LBS | OXYGEN SATURATION: 97 % | HEART RATE: 60 BPM

## 2019-04-08 DIAGNOSIS — Z12.4 SCREENING FOR MALIGNANT NEOPLASM OF CERVIX: ICD-10-CM

## 2019-04-08 DIAGNOSIS — R10.31 RLQ ABDOMINAL PAIN: Primary | ICD-10-CM

## 2019-04-08 DIAGNOSIS — R31.0 GROSS HEMATURIA: ICD-10-CM

## 2019-04-08 DIAGNOSIS — J30.1 SEASONAL ALLERGIC RHINITIS DUE TO POLLEN: ICD-10-CM

## 2019-04-08 DIAGNOSIS — Z23 NEED FOR PROPHYLACTIC VACCINATION WITH TETANUS-DIPHTHERIA (TD): ICD-10-CM

## 2019-04-08 PROCEDURE — 99214 OFFICE O/P EST MOD 30 MIN: CPT | Performed by: FAMILY MEDICINE

## 2019-04-08 RX ORDER — HYDROCODONE BITARTRATE AND ACETAMINOPHEN 5; 325 MG/1; MG/1
1-2 TABLET ORAL
COMMUNITY
Start: 2019-04-06 | End: 2019-05-29

## 2019-04-08 RX ORDER — LORATADINE 10 MG/1
10 TABLET ORAL DAILY
Qty: 90 TABLET | Refills: 3 | Status: SHIPPED | OUTPATIENT
Start: 2019-04-08 | End: 2019-09-30

## 2019-04-08 ASSESSMENT — MIFFLIN-ST. JEOR: SCORE: 796.81

## 2019-04-08 NOTE — PROGRESS NOTES
SUBJECTIVE:                                                    Gricelda Navarro is a 58 year old female who presents to clinic today for the following health issues:      Abdominal Pain      Duration: many months    Description (location/character/radiation): right sidew       Associated flank pain: right side    Intensity:  moderate, severe    Accompanying signs and symptoms:        Fever/Chills: no        Gas/Bloating: no        Nausea/vomitting: YES       Diarrhea: no        Dysuria or Hematuria: yes, hematuria    History (previous similar pain/trauma/previous testing):     Precipitating or alleviating factors:       Pain worse with eating/BM/urination: none       Pain relieved by BM: no     Therapies tried and outcome: norco - helps without drowsiness, zofran    LMP:  not applicable    Had three session of Pelvic PT and felt wasn't helping.  Has had two ED visit and received norco at last visit which has helped      Problem list and histories reviewed & adjusted, as indicated.  Additional history: as documented    Patient Active Problem List   Diagnosis     Recurrent cold sores     Gastritis     Back pain     Major depression     Generalized anxiety disorder     Seasonal allergic rhinitis     Iron deficiency anemia     CARDIOVASCULAR SCREENING; LDL GOAL LESS THAN 160     Vitamin D deficiency     External hemorrhoids     Internal hemorrhoids     Pelvic floor dysfunction     RLQ abdominal pain     Nephrolithiasis     Left ovarian cyst     Liver masses     Abdominal pain, generalized     Past Surgical History:   Procedure Laterality Date     C/SECTION, LOW TRANSVERSE      , Low Transverse     COLONOSCOPY WITH CO2 INSUFFLATION N/A 2017    Procedure: COLONOSCOPY WITH CO2 INSUFFLATION;  colonoscopy/Dr. Destiney Cuba/Rectal mass [K62.9]  - Primary /BMi: [facility]: [phone];  Surgeon: John Aguilar MD;  Location: [facility] OR     TUBAL LIGATION         Social History     Tobacco Use     Smoking status:  "Never Smoker     Smokeless tobacco: Never Used   Substance Use Topics     Alcohol use: No     Family History   Problem Relation Age of Onset     Unknown/Adopted Mother      Unknown/Adopted Father            ROS:  Constitutional, HEENT, cardiovascular, pulmonary, gi and gu systems are negative, except as otherwise noted.    OBJECTIVE:     /68   Pulse 60   Temp 98.3  F (36.8  C) (Oral)   Ht 1.372 m (4' 6\")   Wt 38.6 kg (85 lb)   LMP 12/25/2013   SpO2 97%   BMI 20.49 kg/m    Body mass index is 20.49 kg/m .  GENERAL: healthy, alert and no distress  NECK: no adenopathy, no asymmetry, masses, or scars and thyroid normal to palpation  RESP: lungs clear to auscultation - no rales, rhonchi or wheezes  CV: regular rate and rhythm, normal S1 S2, no S3 or S4, no murmur, click or rub, no peripheral edema and peripheral pulses strong  ABDOMEN: soft, nontender, no hepatosplenomegaly, no masses and bowel sounds normal  MS: no gross musculoskeletal defects noted, no edema  PSYCH: mentation appears normal and affect flat    Diagnostic Test Results:  Results for orders placed or performed in visit on 01/23/19   MA SCREENING DIGITAL BILAT - Future  (s+30)    Narrative    EXAMINATION: Bilateral digital screening mammography with computer  aided detection.    COMPARISON: None. Baseline.    HISTORY/FAMILY HISTORY: No symptoms, routine screening.    BREAST DENSITY: Heterogeneously dense.    COMMENTS: No suspicious finding,       Impression    IMPRESSION: BI-RADS CATEGORY: 1 -  NEGATIVE.    RECOMMENDED FOLLOW-UP: Annual Mammography    Results to be sent to the patient.     I have personally reviewed the examination and initial interpretation  and I agree with the findings.    DIAMOND URBINA MD       ASSESSMENT/PLAN:     1. RLQ abdominal pain  Potential etiologies discussed - referrals to urology (per patient request) and GI (per my recommendation) given. May cautiously give norco rx in future.  - UROLOGY ADULT REFERRAL  - " GASTROENTEROLOGY ADULT REF CONSULT ONLY    2. Gross hematuria  As above  - UROLOGY ADULT REFERRAL    3. Screening for malignant neoplasm of cervix  Recommended but refused today    4. Need for prophylactic vaccination with tetanus-diphtheria (Td)  Recommended but refused    5. Seasonal allergic rhinitis due to pollen  Rx given  - loratadine (CLARITIN) 10 MG tablet; Take 1 tablet (10 mg) by mouth daily  Dispense: 90 tablet; Refill: 3    The uses and side effects, including black box warnings as appropriate, were discussed in detail.  All patient questions were answered.  The patient was instructed to call immediately if any side effects developed.     FUTURE APPOINTMENTS:       - Follow-up visit in 1 month or after specialist visits.    Marycarmen Cuab MD  [facility] [address]

## 2019-04-08 NOTE — PATIENT INSTRUCTIONS
.pilar    At [facility], we strive to deliver an exceptional experience to you, every time we see you.  If you receive a survey in the mail, please send us back your thoughts. We really do value your feedback.    Your care team:                            Family Medicine Internal Medicine   MD Skinny Evans MD Shantel Branch-Fleming, MD Katya Georgiev PA-C Megan Hill, APRJEN Lo, MD Pediatrics   Itz Morales, MARSHA Bradshaw, MD Cathy Nicholas APRN CNP   MD Simran Bowie MD Deborah Mielke, MD Dinorah Lundberg, APRN CNP      Clinic hours: Monday - Thursday 7 am-7 pm; Fridays 7 am-5 pm.   Urgent care: Monday - Friday 11 am-9 pm; Saturday and Sunday 9 am-5 pm.  Pharmacy : Monday -Thursday 8 am-8 pm; Friday 8 am-6 pm; Saturday and Sunday 9 am-5 pm.     Clinic: [phone]   Pharmacy: [phone]          ================================================================================  Normal Values   Blood pressure  <140/90 for most adults    <130/80 for some chronic diseases (ask your care team about yours)    BMI (body mass index)  18.5-25 kg/m2 (based on height and weight)     Thank you for visiting [facility]    Normal or non-critical lab and imaging results will be communicated to you by MyChart, letter or phone within 7 days.  If you do not hear from us within 10 days, please call the clinic. If you have a critical or abnormal lab result, we will notify you by phone as soon as possible.     If you have any questions regarding your visit please contact:     Team Comfort:   Clinic Hours Telephone Number   Dr. Adriano Neville 7am-5pm  Monday - Friday [phone]  Myra RN  Yulia Carrasco RN   Pharmacy 8:00am-8pm Monday-Friday    9am-5pm Saturday-Sunday [phone]   Urgent Care 11am-9pm Monday-Friday        9am-5pm Saturday-Sunday  [phone]     After hours, weekend or if you need to make an appointment with your primary provider please call [phone].   After Hours nurse advise: call [facility]: [phone]    Medication Refills:  Call your pharmacy and they will forward the refill to us. Please allow 3 business days for your refills to be completed.

## 2019-04-09 ENCOUNTER — APPOINTMENT (OUTPATIENT)
Dept: OPTOMETRY | Facility: CLINIC | Age: 59
End: 2019-04-09
Payer: COMMERCIAL

## 2019-04-09 ENCOUNTER — OFFICE VISIT (OUTPATIENT)
Dept: OPTOMETRY | Facility: CLINIC | Age: 59
End: 2019-04-09
Payer: COMMERCIAL

## 2019-04-09 DIAGNOSIS — H04.123 DRY EYE SYNDROME OF BOTH EYES: ICD-10-CM

## 2019-04-09 DIAGNOSIS — Z01.00 EXAMINATION OF EYES AND VISION: Primary | ICD-10-CM

## 2019-04-09 DIAGNOSIS — H52.03 HYPEROPIA, BILATERAL: ICD-10-CM

## 2019-04-09 DIAGNOSIS — H52.4 PRESBYOPIA: ICD-10-CM

## 2019-04-09 PROCEDURE — 92341 FIT SPECTACLES BIFOCAL: CPT | Performed by: OPTOMETRIST

## 2019-04-09 PROCEDURE — 92004 COMPRE OPH EXAM NEW PT 1/>: CPT | Performed by: OPTOMETRIST

## 2019-04-09 PROCEDURE — 92015 DETERMINE REFRACTIVE STATE: CPT | Performed by: OPTOMETRIST

## 2019-04-09 ASSESSMENT — REFRACTION_WEARINGRX
SPECS_TYPE: BIFOCAL
OD_CYLINDER: SPHERE
OD_SPHERE: +1.25
OD_ADD: +2.50
OS_ADD: +2.50
OS_CYLINDER: SPHERE
OS_SPHERE: +1.00

## 2019-04-09 ASSESSMENT — VISUAL ACUITY
OD_SC+: -2
OS_CC+: -3
OS_CC: 20/40
OS_SC: 20/60
OD_CC: 20/20
OS_CC: 20/25
OD_SC: 20/40
OD_CC: 20/40
METHOD: SNELLEN - LINEAR
CORRECTION_TYPE: GLASSES
OD_CC+: -3

## 2019-04-09 ASSESSMENT — CONF VISUAL FIELD
OS_NORMAL: 1
OD_NORMAL: 1

## 2019-04-09 ASSESSMENT — REFRACTION_MANIFEST
OD_CYLINDER: +0.25
OD_AXIS: 103
OS_SPHERE: +1.25
OD_SPHERE: +1.25
OD_ADD: +2.25
OS_ADD: +2.25

## 2019-04-09 ASSESSMENT — TONOMETRY
IOP_METHOD: TONOPEN
OS_IOP_MMHG: 17
OD_IOP_MMHG: 10

## 2019-04-09 ASSESSMENT — SLIT LAMP EXAM - LIDS
COMMENTS: NORMAL
COMMENTS: NORMAL

## 2019-04-09 ASSESSMENT — CUP TO DISC RATIO
OD_RATIO: 0.15
OS_RATIO: 0.15

## 2019-04-09 ASSESSMENT — EXTERNAL EXAM - RIGHT EYE: OD_EXAM: NORMAL

## 2019-04-09 ASSESSMENT — EXTERNAL EXAM - LEFT EYE: OS_EXAM: NORMAL

## 2019-04-09 NOTE — PROGRESS NOTES
Chief Complaint   Patient presents with     Annual Eye Exam      Accompanied by   Last Eye Exam: 2014  Dilated Previously: No, side effects of dilation explained today,info given to     What are you currently using to see?  glasses       Distance Vision Acuity: Satisfied with vision    Near Vision Acuity: Not satisfied,pain in eyes when wears glasses     Eye Comfort: watery  Do you use eye drops? : No  Occupation or Hobbies: none    Judi Treviño Optometric Assistant, A.B.O.C.          Medical, surgical and family histories reviewed and updated 4/9/2019.       OBJECTIVE: See Ophthalmology exam    ASSESSMENT:    ICD-10-CM    1. Examination of eyes and vision Z01.00 EYE EXAM (SIMPLE-NONBILLABLE)   2. Hyperopia, bilateral H52.03 REFRACTION   3. Presbyopia H52.4 REFRACTION   4. Dry eye syndrome of both eyes H04.123 EYE EXAM (SIMPLE-NONBILLABLE)      PLAN:     Patient Instructions   Recommend new glasses.    Use one drop of artificial tears both eyes 3-4 x daily.  Continue to use the drops regardless if your eyes are comfortable.  Artificial tears work best as a preventative and not as well after your eyes are starting to bother you.  It may take 4- 6 weeks of using the drops before you notice improvement.  If after that time you are still having problems schedule an appointment for an evaluation and discussion of different treatments.  Dry eyes are a chronic condition and you may have more symptoms at certain times of the year.    Return in 1 year for a complete eye exam or sooner if needed.    Santos Francois, OD

## 2019-04-09 NOTE — PATIENT INSTRUCTIONS
Recommend new glasses.    Use one drop of artificial tears both eyes 3-4 x daily.  Continue to use the drops regardless if your eyes are comfortable.  Artificial tears work best as a preventative and not as well after your eyes are starting to bother you.  It may take 4- 6 weeks of using the drops before you notice improvement.  If after that time you are still having problems schedule an appointment for an evaluation and discussion of different treatments.  Dry eyes are a chronic condition and you may have more symptoms at certain times of the year.    Return in 1 year for a complete eye exam or sooner if needed.    Santos Francois, OD    The affects of the dilating drops last for 4- 6 hours.  You will be more sensitive to light and vision will be blurry up close.  Mydriatic sunglasses were given if needed.      Optometry Providers       Clinic Locations                                 Telephone Number   Dr. Lisa Xie   [place]    [place]   [place] [address] [place]   [place] [phone]     [facility] Hours:                [place] [facility] Hours:       [facility] Hours:   [address]   [address] [address]     [address] [place] [address]   [place] [address]    [place] [address]  Phone: [phone]                    Phone: [phone]     Phone: [phone]                      Monday 8:00-7:00                          Monday 8:00-7:00                          Monday 8:00-7:00              Tuesday 8:00-6:00                          Tuesday 8:00-7:00                          Tuesday 8:00-7:00              Wednesday 8:00-6:00                  Wednesday 8:00-7:00                   Wednesday 8:00-7:00      Thursday 8:00-6:00                        Thursday 8:00-7:00                         Thursday 8:00-7:00            Friday 8:00-5:00                              Friday 8:00-5:00                               Friday 8:00-5:00    [facility] Hours:   [address] [place] [address]  [phone]    Monday 8:00-7:00  Tuesday 8:00-7:00  Wednesday 8:00-7:00  Thursday 8:00-7:00  Friday 8:00-5:00  Please log on to [facility] to order your contact lenses.  The link is found on the [facility] page.  As always, Thank you for trusting us with your health care needs!

## 2019-04-09 NOTE — LETTER
4/9/2019         RE: Gricelda Navarro  [address] [place] [facility] [place] [address]        Dear Colleague,    Thank you for referring your patient, Gricelda Navarro, to the [facility]. Please see a copy of my visit note below.    Chief Complaint   Patient presents with     Annual Eye Exam      Accompanied by   Last Eye Exam: 2014  Dilated Previously: No, side effects of dilation explained today,info given to     What are you currently using to see?  glasses       Distance Vision Acuity: Satisfied with vision    Near Vision Acuity: Not satisfied,pain in eyes when wears glasses     Eye Comfort: watery  Do you use eye drops? : No  Occupation or Hobbies: none    Judi Treviño Optometric Assistant, A.B.O.C.          Medical, surgical and family histories reviewed and updated 4/9/2019.       OBJECTIVE: See Ophthalmology exam    ASSESSMENT:    ICD-10-CM    1. Examination of eyes and vision Z01.00 EYE EXAM (SIMPLE-NONBILLABLE)   2. Hyperopia, bilateral H52.03 REFRACTION   3. Presbyopia H52.4 REFRACTION   4. Dry eye syndrome of both eyes H04.123 EYE EXAM (SIMPLE-NONBILLABLE)      PLAN:     Patient Instructions   Recommend new glasses.    Use one drop of artificial tears both eyes 3-4 x daily.  Continue to use the drops regardless if your eyes are comfortable.  Artificial tears work best as a preventative and not as well after your eyes are starting to bother you.  It may take 4- 6 weeks of using the drops before you notice improvement.  If after that time you are still having problems schedule an appointment for an evaluation and discussion of different treatments.  Dry eyes are a chronic condition and you may have more symptoms at certain times of the year.    Return in 1 year for a complete eye exam or sooner if needed.    Santos Francois, ZACHARY           Again, thank you for allowing me to participate in the care of your patient.        Sincerely,        Santos Francois, OD

## 2019-04-16 ENCOUNTER — OFFICE VISIT (OUTPATIENT)
Dept: UROLOGY | Facility: CLINIC | Age: 59
End: 2019-04-16
Payer: COMMERCIAL

## 2019-04-16 VITALS — HEART RATE: 68 BPM | SYSTOLIC BLOOD PRESSURE: 102 MMHG | DIASTOLIC BLOOD PRESSURE: 76 MMHG | RESPIRATION RATE: 14 BRPM

## 2019-04-16 DIAGNOSIS — R10.31 RLQ ABDOMINAL PAIN: Primary | ICD-10-CM

## 2019-04-16 DIAGNOSIS — R31.0 GROSS HEMATURIA: ICD-10-CM

## 2019-04-16 PROCEDURE — 99204 OFFICE O/P NEW MOD 45 MIN: CPT | Performed by: UROLOGY

## 2019-04-16 NOTE — PROGRESS NOTES
S: Patient is a 58-year-old non-English speaking [facility] female who is request to be seen by Dr. Destiney Cuba for a consultation with regard to patient's chronic right lower groin pain.  Her pain has been gone for about  1 year.  It is a dull achy pain.  It can be constant.  It is worse with activities and better with rest.  She denies any bowel problems.  She has no fever, nausea coming or vomiting.  She has no urinary problems except for 1-2 episodes where she noted some blood in the urine.  She has had multiple imaging studies done for this.  There are some small renal stones found otherwise unremarkable.  She does also have a complex left ovarian cyst.  She is status post hysterectomy.  Information obtained through [facility] .  Her daughter is also with her today.  Current Outpatient Medications   Medication Sig Dispense Refill     carboxymethylcellulose (REFRESH) 1 % ophthalmic solution Place 1 drop into both eyes 4 times daily 15 each 11     Cholecalciferol (VITAMIN D) 2000 units tablet TAKE 1 TABLET BY MOUTH ONCE DAILY // IB HNUB NOJ IB  tablet 3     diclofenac (VOLTAREN) 1 % GEL Apply 2 grams to affected areas up to four times daily using enclosed dosing card. 100 g 1     ferrous gluconate (FERGON) 324 (38 Fe) MG tablet TAKE 1 TABLET BY MOUTH TWICE DAILY // IB ZAUG NOJ IB LUB, IB HNUB NOJ OB ZAUG PAB JAHAIRA NTSHAV LIAB 90 tablet 1     gabapentin (NEURONTIN) 100 MG capsule Take 1 capsule (100 mg) by mouth 3 times daily 90 capsule 0     HYDROcodone-acetaminophen (NORCO) 5-325 MG tablet Take 1-2 tablets by mouth       hydrocortisone (ANUSOL-HC) 2.5 % cream Place rectally 2 times daily 30 g 3     loratadine (CLARITIN) 10 MG tablet Take 1 tablet (10 mg) by mouth daily 90 tablet 3     ranitidine (ZANTAC) 300 MG tablet TAKE 1 TABLET BY MOUTH AT BEDTIME DAILY FOR STOMACH//IB HNUB NOJ 1 LUB THAUM MUS PW PAB JAHAIRA MOB PLAB (NCAUJ PLAB) 90 tablet 1     Allergies   Allergen Reactions     Prednisone Hives      Cortisone Itching, Swelling and Rash     Cyclobenzaprine Rash     Past Medical History:   Diagnosis Date     Back pain      KELSIE (generalised anxiety disorder)      Gastritis      Iron deficiency anemia      Major depression      Recurrent cold sores      Seasonal allergic rhinitis      Past Surgical History:   Procedure Laterality Date     C/SECTION, LOW TRANSVERSE      , Low Transverse     COLONOSCOPY WITH CO2 INSUFFLATION N/A 2017    Procedure: COLONOSCOPY WITH CO2 INSUFFLATION;  colonoscopy/Dr. Destiney Cuba/Rectal mass [K62.9]  - [facility]: [facility]: [phone];  Surgeon: John Aguilar MD;  Location: [facility] OR     TUBAL LIGATION        Family History   Problem Relation Age of Onset     Unknown/Adopted Mother      Unknown/Adopted Father      Social History     Socioeconomic History     Marital status:      Spouse name: Gricelda     Number of children: 6     Years of education: None     Highest education level: None   Occupational History     None   Social Needs     Financial resource strain: None     Food insecurity:     Worry: None     Inability: None     Transportation needs:     Medical: None     Non-medical: None   Tobacco Use     Smoking status: Never Smoker     Smokeless tobacco: Never Used   Substance and Sexual Activity     Alcohol use: No     Drug use: No     Sexual activity: Yes     Partners: Male     Birth control/protection: Female Surgical, Post-menopausal   Lifestyle     Physical activity:     Days per week: None     Minutes per session: None     Stress: None   Relationships     Social connections:     Talks on phone: None     Gets together: None     Attends Hinduism service: None     Active member of club or organization: None     Attends meetings of clubs or organizations: None     Relationship status: None     Intimate partner violence:     Fear of current or ex partner: None     Emotionally abused: None     Physically abused: None     Forced sexual activity: None    Other Topics Concern     Parent/sibling w/ CABG, MI or angioplasty before 65F 55M? Not Asked   Social History Narrative     None       REVIEW OF SYSTEMS  =================  C: NEGATIVE for fever, chills, change in weight  I: NEGATIVE for worrisome rashes, moles or lesions  E/M: NEGATIVE for ear, mouth and throat problems  R: NEGATIVE for significant cough or SHORTNESS OF BREATH  CV:  NEGATIVE for chest pain, palpitations or peripheral edema  GI: NEGATIVE for nausea, abdominal pain, heartburn, or change in bowel habits  NEURO: NEGATIVE numbness/weakness  : see HPI  PSYCH: NEGATIVE depression/anxiety  LYmph: no new enlarged lymph nodes  Ortho: no new trauma/movements      Physical Exam:  /76 (BP Location: Right arm, Patient Position: Chair, Cuff Size: Adult Regular)   Pulse 68   Resp 14   LMP 12/25/2013    Patient is pleasant, in no acute distress, good general condition.  Heart:  negative, PMI normal  Lung: no evidence of respiratory distress    Abdomen: Soft, nondistended, non tender. No masses. No rebound or guarding.   Exam: No CVA tenderness.  Skin: Warm and dry.  No redness.  Neuro: grossly normal  Musculaskeletal: moving all extremities  Psych normal mood and affect  Musculoskeletal  moving all extremities  Hematologic/Lymphatic/Immunologic: normal ant/post cervical, axillary, supraclavicular and inguinal nodes    Assessment/Plan:   58-year-old [place] female with right groin pain with history of gross hematuria.  Symptoms are very suggestive of pelvic muscle issue.  There are no obvious urological condition that can explain her groin discomfort.  However, giving a history of gross hematuria we will schedule patient for a cystoscopy next for further evaluation.

## 2019-04-22 ENCOUNTER — OFFICE VISIT (OUTPATIENT)
Dept: GASTROENTEROLOGY | Facility: CLINIC | Age: 59
End: 2019-04-22
Payer: COMMERCIAL

## 2019-04-22 VITALS
BODY MASS INDEX: 19.46 KG/M2 | OXYGEN SATURATION: 100 % | HEART RATE: 62 BPM | WEIGHT: 84.1 LBS | DIASTOLIC BLOOD PRESSURE: 65 MMHG | SYSTOLIC BLOOD PRESSURE: 106 MMHG | HEIGHT: 55 IN

## 2019-04-22 DIAGNOSIS — R10.31 RLQ ABDOMINAL PAIN: Primary | ICD-10-CM

## 2019-04-22 DIAGNOSIS — K59.00 CONSTIPATION, UNSPECIFIED CONSTIPATION TYPE: ICD-10-CM

## 2019-04-22 DIAGNOSIS — D18.03 LIVER HEMANGIOMA: ICD-10-CM

## 2019-04-22 DIAGNOSIS — N83.8 OVARIAN MASS, LEFT: ICD-10-CM

## 2019-04-22 PROCEDURE — 99204 OFFICE O/P NEW MOD 45 MIN: CPT | Performed by: PHYSICIAN ASSISTANT

## 2019-04-22 RX ORDER — POLYETHYLENE GLYCOL 3350 17 G/17G
1 POWDER, FOR SOLUTION ORAL DAILY
Qty: 30 PACKET | Refills: 1 | Status: SHIPPED | OUTPATIENT
Start: 2019-04-22 | End: 2019-09-30

## 2019-04-22 ASSESSMENT — ENCOUNTER SYMPTOMS
NERVOUS/ANXIOUS: 0
DIFFICULTY URINATING: 0
CHILLS: 0
VOMITING: 0
UNEXPECTED WEIGHT CHANGE: 0
CHOKING: 0
FEVER: 0
NAUSEA: 0
CHEST TIGHTNESS: 0
BACK PAIN: 1
SHORTNESS OF BREATH: 0
BLOOD IN STOOL: 0
TROUBLE SWALLOWING: 0
ABDOMINAL PAIN: 1
SPEECH DIFFICULTY: 0
WEAKNESS: 0

## 2019-04-22 ASSESSMENT — PAIN SCALES - GENERAL: PAINLEVEL: SEVERE PAIN (7)

## 2019-04-22 ASSESSMENT — MIFFLIN-ST. JEOR: SCORE: 792.73

## 2019-04-22 NOTE — NURSING NOTE
"Gricelda Navarro's goals for this visit include:   Chief Complaint   Patient presents with     Consult     RLQ abdominal pain since January, 2019       She requests these members of her care team be copied on today's visit information: Yes    PCP: Marycarmen Taylor    Referring Provider:  No referring provider defined for this encounter.    /65 (BP Location: Left arm, Patient Position: Sitting, Cuff Size: Adult Small)   Pulse 62   Ht 1.372 m (4' 6\")   Wt 38.1 kg (84 lb 1.6 oz)   LMP 12/25/2013   SpO2 100%   BMI 20.28 kg/m      Do you need any medication refills at today's visit? No    Ana Bernard LPN      "

## 2019-04-22 NOTE — PROGRESS NOTES
GASTROENTEROLOGY NEW PATIENT CLINIC VISIT    CC/REFERRING MD:    Marycarmen Taylor      REASON FOR CONSULTATION:   Referred by PCP for Consult (RLQ abdominal pain since January, 2019)        HISTORY OF PRESENT ILLNESS:    Gricelda Navarro is 58 year old female who presents for evaluation of a chronic RLQ abdominal pain that has recently exacerbated since December 2018/January 2019.     She presents today with a [facility]  and is also accompanied by her daughter.     Gricelda has actually noticed this pain since 2006/2007 after a tubal ligation.  Initially this pain was intermittent and would come and go.  In the last 4-5 months pain has become more constant.  In December 2018 she noticed hematuria along with worsening abdominal pain. She went to the ER on 1/3/19 and was evaluated with labs and imaging.  A CT of the abdomen and pelvis revealed multiple hepatic masses measuring up to 6 cm diameter.  It is recommended to correlate these findings with a multiplies MR or CT examination.  She was noted to have a complex cystic appearance of the left ovary and 2 tiny nonobstructing right renal stones.  There was no evidence of ureteral stone or urinary tract obstruction. Subtle ovoid lucent areas also noted to lower spine and iliac bones. Given some of these concerning findings she was further evaluated with a pelvic sonogram while in the ER. Pelvic sono revealed cluster of cysts versus complex septated cystic lesion in the left ovary. It was recommended that she follow up with an OB-gyn as a low grade neoplasm could not be ruled out.     She was evaluated by an Ob gyn on 1/9/19 at [facility]. Her  was tested and was told it was wnl. She was advised by Ob-gyn to have a left salpingo-oopherectomy if Ca 125 is wnl.     She was also evaluated with an MR Abdomen w & w/o contrast for her abnormal CT findings  (liver masses). No acute findings were noted in the abdomen. Multiple hepatic hemangiomas were  found with the largest measuring 6.5 cm. She was advised to have a repeat MRI in 6 months.     She describes this RLQ abd pain as a sharp and cutting feeling. The pain is worse with certain movements such as reaching for something high up or with activities of daily living. The pain at times radiates to her right lower back. She has tried a heating pad but this did not offer any relief. She denies any nausea or vomiting. She denies any weight loss. She finds that cold foods tend to cause more discomfort. She prefers to eat warmer foods. She has a BM about every one to two days and admits to straining. She however does not feel that she is constipated. Having a BM does not improve the pain. Neither does urinating. She denies any rectal bleeding or black tarry stools. She denies any family hx of digestive diseases. She denies family hx of colon cancer. She was previously evaluated with a colonoscopy in November 2017 revealed a palpable rectal mass on BRANDEE, internal hemorrhoids, normal ileum and otherwise normal colonoscopy.     She has been referred to physical therapy as pain was thought to be more related to musculoskeletal/pelvic floor dysfunction. She has completed 3 sessions of physical therapy with no change in her symptoms.     She was also evaluated by urology for her hematuria and noted right kidney stones. It was thought that the renal stones are not causing her pain however she is scheduled for a cystoscopy next week for further evaluation.           PREVIOUS ENDOSCOPY:    Colonoscopy 11/20/2017  Impression:         - Palpable rectal mass found on digital rectal exam.                             - The examined portion of the ileum was normal.                             - Internal hemorrhoids.                             - The examination was otherwise normal.                             - No specimens collected.     John Aguilar MD         PROBLEM LIST  Patient Active Problem List    Diagnosis Date Noted      Abdominal pain, generalized 2019     Priority: Medium     Nephrolithiasis 2019     Priority: Medium     Left ovarian cyst 2019     Priority: Medium     Liver masses 2019     Priority: Medium     Pelvic floor dysfunction 2018     Priority: Medium     RLQ abdominal pain 2018     Priority: Medium     Internal hemorrhoids 2017     Priority: Medium     External hemorrhoids 2017     Priority: Medium     Vitamin D deficiency 2016     Priority: Medium     CARDIOVASCULAR SCREENING; LDL GOAL LESS THAN 160 2014     Priority: Medium     Recurrent cold sores      Priority: Medium     Gastritis      Priority: Medium     Back pain      Priority: Medium     Major depression      Priority: Medium     Generalized anxiety disorder      Priority: Medium     Diagnosis updated by automated process. Provider to review and confirm.       Seasonal allergic rhinitis      Priority: Medium     Iron deficiency anemia      Priority: Medium       PERTINENT PAST MEDICAL HISTORY:  (I personally reviewed this history with the patient at today's visit)   Past Medical History:   Diagnosis Date     Back pain      KELSIE (generalised anxiety disorder)      Gastritis      Iron deficiency anemia      Major depression      Recurrent cold sores      Seasonal allergic rhinitis          PREVIOUS SURGERIES: (I personally reviewed this history with the patient at today's visit)   Past Surgical History:   Procedure Laterality Date     C/SECTION, LOW TRANSVERSE      , Low Transverse     COLONOSCOPY WITH CO2 INSUFFLATION N/A 2017    Procedure: COLONOSCOPY WITH CO2 INSUFFLATION;  colonoscopy/Dr. Destiney Cuba/Rectal mass [K62.9]  - Primary /BMi: [facility]: [phone];  Surgeon: John Aguilar MD;  Location: [facility] OR     TUBAL LIGATION           ALLERGIES:     Allergies   Allergen Reactions     Prednisone Hives     Cortisone Itching, Swelling and Rash     Cyclobenzaprine Rash        PERTINENT MEDICATIONS:    Current Outpatient Medications:      carboxymethylcellulose (REFRESH) 1 % ophthalmic solution, Place 1 drop into both eyes 4 times daily, Disp: 15 each, Rfl: 11     Cholecalciferol (VITAMIN D) 2000 units tablet, TAKE 1 TABLET BY MOUTH ONCE DAILY // IB HNUB NOJ IB LUB, Disp: 100 tablet, Rfl: 3     ferrous gluconate (FERGON) 324 (38 Fe) MG tablet, TAKE 1 TABLET BY MOUTH TWICE DAILY // IB ZAUG NOJ IB LUB, IB HNUB NOJ OB ZAUG PAB JAHAIRA NTSHAV LIAB, Disp: 90 tablet, Rfl: 1     gabapentin (NEURONTIN) 100 MG capsule, Take 1 capsule (100 mg) by mouth 3 times daily, Disp: 90 capsule, Rfl: 0     hydrocortisone (ANUSOL-HC) 2.5 % cream, Place rectally 2 times daily, Disp: 30 g, Rfl: 3     diclofenac (VOLTAREN) 1 % GEL, Apply 2 grams to affected areas up to four times daily using enclosed dosing card. (Patient not taking: Reported on 4/22/2019), Disp: 100 g, Rfl: 1     HYDROcodone-acetaminophen (NORCO) 5-325 MG tablet, Take 1-2 tablets by mouth, Disp: , Rfl:      loratadine (CLARITIN) 10 MG tablet, Take 1 tablet (10 mg) by mouth daily (Patient not taking: Reported on 4/22/2019), Disp: 90 tablet, Rfl: 3     ranitidine (ZANTAC) 300 MG tablet, TAKE 1 TABLET BY MOUTH AT BEDTIME DAILY FOR STOMACH//IB HNUB NOJ 1 LUB THAUM MUS PW PAB JAHAIRA MOB PLAB (NCAUJ PLAB) (Patient not taking: Reported on 4/22/2019), Disp: 90 tablet, Rfl: 1    SOCIAL HISTORY:  Social History     Socioeconomic History     Marital status:      Spouse name: Gricelda     Number of children: 6     Years of education: Not on file     Highest education level: Not on file   Occupational History     Not on file   Social Needs     Financial resource strain: Not on file     Food insecurity:     Worry: Not on file     Inability: Not on file     Transportation needs:     Medical: Not on file     Non-medical: Not on file   Tobacco Use     Smoking status: Never Smoker     Smokeless tobacco: Never Used   Substance and Sexual Activity     Alcohol  use: No     Drug use: No     Sexual activity: Yes     Partners: Male     Birth control/protection: Female Surgical, Post-menopausal   Lifestyle     Physical activity:     Days per week: Not on file     Minutes per session: Not on file     Stress: Not on file   Relationships     Social connections:     Talks on phone: Not on file     Gets together: Not on file     Attends Latter day service: Not on file     Active member of club or organization: Not on file     Attends meetings of clubs or organizations: Not on file     Relationship status: Not on file     Intimate partner violence:     Fear of current or ex partner: Not on file     Emotionally abused: Not on file     Physically abused: Not on file     Forced sexual activity: Not on file   Other Topics Concern     Parent/sibling w/ CABG, MI or angioplasty before 65F 55M? Not Asked   Social History Narrative     Not on file       FAMILY HISTORY: (I personally reviewed this history with the patient at today's visit)  Family History   Problem Relation Age of Onset     Unknown/Adopted Mother      Unknown/Adopted Father           Review of Systems   Constitutional: Negative for chills, fever and unexpected weight change.   HENT: Negative for trouble swallowing.    Respiratory: Negative for choking, chest tightness and shortness of breath.    Cardiovascular: Negative for chest pain and leg swelling.   Gastrointestinal: Positive for abdominal pain. Negative for blood in stool, nausea and vomiting.   Genitourinary: Negative for difficulty urinating.   Musculoskeletal: Positive for back pain.   Skin: Negative for pallor and rash.   Neurological: Negative for speech difficulty and weakness.   Psychiatric/Behavioral: The patient is not nervous/anxious.        PHYSICAL EXAMINATION:  Constitutional: aaox3, cooperative, pleasant, not dyspneic/diaphoretic, no acute distress  Vitals reviewed: /65 (BP Location: Left arm, Patient Position: Sitting, Cuff Size: Adult Small)    "Pulse 62   Ht 1.372 m (4' 6\")   Wt 38.1 kg (84 lb 1.6 oz)   LMP 12/25/2013   SpO2 100%   BMI 20.28 kg/m     Wt:   Wt Readings from Last 2 Encounters:   04/22/19 38.1 kg (84 lb 1.6 oz)   04/08/19 38.6 kg (85 lb)        Eyes: Sclera anicteric/injected  Ears/nose/mouth/throat: Normal oropharynx without ulcers or exudate, mucus membranes moist, hearing intact  Neck: supple, thyroid normal size  CV: No edema, RRR  Respiratory: Unlabored breathing, CTAB  Lymph: No submandibular, supraclavicular or inguinal lymphadenopathy  Abd: Nondistended, no masses, +bs, no hepatosplenomegaly, nontender, no peritoneal signs  Skin: warm, perfused, no jaundice  Psych: Normal affect  MSK: Normal gait      PERTINENT STUDIES: (I personally reviewed these laboratory studies today)  Most recent CBC: 4/6/19  WBC 4.3 - 10.8 K/uL 4.8    RBC 4.20 - 5.40 M/uL 4.34    HEMOGLOBIN 12.0 - 16.0 gm/dL 12.4    HEMATOCRIT 36.0 - 48.0 % 38.2    MCV 80 - 100 fl 88    MCH 27 - 33 pg 29    MCHC 33 - 36 gm/dL 33    RDW 11.5 - 14.5 % 12.9    PLATELET COUNT 150 - 400 K/UL 148Low     MPV 6.5 - 12.0  11.1    PMN %  % 60.6    IG% <=1.0 % 0.2    LYMPH %  % 29.2    MONO %  % 8.1    EOS %  % 1.7    BASO %  % 0.2    PMN ABSOLUTE 1.80 - 7.80 K/uL 2.91    IG ABSOLUTE  K/uL 0.01    LYMPH ABSOLUTE 1.00 - 4.00 K/uL 1.40    MONO ABSOLUTE 0.00 - 1.00 K/uL 0.39    EOS ABSOLUTE 0.00 - 0.45 K/uL 0.08    BASO ABSOLUTE 0.00 - 0.20 K/uL 0.01    NUCL RBC % 0.0 - 0.0 /100 WBC 0.0    NUCL RBC ABSOLUTE  K/uL 0.00            Most recent hepatic panel:4/6/19   Ref Range & Units Value   ALT 12 - 68 IU/L 26    ALKALINE P'TASE 45 - 117 IU/L 108    AST (SGOT) 12 - 37 IU/L 21    PROTEIN TOTAL 6.4 - 8.2 g/dL 7.2    ALBUMIN 3.4 - 5.0 g/dL 3.4    BILIRUBIN-DIRECT 0.05 - 0.24 mg/dL 0.09    BILIRUBIN-TOTAL 0.2 - 1.0 mg/dL 0.2        Most recent creatinine: 4/6/19   CREATININE 0.55 - 1.02 mg/dL 0.60            Lipase: 4/6/19   Ref Range & Units Value   LIPASE 73 - 350 IU/L 101  "         RADIOLOGY:    MRI ABDOMEN 1/23/19     CLINICAL HISTORY:  Abd pain, unspecified; RLQ abdominal pain; Liver  masses     TECHNIQUE:  Images were acquired with and without intravenous contrast  through the abdomen. The following MR images were acquired: TrueFISP,  multiplanar T2 weighted, axial T1 in/out of phase, axial fat-saturated  T1, diffusion-weighted. Multiplanar T1-weighted images with fat  saturation were before contrast administration and at multiple time  points following the administration of intravenous contrast. Contrast  dose: 8ml Eovist     FINDINGS:     Comparison study: None available     Patient motion degrades image quality, particularly on the  postcontrast images.     Liver: Liver surface is smooth and there is no evidence of hepatic  steatosis or iron deposition. There are multiple T2 hyperintense liver  lesions which demonstrate peripheral nodular enhancement on the  arterial phase, and gradual central filling on delayed phase images,  for example a 6.5 cm lesion in segments 2/3 (series 19 image 20), a  3.0 cm lesion in segment 7 (series 19 image 25), and a 1.3 cm lesion  in segment 6 (series 19 image 16).     Remainder of the abdomen: Spleen is normal in size. Subcentimeter cyst  in the right mid/lower renal pole. Kidneys otherwise normal. The  pancreas, adrenal glands and gallbladder are normal. Small and large  bowel are nondistended. No free fluid or bulky lymphadenopathy. Patent  major vasculature. No abdominal aortic aneurysm.     Lung bases: Within normal limits.     Bones: No suspicious bone lesion. Probable 2.0 cm hemangioma in the  T12 vertebral body (series 5 image 44).                                                                      IMPRESSION:  1. No acute finding in the abdomen.  2. Multiple hepatic hemangiomas, the largest measuring 6.5 cm in  segments 2/3.     I have personally reviewed the examination and initial interpretation  and I agree with the findings.     ALYCE  GEOFFREY RAMIREZ MD        CT SCAN OF THE ABDOMEN AND PELVIS WITHOUT INTRAVENOUS CONTRAST 4/6/2019   CLINICAL INFORMATION: Flank pain, recurrent stone disease suspected    TECHNIQUE: Preliminary thin-section axial unenhanced images were obtained through the abdomen and pelvis. Thin section axial images were obtained through the abdomen and pelvis. Multiplanar reformatted images were obtained.    COMPARISON:  [facility] CT scan dated  1/3/2019.    FINDINGS:.  LIVER: Multiple hepatic masses have not changed significantly in size or morphology as compared to the previous exam.    BILIARY TREE: Unremarkable.    PANCREAS: No abnormalities.    SPLEEN: The spleen demonstrates a normal appearance.    ADRENALS: Unremarkable in appearance.    GENITOURINARY TRACT: Tiny bilobed calculus in the right kidney again demonstrated, is now located in the middle pole calyx. No other urinary tract calculi demonstrated. No hydronephrosis.    REPRODUCTIVE ORGANS: Complex left adnexal mass has not changed significantly in appearance as compared to the previous exam.    BOWEL: No bowel obstruction or bowel inflammation. Normal appendix.    MESENTERY: No mesenteric abnormality demonstrated.    PERITONEAL CAVITY: No free intraperitoneal air or fluid.    VASCULAR STRUCTURES: No vascular abnormalities demonstrated.    ANTERIOR ABDOMINAL WALL: Intact without abnormality.    BONES AND SOFT TISSUES: No evidence of fracture, dislocation, or significant soft tissue injury.    LUNG BASES: Lower lungs are clear. No pleural fluid.    OTHER FINDINGS: No other significant findings demonstrated.    IMPRESSION  IMPRESSION:  1.  Tiny nonobstructing right renal calculi. No evidence of ureteral calculi or hydronephrosis.  2.  Complex left adnexal mass, similar to the previous exam.  3.  Multiple hepatic masses, also similar to the previous exam.  4.  There is been no significant change as compared to the exam of 1/3/2019.    REPORT SIGNED BY  Derek Blanchard  M.D.        EXAM: PELVIC ULTRASOUND :  1/3/2019.    COMPARISON: CT abdomen and pelvis without contrast from today..    CLINICAL DATA: Pain, persistent concern for malignancy on CT.    TECHNIQUE:  Transabdominal and endovaginal images were obtained. Endovaginal scanning was performed to allow for more detailed evaluation of the ovaries and document blood flow to the ovaries. Color and spectral Doppler interrogation of both ovaries performed.    FINDINGS:    UTERUS:   The uterus measures 7.06 X 4.15 X 3.12 cm.  The endometrium measures 2.4 cm in thickness, which is within normal limits.    RIGHT OVARY:  Obscured by overlying bowel gas.    LEFT OVARY:  There are multiple cysts that are either adjacent to each other or possibly within a single lesion with internal septations. It is difficult to determine whether this is a single lesion or a septated cystic mass. The dominant cystic component measures 3.4 x 3.1 x 2.3 cm. There is a other smaller cystic component that measures 1.6 cm in diameter. There is a third cystic lesion measuring at 0.7 cm with some internal debris. There is a focus of increased echogenicity with shadowing at the periphery of the left ovary which appears to be calcification on the prior CT. If this is a septated cystic lesion, the septations are greater than 3 mm in thickness. There is normal left intra-ovarian blood flow.     Trace free fluid seen adjacent to the left adnexa.    IMPRESSION:     1. Cluster of cysts versus a complex septated cystic lesion in the left ovary. This occupies the majority of the left ovary. There are some peripheral calcification seen within the left ovary. Given patient's age and size of the cysts/complex cystic lesion, recommend OB surgical consultation. Low-grade neoplasm cannot be excluded.  2. Trace free fluid seen adjacent to the left adnexa.  3. Right ovary obscured by bowel gas.  4. Unremarkable appearance to the uterus and endometrium for patient age.    REPORT  SIGNED BY DR. Yordan Hernandez          CT ABDOMEN AND PELVIS (URINARY TRACT CALCULI PROTOCOL):  1/3/2019 9:07 PM.    COMPARISON: None.    CLINICAL DATA: Flank pain. Stone disease suspected.    TECHNIQUE: This examination was tailored for evaluation of urinary tract stones.  Unenhanced contiguous transaxial images were obtained through the abdomen and pelvis.  Coronal reformatted images were also obtained through the abdomen and pelvis.    FINDINGS:    As requested, this study was performed without intravenous or oral contrast.  Evaluation of the solid organs and bowel is therefore limited.    There are multiple scattered solid appearing hepatic masses. A dominant mass in the lateral segment of the left lobe of the lumbar has a lobulated margin and is heterogeneous anteriorly, measuring 6.0 cm transverse by 4.8 cm AP. The largest right lobe hepatic lesion is located within the medial aspect of the posterior segment of the right lobe, measuring 2.6 cm AP by 1.8 cm transverse. The gallbladder appears normal. The limited noncontrast appearance of the spleen, pancreas and adrenal glands is unremarkable.    Right Kidney /Urinary Tract:   There are 2 adjacent 2 mm nonobstructing calculi in the lower pole of the right kidney. There is no evidence of ureteral calculi, ureteral dilatation or hydronephrosis.    Left Kidney /Urinary Tract:    No renal calculi are identified. There is no evidence of ureteral calculi, ureteral dilatation or hydronephrosis.    Mild atherosclerotic aortic calcification is noted. No evidence of an abdominal aortic aneurysm. No abdominal or pelvic lymphadenopathy is identified. The right ovary has a complex cystic appearance with multiple adjacent cysts versus a complex septated cystic lesion with thin echogenic measurement of 4.37 m AP by 2.8 cm transverse. A few small left adnexal calcifications are noted along the periphery of this cystic lesion. No free pelvic fluid is identified.    The large  and small bowel are normal in caliber. No evidence of bowel wall thickening or bowel obstruction. Normal appendix. The urinary bladder appears normal.    There are some scattered ovoid lucencies within the medial portions of the iliac bones and lumbar spine, nonspecific.    IMPRESSION:     1.  Multiple hepatic masses, measuring up to 6.0 cm in diameter. Recommend correlation with a multiphase MR or CT examination of the abdomen and pelvis (liver) for further evaluation.  2.  Complex cystic appearance of the left ovary/adnexa with either multiple adjacent left ovarian cysts versus a complex septated cystic lesion with an aggregate measurement of 4.3 cm. Recommend correlation with a pelvic ultrasound for further evaluation.  3.  Subtle ovoid lucent areas within the lower spine and iliac bones, nonspecific and indeterminate for localized osteopenia versus lytic osseous lesions. Metastatic disease is not entirely excluded.  4.  Two tiny nonobstructing right renal calculi.  5.  No evidence of ureteral calculi or urinary tract obstruction.      REPORT SIGNED BY DR. Khan               ASSESSMENT/PLAN:    Gricelda Navarro is a 58 year old female who presents for evaluation of chronic RLQ abdominal pain     Patient presents to the clinic today with concerns of a chronic right lower quadrant abdominal pain that first started after tubal ligation in 2004 in [facility].  Symptoms became exacerbated in the last 4-5 months.  She began to notice gross hematuria in December 2018 and subsequently went to the emergency room for further evaluation.  During her eval she was noted to have multiple abnormal findings on CT scan of the abdomen pelvis including 2 tiny right kidney stones, left ovarian mass and multiple liver masses.  She was further evaluated in the emergency room with a pelvic sonogram which revealed cluster of cysts versus complex septated cystic lesion in the left ovary. It was recommended that she follow up with an OB-gyn as  a low grade neoplasm could not be ruled out.     She was evaluated by an Ob gyn on 1/9/19 at [facility]. Her  was tested and pt states it was wnl. She was advised by Ob-gyn to have a left salpingo-oopherectomy if Ca 125 is wnl.  Advised patient to follow-up with OBgyn in this regard.    The liver masses noted on the CT abdomen pelvis was further evaluated with an MR abdomen which revealed multiple hepatic hemangiomas with largest measuring 6.5 cm in diameter.  She was advised to have a repeat MRI in 6 months.    She has also been evaluated by urologist due to the tiny right kidney stones were noted.  It was thought that the kidney stones are not likely causing her symptoms however she is going to be evaluated with a cystoscopy next week.    From a GI standpoint she has been evaluated with a colonoscopy in November 2017 which besides internal hemorrhoids was unremarkable.  Since her pain has exacerbated I recommended repeating a colonoscopy to rule out any potential underlying colon pathology however this is unlikely. She does not have any rectal bleeding at this time nor does she have any unexplained weight loss which are reassuring signs.  She prefers to avoid repeating colonoscopy at this time. She would like to proceed with her urological evaluation and consider colonoscopy thereafter.  Orders have been placed.    Recommended trial of MiraLAX for constipation. Although she denies having constipation she is having a bowel movement every 1-2 days with straining and difficulty.  She is willing to give this a try.  Prescription sent to local pharmacy.        RLQ abdominal pain  Constipation, unspecified constipation type  Liver hemangioma  Ovarian mass, left      Orders Placed This Encounter   Procedures     GASTROENTEROLOGY ADULT REF PROCEDURE ONLY [facility] [phone]; (Dr. Cueva)           Thank you for this consultation.  It was a pleasure to participate in the care of this patient; please  contact us with any further questions.      This note was created with voice recognition software, and while reviewed for accuracy, typos may remain.     Julito Page PA-C  Gastroenterology  [facility]

## 2019-04-30 ENCOUNTER — OFFICE VISIT (OUTPATIENT)
Dept: UROLOGY | Facility: CLINIC | Age: 59
End: 2019-04-30
Payer: COMMERCIAL

## 2019-04-30 VITALS — DIASTOLIC BLOOD PRESSURE: 68 MMHG | SYSTOLIC BLOOD PRESSURE: 107 MMHG

## 2019-04-30 DIAGNOSIS — R31.0 GROSS HEMATURIA: Primary | ICD-10-CM

## 2019-04-30 PROCEDURE — 52000 CYSTOURETHROSCOPY: CPT | Performed by: UROLOGY

## 2019-04-30 NOTE — PROGRESS NOTES
The patient is here for cystoscopy for evaluation of hematuria and pelvic pain.     Patient is prepped and draped.  Flexible cystoscope placed under direct vision.      Cysto: The anterior urethra is normal     In the bladder there is normal mucosa.    Assessment/Plan:  (R31.0) Gross hematuria  (primary encounter diagnosis)  Comment:    Plan: neg urological evaluation.            F/u as needed

## 2019-05-03 ENCOUNTER — HEALTH MAINTENANCE LETTER (OUTPATIENT)
Age: 59
End: 2019-05-03

## 2019-05-29 ENCOUNTER — OFFICE VISIT (OUTPATIENT)
Dept: FAMILY MEDICINE | Facility: CLINIC | Age: 59
End: 2019-05-29
Payer: COMMERCIAL

## 2019-05-29 VITALS
WEIGHT: 84.2 LBS | HEIGHT: 55 IN | OXYGEN SATURATION: 97 % | DIASTOLIC BLOOD PRESSURE: 58 MMHG | HEART RATE: 65 BPM | SYSTOLIC BLOOD PRESSURE: 97 MMHG | TEMPERATURE: 98.4 F | RESPIRATION RATE: 16 BRPM | BODY MASS INDEX: 19.48 KG/M2

## 2019-05-29 DIAGNOSIS — D50.8 OTHER IRON DEFICIENCY ANEMIA: ICD-10-CM

## 2019-05-29 DIAGNOSIS — E55.9 VITAMIN D DEFICIENCY: ICD-10-CM

## 2019-05-29 DIAGNOSIS — M62.89 PELVIC FLOOR DYSFUNCTION: ICD-10-CM

## 2019-05-29 DIAGNOSIS — R10.31 RLQ ABDOMINAL PAIN: Primary | ICD-10-CM

## 2019-05-29 DIAGNOSIS — Z12.4 SCREENING FOR MALIGNANT NEOPLASM OF CERVIX: ICD-10-CM

## 2019-05-29 PROCEDURE — 99214 OFFICE O/P EST MOD 30 MIN: CPT | Performed by: FAMILY MEDICINE

## 2019-05-29 RX ORDER — CHOLECALCIFEROL (VITAMIN D3) 50 MCG
TABLET ORAL
Qty: 100 TABLET | Refills: 3 | Status: SHIPPED | OUTPATIENT
Start: 2019-05-29 | End: 2019-10-09

## 2019-05-29 RX ORDER — FERROUS GLUCONATE 324(38)MG
TABLET ORAL
Qty: 90 TABLET | Refills: 1 | Status: SHIPPED | OUTPATIENT
Start: 2019-05-29 | End: 2019-10-09

## 2019-05-29 ASSESSMENT — MIFFLIN-ST. JEOR: SCORE: 793.18

## 2019-05-29 ASSESSMENT — PAIN SCALES - GENERAL: PAINLEVEL: SEVERE PAIN (7)

## 2019-05-29 NOTE — PROGRESS NOTES
"Subjective     Gricelda Navarro is a 58 year old female who presents to clinic today for the following health issues:    HPI     Abdominal Pain      Duration: 5 months     Description (location/character/radiation): lower right abdomen       Associated flank pain: None    Intensity:  severe, 7/10    Accompanying signs and symptoms:        Fever/Chills: no        Gas/Bloating: no        Nausea/vomitting: no        Diarrhea: no        Dysuria or Hematuria: no     History (previous similar pain/trauma/previous testing): No history    Precipitating or alleviating factors:       Pain worse with eating/BM/urination: When eats something spicy or carrying something heavy       Pain relieved by BM: no     Therapies tried and outcome: None    LMP:  not applicable    Seen by OB/GYN, urology and GI without specific cause found thought pelvic floor muscle dysfunction suspected.    Had three sessions of PT but was frustrated by abd stretching exercises.      Iron deficiency - requesting refill on iron as helps with fatigue.    Vitamin D deficiency - requesting refill as helps with fatigue.    Reviewed and updated as needed this visit by Provider  Tobacco  Allergies  Meds  Problems  Med Hx  Surg Hx  Fam Hx         Review of Systems   ROS COMP: Constitutional, HEENT, cardiovascular, pulmonary, GI, , musculoskeletal, neuro, skin, endocrine and psych systems are negative, except as otherwise noted.      Objective    BP 97/58 (BP Location: Left arm, Patient Position: Sitting, Cuff Size: Adult Regular)   Pulse 65   Temp 98.4  F (36.9  C) (Oral)   Resp 16   Ht 1.372 m (4' 6\")   Wt 38.2 kg (84 lb 3.2 oz)   LMP 12/25/2013   SpO2 97%   Breastfeeding? No   BMI 20.30 kg/m    Body mass index is 20.3 kg/m .  Physical Exam   GENERAL: healthy, alert and no distress  NECK: no adenopathy, no asymmetry, masses, or scars and thyroid normal to palpation  RESP: lungs clear to auscultation - no rales, rhonchi or wheezes  CV: regular rate and " rhythm, normal S1 S2, no S3 or S4, no murmur, click or rub, no peripheral edema and peripheral pulses strong  ABDOMEN: soft, nontender, no hepatosplenomegaly, no masses and bowel sounds normal  MS: no gross musculoskeletal defects noted, no edema  PSYCH: mentation appears normal and affect flat    Diagnostic Test Results:  Labs reviewed in Epic        Assessment & Plan     1. RLQ abdominal pain  Suspect related to pelvic floor dysfunction - PT  - [facility] REFERRAL; Future    2. Pelvic floor dysfunction  As above  - [facility] REFERRAL; Future    3. Screening for malignant neoplasm of cervix  Patient to follow up in 1 month for this    4. Other iron deficiency anemia  Refilled  - ferrous gluconate (FERGON) 324 (38 Fe) MG tablet; TAKE 1 TABLET BY MOUTH TWICE DAILY // IB ZAUG NOJ IB LUB, IB HNUB NOJ OB ZAUG PAB JAHAIRA NTSHAV LIAB  Dispense: 90 tablet; Refill: 1    5. Vitamin D deficiency  Refilled.  - vitamin D3 (CHOLECALCIFEROL) 2000 units (50 mcg) tablet; TAKE 1 TABLET BY MOUTH ONCE DAILY // IB HNUB NOJ IB LUB  Dispense: 100 tablet; Refill: 3       The uses and side effects, including black box warnings as appropriate, were discussed in detail.  All patient questions were answered.  The patient was instructed to call immediately if any side effects developed.     Return in about 1 month (around 6/26/2019).    Marycarmen Cuba MD  [facility]

## 2019-05-29 NOTE — PATIENT INSTRUCTIONS
At [facility], we strive to deliver an exceptional experience to you, every time we see you.  If you receive a survey in the mail, please send us back your thoughts. We really do value your feedback.    Based on your medical history, these are the current health maintenance/preventive care services that you are due for (some may have been done at this visit.)  Health Maintenance Due   Topic Date Due     HIV SCREENING  07/12/1976     DTAP/TDAP/TD IMMUNIZATION (1 - Tdap) 07/12/1986     ZOSTER IMMUNIZATION (1 of 2) 07/12/2011     PHQ-9  01/28/2016     PREVENTIVE CARE VISIT  02/24/2017     PAP  02/24/2019         Suggested websites for health information:  Www.Podclass.org : Up to date and easily searchable information on multiple topics.  Www.medlineplus.gov : medication info, interactive tutorials, watch real surgeries online  Www.familydoctor.org : good info from the Academy of Family Physicians  Www.cdc.gov : public health info, travel advisories, epidemics (H1N1)  Www.aap.org : children's health info, normal development, vaccinations  Www.health.[facility].[place].us : [place] dept of health, public health issues in [place], N1N1    Your care team:                            Family Medicine Internal Medicine   MD Skinny Evans MD Shantel Branch-Fleming, MD Katya Georgiev PA-C Nam Ho, MD Pediatrics   MARSHA Combs, MD Simran Anna CNP, MD Deborah Mielke, MD Kim Thein, APRN CNP      Clinic hours: Monday - Thursday 7 am-7 pm; Fridays 7 am-5 pm.   Urgent care: Monday - Friday 11 am-9 pm; Saturday and Sunday 9 am-5 pm.  Pharmacy : Monday -Thursday 8 am-8 pm; Friday 8 am-6 pm; Saturday and Sunday 9 am-5 pm.     Clinic: [phone]   Pharmacy: [phone]

## 2019-06-18 ENCOUNTER — TELEPHONE (OUTPATIENT)
Dept: FAMILY MEDICINE | Facility: CLINIC | Age: 59
End: 2019-06-18

## 2019-06-18 NOTE — TELEPHONE ENCOUNTER
Panel Management Review   One phone call and send letter if unable to reach them or MyChart message and send letter if not read after 2 weeks (You will get a message to your inbasket)      BP Readings from Last 1 Encounters:   05/29/19 97/58        Health Maintenance Due   Topic Date Due     HIV SCREENING  07/12/1976     DTAP/TDAP/TD IMMUNIZATION (1 - Tdap) 07/12/1986     ZOSTER IMMUNIZATION (1 of 2) 07/12/2011     PHQ-9  01/28/2016     PREVENTIVE CARE VISIT  02/24/2017     PAP  02/24/2019        Fail List measure:     Depression / Dysthymia review    Measure:  Needs PHQ-9 score of 4 or less during index window.  Administer PHQ-9 and if score is 5 or more, send encounter to provider for next steps.      PHQ-9 SCORE 5/17/2018 1/16/2019 1/30/2019   PHQ-9 Total Score - - -   PHQ-9 Total Score 11 22 13       If PHQ-9 recheck is 5 or more, route to provider for next steps.    Patient is due for:  PHQ9        Patient is due/failing the following:   PHQ9    Action needed:   Patient needs to do PHQ9.    Type of outreach:    Phone, left message for patient to call back.  and Sent [employer] message.    [email or web address], MEDICAL ASSISTANT

## 2019-08-20 ENCOUNTER — TELEPHONE (OUTPATIENT)
Dept: FAMILY MEDICINE | Facility: CLINIC | Age: 59
End: 2019-08-20

## 2019-08-20 NOTE — TELEPHONE ENCOUNTER
Panel Management Review   One phone call and send letter if unable to reach them or MyChart message and send letter if not read after 2 weeks (You will get a message to your inbasket)      BP Readings from Last 1 Encounters:   05/29/19 97/58        Health Maintenance Due   Topic Date Due     HIV SCREENING  07/12/1976     DTAP/TDAP/TD IMMUNIZATION (1 - Tdap) 07/12/1986     ZOSTER IMMUNIZATION (1 of 2) 07/12/2011     PREVENTIVE CARE VISIT  02/24/2017     PAP  02/24/2019     PHQ-9  07/30/2019        Fail List measure:     Depression / Dysthymia review    Measure:  Needs PHQ-9 score of 4 or less during index window.  Administer PHQ-9 and if score is 5 or more, send encounter to provider for next steps.        PHQ-9 SCORE 5/17/2018 1/16/2019 1/30/2019   PHQ-9 Total Score - - -   PHQ-9 Total Score 11 22 13       If PHQ-9 recheck is 5 or more, route to provider for next steps.    Patient is due for:  PHQ9        Patient is due/failing the following:   PHQ9    Action needed:   Patient needs office visit for depression/anxiety with provider.    Type of outreach:    Sent MyChart message.    [email or web address], MEDICAL ASSISTANT

## 2019-09-07 PROBLEM — R10.84 ABDOMINAL PAIN, GENERALIZED: Status: RESOLVED | Noted: 2019-02-06 | Resolved: 2019-09-07

## 2019-09-07 NOTE — PROGRESS NOTES
DISCHARGE SUMMARY    Gricelda Navarro was seen 3 times for evaluation and treatment.  Patient did not return for further treatment and current status is unknown.  Due to short treatment duration, no objective or functional changes were made.  Please see goal flow sheet from episode noted date below and initial evaluation for further information.  Patient is discharged from therapy and therapy episode is resolved as of 09/07/19.      Linked Episodes   Type: Episode: Status: Noted: Resolved: Last update: Updated by:   PHYSICAL THERAPY chronic R abdominal pain 2-6-19 Active 2/6/2019 2/27/2019  9:44 AM Chloe Argueta, PT      Comments:

## 2019-09-30 ENCOUNTER — OFFICE VISIT (OUTPATIENT)
Dept: FAMILY MEDICINE | Facility: CLINIC | Age: 59
End: 2019-09-30
Payer: COMMERCIAL

## 2019-09-30 VITALS
BODY MASS INDEX: 18.47 KG/M2 | OXYGEN SATURATION: 95 % | SYSTOLIC BLOOD PRESSURE: 97 MMHG | TEMPERATURE: 97.9 F | WEIGHT: 79.8 LBS | HEIGHT: 55 IN | HEART RATE: 86 BPM | DIASTOLIC BLOOD PRESSURE: 58 MMHG

## 2019-09-30 DIAGNOSIS — R10.31 RLQ ABDOMINAL PAIN: ICD-10-CM

## 2019-09-30 DIAGNOSIS — K29.50 CHRONIC GASTRITIS, PRESENCE OF BLEEDING UNSPECIFIED, UNSPECIFIED GASTRITIS TYPE: ICD-10-CM

## 2019-09-30 DIAGNOSIS — E04.1 THYROID NODULE: Primary | ICD-10-CM

## 2019-09-30 LAB
ERYTHROCYTE [DISTWIDTH] IN BLOOD BY AUTOMATED COUNT: 13.5 % (ref 10–15)
HCT VFR BLD AUTO: 37.6 % (ref 35–47)
HGB BLD-MCNC: 12.3 G/DL (ref 11.7–15.7)
MCH RBC QN AUTO: 29.1 PG (ref 26.5–33)
MCHC RBC AUTO-ENTMCNC: 32.7 G/DL (ref 31.5–36.5)
MCV RBC AUTO: 89 FL (ref 78–100)
PLATELET # BLD AUTO: 177 10E9/L (ref 150–450)
RBC # BLD AUTO: 4.22 10E12/L (ref 3.8–5.2)
WBC # BLD AUTO: 5.4 10E9/L (ref 4–11)

## 2019-09-30 PROCEDURE — 85027 COMPLETE CBC AUTOMATED: CPT | Performed by: FAMILY MEDICINE

## 2019-09-30 PROCEDURE — 84443 ASSAY THYROID STIM HORMONE: CPT | Performed by: FAMILY MEDICINE

## 2019-09-30 PROCEDURE — 99214 OFFICE O/P EST MOD 30 MIN: CPT | Performed by: FAMILY MEDICINE

## 2019-09-30 PROCEDURE — 36415 COLL VENOUS BLD VENIPUNCTURE: CPT | Performed by: FAMILY MEDICINE

## 2019-09-30 ASSESSMENT — PAIN SCALES - GENERAL: PAINLEVEL: NO PAIN (0)

## 2019-09-30 ASSESSMENT — MIFFLIN-ST. JEOR: SCORE: 768.22

## 2019-09-30 NOTE — PROGRESS NOTES
"Subjective     Gricelda Navarro is a 59 year old female who presents to clinic today for the following health issues:    HPI   ENT Symptoms             Symptoms: cc Present Absent Comment   Fever/Chills   x    Fatigue  x     Muscle Aches   x    Eye Irritation  x     Sneezing   x    Nasal Dionicoi/Drg   x    Sinus Pressure/Pain   x    Loss of smell   x    Dental pain   x    Sore Throat   x    Swollen Glands  x     Ear Pain/Fullness   x    Cough   x    Wheeze   x    Chest Pain   x    Shortness of breath   x    Rash   x    Other   x      Symptom duration:  2 weeks ago    Symptom severity:  mild    Treatments tried:  none    Contacts:  no        None           Reviewed and updated as needed this visit by Provider  Tobacco  Allergies  Meds  Problems  Med Hx  Surg Hx  Fam Hx         Review of Systems   ROS COMP: Constitutional, HEENT, cardiovascular, pulmonary, gi and gu systems are negative, except as otherwise noted.      Objective    BP 97/58 (BP Location: Left arm, Patient Position: Chair, Cuff Size: Adult Small)   Pulse 86   Temp 97.9  F (36.6  C) (Oral)   Ht 1.372 m (4' 6\")   Wt 36.2 kg (79 lb 12.8 oz)   LMP 12/25/2013   SpO2 95%   BMI 19.24 kg/m    Body mass index is 19.24 kg/m .  Physical Exam   GENERAL: healthy, alert and no distress  NECK: no adenopathy, no asymmetry, masses, or scars and thyroid nodule RIGHT  RESP: lungs clear to auscultation - no rales, rhonchi or wheezes  CV: regular rate and rhythm, normal S1 S2, no S3 or S4, no murmur, click or rub, no peripheral edema and peripheral pulses strong  ABDOMEN: soft, nontender, no hepatosplenomegaly, no masses and bowel sounds normal  MS: no gross musculoskeletal defects noted, no edema  PSYCH: mentation appears normal and affect flat    Diagnostic Test Results:  Labs reviewed in Epic        Assessment & Plan     1. Thyroid nodule  Discussed possible etiologies - Check labs and ultrasound with referrals as needed  - TSH with free T4 reflex  - US Thyroid; " Future    2. RLQ abdominal pain  Not taking GERD medication and pain okay as long as not lifting heavy things    3. Chronic gastritis, presence of bleeding unspecified, unspecified gastritis type  As above - check lab.  - CBC with platelets       Return in about 2 weeks (around 10/14/2019).    Marycarmen Cuba MD  [facility]

## 2019-09-30 NOTE — PATIENT INSTRUCTIONS
Call [phone] to schedule thyroid ultrasound.  Patient Education   At [facility], we strive to deliver an exceptional experience to you, every time we see you.  If you receive a survey in the mail, please send us back your thoughts. We really do value your feedback.    Your care team:                            Family Medicine Internal Medicine   MD Skinny Evans MD Shantel Branch-Fleming, MD Katya Georgiev PA-C Megan Hill, GREGORY Lo MD Pediatrics   MARSHA Combs, MD Cathy Nicholas APRN CNP   MD Simran Bowie MD Deborah Mielke, MD Kim Thein, [facility]      Clinic hours: Monday - Thursday 7 am-7 pm; Fridays 7 am-5 pm.   Urgent care: Monday - Friday 11 am-9 pm; Saturday and Sunday 9 am-5 pm.  Pharmacy : Monday -Thursday 8 am-8 pm; Friday 8 am-6 pm; Saturday and Sunday 9 am-5 pm.     Clinic: [phone]   Pharmacy: [phone]

## 2019-10-01 LAB — TSH SERPL DL<=0.005 MIU/L-ACNC: 0.45 MU/L (ref 0.4–4)

## 2019-10-02 ASSESSMENT — PATIENT HEALTH QUESTIONNAIRE - PHQ9
5. POOR APPETITE OR OVEREATING: NOT AT ALL
SUM OF ALL RESPONSES TO PHQ QUESTIONS 1-9: 14

## 2019-10-02 ASSESSMENT — ANXIETY QUESTIONNAIRES
1. FEELING NERVOUS, ANXIOUS, OR ON EDGE: NOT AT ALL
IF YOU CHECKED OFF ANY PROBLEMS ON THIS QUESTIONNAIRE, HOW DIFFICULT HAVE THESE PROBLEMS MADE IT FOR YOU TO DO YOUR WORK, TAKE CARE OF THINGS AT HOME, OR GET ALONG WITH OTHER PEOPLE: NOT DIFFICULT AT ALL
GAD7 TOTAL SCORE: 0
5. BEING SO RESTLESS THAT IT IS HARD TO SIT STILL: NOT AT ALL
2. NOT BEING ABLE TO STOP OR CONTROL WORRYING: NOT AT ALL
3. WORRYING TOO MUCH ABOUT DIFFERENT THINGS: NOT AT ALL
7. FEELING AFRAID AS IF SOMETHING AWFUL MIGHT HAPPEN: NOT AT ALL
6. BECOMING EASILY ANNOYED OR IRRITABLE: NOT AT ALL

## 2019-10-03 ASSESSMENT — ANXIETY QUESTIONNAIRES: GAD7 TOTAL SCORE: 0

## 2019-10-04 ENCOUNTER — TELEPHONE (OUTPATIENT)
Dept: FAMILY MEDICINE | Facility: CLINIC | Age: 59
End: 2019-10-04

## 2019-10-04 ENCOUNTER — ANCILLARY PROCEDURE (OUTPATIENT)
Dept: ULTRASOUND IMAGING | Facility: CLINIC | Age: 59
End: 2019-10-04
Attending: FAMILY MEDICINE
Payer: COMMERCIAL

## 2019-10-04 DIAGNOSIS — E04.1 THYROID NODULE: ICD-10-CM

## 2019-10-04 PROCEDURE — 76536 US EXAM OF HEAD AND NECK: CPT

## 2019-10-04 NOTE — RESULT ENCOUNTER NOTE
Please call.    Multiple nodules seen on the thyroid ultrasound but your thyroid blood test was normal.  We could consider biopsy to double check the thyroid nodules, have you seen a specialist or continue to monitor this with blood tests.    I would suggest having the biopsies done.    Which would you prefer?    Marycarmen Robert M.D.

## 2019-10-04 NOTE — LETTER
October 8, 2019      Gircelda Navarro  [address] [place] [place] [address]            Dear Gricelda Navarro    Multiple nodules seen on the thyroid ultrasound but your thyroid blood test was normal.  We could consider biopsy to double check the thyroid nodules, have you seen a specialist or continue to monitor this with blood tests.    I would suggest having the biopsies done.    Which would you prefer?    Marycarmen Robert M.D      Results for orders placed or performed in visit on 10/04/19   US Thyroid    Narrative    ULTRASOUND THYROID  10/4/2019 10:11 AM     COMPARISON: None.    HISTORY: Thyroid nodule.    FINDINGS: The right lobe measures 5.8 x 2.0 x 1.9 cm. The left lobe  measures 5.0 x 1.8 x 2.6 cm. The isthmus is normal in thickness.  Thyroid parenchyma is homogenous in echotexture.    Thyroid nodules as follows: In the inferior right lobe of the thyroid,  there is a complex nodule that measures 2.0 x 1.2 x 1.7 cm.  2. In the superior right lobe, there is a complex nodule that measures  1.1 x 0.6 x 1.1 cm.  3. In the inferior right lobe, there is a complex nodule that measures  0.6 x 0.3 x 0.5 cm.  4. In the inferior left lobe, there is a complex nodule that measures  1.5 x 1.0 x 0.7 cm and contains a calcification.  5. In the inferior left lobe, there is a solid hyperechoic nodule that  measures 1.2 x 0.8 x 1.0 cm.  6. In the superior left lobe, there is a complex nodule that measures  1.0 x 0.8 x 1.0 cm.      Impression    IMPRESSION: Multinodular thyroid as described above. Consider FNA of  the dominant nodules.    TALHA ALVAREZ MD

## 2019-10-04 NOTE — TELEPHONE ENCOUNTER
"Attempted to contact patient with [employer]  but both numbers in chart are \"not working numbers\" when the  calls the lines.     Please advise next steps.   Routing to provider to review and advise.   Yulia Houston RN          Notes recorded by Marycarmen Taylor MD on 10/4/2019 at 1:51 PM CDT  Please call.    Multiple nodules seen on the thyroid ultrasound but your thyroid blood test was normal.  We could consider biopsy to double check the thyroid nodules, have you seen a specialist or continue to monitor this with blood tests.    I would suggest having the biopsies done.    Which would you prefer?    Marycarmen Robert M.D.      "

## 2019-10-08 NOTE — TELEPHONE ENCOUNTER
Created printed and mailed certified results letter. #[id number] [phone]. This will go out in tomorrow's mail.  Harini Askew MA   For [facility]  2nd Floor Primary Care

## 2019-10-09 ENCOUNTER — DOCUMENTATION ONLY (OUTPATIENT)
Dept: LAB | Facility: CLINIC | Age: 59
End: 2019-10-09

## 2019-10-09 ENCOUNTER — OFFICE VISIT (OUTPATIENT)
Dept: FAMILY MEDICINE | Facility: CLINIC | Age: 59
End: 2019-10-09
Payer: COMMERCIAL

## 2019-10-09 VITALS
TEMPERATURE: 98.3 F | HEIGHT: 55 IN | OXYGEN SATURATION: 98 % | WEIGHT: 79 LBS | SYSTOLIC BLOOD PRESSURE: 91 MMHG | BODY MASS INDEX: 18.28 KG/M2 | DIASTOLIC BLOOD PRESSURE: 51 MMHG | HEART RATE: 68 BPM

## 2019-10-09 DIAGNOSIS — Z12.11 SCREEN FOR COLON CANCER: Primary | ICD-10-CM

## 2019-10-09 DIAGNOSIS — E04.2 MULTINODULAR THYROID: Primary | ICD-10-CM

## 2019-10-09 PROCEDURE — 99213 OFFICE O/P EST LOW 20 MIN: CPT | Performed by: FAMILY MEDICINE

## 2019-10-09 ASSESSMENT — MIFFLIN-ST. JEOR: SCORE: 764.59

## 2019-10-09 ASSESSMENT — PAIN SCALES - GENERAL: PAINLEVEL: NO PAIN (0)

## 2019-10-09 NOTE — PATIENT INSTRUCTIONS
At [facility], we strive to deliver an exceptional experience to you, every time we see you.  If you receive a survey in the mail, please send us back your thoughts. We really do value your feedback.    Your care team:                            Family Medicine Internal Medicine   MD Skinny Evans MD Shantel Branch-Fleming, MD Katya Georgiev PA-C Megan Hill, APRN LORNA Lo MD Pediatrics   Itz Morales, MARSHA Bradshaw, MD Cathy Nicholas APRN CNP   MD Simran Bowie MD Deborah Mielke, MD Dinorah Lundberg, APRN [facility]      Clinic hours: Monday - Thursday 7 am-7 pm; Fridays 7 am-5 pm.   Urgent care: Monday - Friday 11 am-9 pm; Saturday and Sunday 9 am-5 pm.  Pharmacy : Monday -Thursday 8 am-8 pm; Friday 8 am-6 pm; Saturday and Sunday 9 am-5 pm.     Clinic: [phone]   Pharmacy: [phone]

## 2019-10-09 NOTE — PROGRESS NOTES
Patient has lab appointment today for drop of  FIT testing . There ws no order,  please place FIT future order so we can process the stool sample.  Thank you!

## 2019-10-09 NOTE — PROGRESS NOTES
"Subjective     Gricelda Navarro is a 59 year old female who presents to clinic today for the following health issues:    HPI   Follow up from 9/30/19 thyroid    Reviewed and updated as needed this visit by Provider  Tobacco  Allergies  Meds  Problems  Med Hx  Surg Hx  Fam Hx         Review of Systems   ROS COMP: Constitutional, HEENT, cardiovascular, pulmonary, gi and gu systems are negative, except as otherwise noted.      Objective    BP 91/51   Pulse 68   Temp 98.3  F (36.8  C) (Oral)   Ht 1.372 m (4' 6\")   Wt 35.8 kg (79 lb)   LMP 12/25/2013   SpO2 98%   Breastfeeding? No   BMI 19.05 kg/m    Body mass index is 19.05 kg/m .  Physical Exam   GENERAL: healthy, alert and no distress  NECK: no adenopathy, no asymmetry, masses, or scars and thyroid nodule right  RESP: lungs clear to auscultation - no rales, rhonchi or wheezes  CV: regular rate and rhythm, normal S1 S2, no S3 or S4, no murmur, click or rub, no peripheral edema and peripheral pulses strong  ABDOMEN: soft, nontender, no hepatosplenomegaly, no masses and bowel sounds normal  MS: no gross musculoskeletal defects noted, no edema    Diagnostic Test Results:  Labs reviewed in Epic  Results for orders placed or performed in visit on 10/04/19   US Thyroid    Narrative    ULTRASOUND THYROID  10/4/2019 10:11 AM     COMPARISON: None.    HISTORY: Thyroid nodule.    FINDINGS: The right lobe measures 5.8 x 2.0 x 1.9 cm. The left lobe  measures 5.0 x 1.8 x 2.6 cm. The isthmus is normal in thickness.  Thyroid parenchyma is homogenous in echotexture.    Thyroid nodules as follows: In the inferior right lobe of the thyroid,  there is a complex nodule that measures 2.0 x 1.2 x 1.7 cm.  2. In the superior right lobe, there is a complex nodule that measures  1.1 x 0.6 x 1.1 cm.  3. In the inferior right lobe, there is a complex nodule that measures  0.6 x 0.3 x 0.5 cm.  4. In the inferior left lobe, there is a complex nodule that measures  1.5 x 1.0 x 0.7 cm and " contains a calcification.  5. In the inferior left lobe, there is a solid hyperechoic nodule that  measures 1.2 x 0.8 x 1.0 cm.  6. In the superior left lobe, there is a complex nodule that measures  1.0 x 0.8 x 1.0 cm.      Impression    IMPRESSION: Multinodular thyroid as described above. Consider FNA of  the dominant nodules.    TALHA ALVAREZ MD           Assessment & Plan     1. Multinodular thyroid  Referral to endocrinology for further discussion as patient is afraid to have the aspirations done.  - ENDOCRINOLOGY ADULT REFERRAL       Return in about 1 year (around 10/9/2020).    Marycarmen Cuba MD  [facility]

## 2019-10-17 ENCOUNTER — OFFICE VISIT (OUTPATIENT)
Dept: ENDOCRINOLOGY | Facility: CLINIC | Age: 59
End: 2019-10-17
Payer: COMMERCIAL

## 2019-10-17 VITALS
HEART RATE: 62 BPM | WEIGHT: 79.6 LBS | DIASTOLIC BLOOD PRESSURE: 62 MMHG | SYSTOLIC BLOOD PRESSURE: 107 MMHG | BODY MASS INDEX: 19.19 KG/M2 | OXYGEN SATURATION: 100 %

## 2019-10-17 DIAGNOSIS — E04.2 MULTINODULAR THYROID: Primary | ICD-10-CM

## 2019-10-17 PROCEDURE — 99204 OFFICE O/P NEW MOD 45 MIN: CPT | Performed by: INTERNAL MEDICINE

## 2019-10-17 NOTE — PROGRESS NOTES
"CC: Thyroid nodules.     HPI: Patient is here for evaluation of thyroid nodules.   Seen with interpretor.     In 2016, she noticed enlargement of her thyroid but did not seek care until now.   Primary noted a nodule on exam and ordered an US.     She has been having a \"squeezing\" sensation in her neck for several years.   Worse with stress. She is very worried about the nodules and is wondering if there is a medication available to treat them.     She is also c/o fatigue and has been told she had \"low hemoglobin\". However, recent notation contradicts this. FIT testing has been ordered.   She was seen in the ER last month for atypical chest pain and was diagnosed as having gastritis.     ROS: 10 point ROS neg other than the symptoms noted above in the HPI.    PMH:   Patient Active Problem List   Diagnosis     Recurrent cold sores     Gastritis     Back pain     Major depression     Generalized anxiety disorder     Seasonal allergic rhinitis     Iron deficiency anemia     CARDIOVASCULAR SCREENING; LDL GOAL LESS THAN 160     Vitamin D deficiency     External hemorrhoids     Internal hemorrhoids     Pelvic floor dysfunction     RLQ abdominal pain     Nephrolithiasis     Left ovarian cyst     Liver masses     Multinodular thyroid     Meds:  No current outpatient medications on file.     No current facility-administered medications for this visit.      FHX:   No thyroid disease.     SHX:  Non-smoker.     Exam:   Vital signs:      BP: 107/62 Pulse: 62     SpO2: 100 %       Weight: 36.1 kg (79 lb 9.6 oz)  Estimated body mass index is 19.19 kg/m  as calculated from the following:    Height as of 10/9/19: 1.372 m (4' 6\").    Weight as of this encounter: 36.1 kg (79 lb 9.6 oz).  Gen: In NAD.   HEENT: no proptosis or lid lag, EOMI, ~ 2 cm nodule on the right. Left lobe is enlarged as well.   Card: S1 S2 RRR no m/r/g. no LE edema.   Pulm: CTA b/l.   GI: NT ND +BS.   MSK: no gross deformities.   Derm: no rashes or lesions. "   Neuro: no tremor, +2 DTR's.     A/P:   Thyroid nodules - I have reviewed the natural history of thyroid nodules and thyroid cancer with the patient. TSH normal. Images reviewed. She is convinced the nodules are the root of all her problems. I explained that her thyroid function is normal and nodules would not explain her lack of appetite or fatigue. Repeatedly emphasized that if nodules are not cancerous there is no need for surgery and I think if she pursues surgery on her own, she is not likely to have relief of her symptoms. Explained levothyroxine suppression therapy does not work well if at all. Her TSH is already low normal to begin with.   -Schedule biopsy of the right inferior nodule  (#1), left inferior (#4) nodule in radiology.   -Call [facility]ology scheduling for your procedure:  For scheduling in the [place] ([facility], [facility]) call [phone] or [phone]      For scheduling at [facility] ([facility] call [phone] or [phone]      For scheduling in [place] ([facility]) call [phone]  or  [phone]        Eric Holden MD on 10/17/2019 at 3:13 PM

## 2019-10-17 NOTE — LETTER
"    10/17/2019         RE: Gricelda Navarro  [address] [facility] [place] [address]        Dear Colleague,    Thank you for referring your patient, Gricelda Navarro, to the [facility]. Please see a copy of my visit note below.    CC: Thyroid nodules.     HPI: Patient is here for evaluation of thyroid nodules.   Seen with interpretor.     In 2016, she noticed enlargement of her thyroid but did not seek care until now.   Primary noted a nodule on exam and ordered an US.     She has been having a \"squeezing\" sensation in her neck for several years.   Worse with stress. She is very worried about the nodules and is wondering if there is a medication available to treat them.     She is also c/o fatigue and has been told she had \"low hemoglobin\". However, recent notation contradicts this. FIT testing has been ordered.   She was seen in the ER last month for atypical chest pain and was diagnosed as having gastritis.     ROS: 10 point ROS neg other than the symptoms noted above in the HPI.    PMH:   Patient Active Problem List   Diagnosis     Recurrent cold sores     Gastritis     Back pain     Major depression     Generalized anxiety disorder     Seasonal allergic rhinitis     Iron deficiency anemia     CARDIOVASCULAR SCREENING; LDL GOAL LESS THAN 160     Vitamin D deficiency     External hemorrhoids     Internal hemorrhoids     Pelvic floor dysfunction     RLQ abdominal pain     Nephrolithiasis     Left ovarian cyst     Liver masses     Multinodular thyroid     Meds:  No current outpatient medications on file.     No current facility-administered medications for this visit.      FHX:   No thyroid disease.     SHX:  Non-smoker.     Exam:   Vital signs:      BP: 107/62 Pulse: 62     SpO2: 100 %       Weight: 36.1 kg (79 lb 9.6 oz)  Estimated body mass index is 19.19 kg/m  as calculated from the following:    Height as of 10/9/19: 1.372 m (4' 6\").    Weight as of this encounter: 36.1 kg (79 lb 9.6 oz).  Gen: In NAD. "   HEENT: no proptosis or lid lag, EOMI, ~ 2 cm nodule on the right. Left lobe is enlarged as well.   Card: S1 S2 RRR no m/r/g. no LE edema.   Pulm: CTA b/l.   GI: NT ND +BS.   MSK: no gross deformities.   Derm: no rashes or lesions.   Neuro: no tremor, +2 DTR's.     A/P:   Thyroid nodules - I have reviewed the natural history of thyroid nodules and thyroid cancer with the patient. TSH normal. Images reviewed. She is convinced the nodules are the root of all her problems. I explained that her thyroid function is normal and nodules would not explain her lack of appetite or fatigue. Repeatedly emphasized that if nodules are not cancerous there is no need for surgery and I think if she pursues surgery on her own, she is not likely to have relief of her symptoms. Explained levothyroxine suppression therapy does not work well if at all. Her TSH is already low normal to begin with.   -Schedule biopsy of the right inferior nodule  (#1), left inferior (#4) nodule in radiology.   -Call [place] radiology scheduling for your procedure:  For scheduling in the [place] ([place], [facility], and [place]) call [phone] or [phone]      For scheduling at [facility] ([facility] call [phone] or [phone]      For scheduling in the [place] ([facility]) call [phone]  or  [phone]        Eric Holden MD on 10/17/2019 at 3:13 PM      Again, thank you for allowing me to participate in the care of your patient.        Sincerely,        Eric Holden MD

## 2019-10-17 NOTE — PATIENT INSTRUCTIONS
-Schedule biopsy of the right inferior nodule  (#1), left inferior (#4) nodule in radiology.   -Call [place] radiology scheduling for your procedure:  For scheduling in the North ([facility], [facility]) call [phone] or [phone]      For scheduling at [facility]alth ([facility], [facility]), call [phone] or [phone]      For scheduling in the [place] ([facility]) call [phone]  or  [phone]

## 2019-10-29 NOTE — TELEPHONE ENCOUNTER
"Received certified letter RETURN MAIL, \"Unclaimed, unable to forward\". Copy to TC and abstracting. Updated Demographics. Routing to provider as FYI, patient did not receive letter.   Harini Askew MA  [facility]  2nd Floor  Primary Care    "

## 2019-11-29 ENCOUNTER — ANCILLARY PROCEDURE (OUTPATIENT)
Dept: ULTRASOUND IMAGING | Facility: CLINIC | Age: 59
End: 2019-11-29
Attending: INTERNAL MEDICINE
Payer: COMMERCIAL

## 2019-11-29 ENCOUNTER — TRANSFERRED RECORDS (OUTPATIENT)
Dept: HEALTH INFORMATION MANAGEMENT | Facility: CLINIC | Age: 59
End: 2019-11-29

## 2019-11-29 DIAGNOSIS — E04.2 MULTINODULAR THYROID: ICD-10-CM

## 2019-11-29 PROCEDURE — 10006 FNA BX W/US GDN EA ADDL: CPT | Performed by: STUDENT IN AN ORGANIZED HEALTH CARE EDUCATION/TRAINING PROGRAM

## 2019-11-29 PROCEDURE — 88173 CYTOPATH EVAL FNA REPORT: CPT | Performed by: STUDENT IN AN ORGANIZED HEALTH CARE EDUCATION/TRAINING PROGRAM

## 2019-11-29 PROCEDURE — 00000102 ZZHCL STATISTIC CYTO WRIGHT STAIN TC: Performed by: STUDENT IN AN ORGANIZED HEALTH CARE EDUCATION/TRAINING PROGRAM

## 2019-11-29 PROCEDURE — 10005 FNA BX W/US GDN 1ST LES: CPT | Performed by: STUDENT IN AN ORGANIZED HEALTH CARE EDUCATION/TRAINING PROGRAM

## 2019-12-02 LAB — COPATH REPORT: NORMAL

## 2019-12-03 DIAGNOSIS — E04.2 MULTINODULAR THYROID: Primary | ICD-10-CM

## 2019-12-04 ENCOUNTER — TELEPHONE (OUTPATIENT)
Dept: ENDOCRINOLOGY | Facility: CLINIC | Age: 59
End: 2019-12-04

## 2019-12-17 ENCOUNTER — TELEPHONE (OUTPATIENT)
Dept: ENDOCRINOLOGY | Facility: CLINIC | Age: 59
End: 2019-12-17

## 2019-12-17 DIAGNOSIS — E04.2 MULTINODULAR THYROID: Primary | ICD-10-CM

## 2019-12-17 LAB — LAB SCANNED RESULT: NORMAL

## 2019-12-17 NOTE — TELEPHONE ENCOUNTER
Afirma results received. The nodule is benign.   Repeat TSH and US in 1 year and see me in clinic after.     Eric Holden MD on 12/17/2019 at 9:01 AM

## 2019-12-30 ENCOUNTER — OFFICE VISIT (OUTPATIENT)
Dept: FAMILY MEDICINE | Facility: CLINIC | Age: 59
End: 2019-12-30
Payer: MEDICAID

## 2019-12-30 ENCOUNTER — TELEPHONE (OUTPATIENT)
Dept: FAMILY MEDICINE | Facility: CLINIC | Age: 59
End: 2019-12-30

## 2019-12-30 ENCOUNTER — ANCILLARY PROCEDURE (OUTPATIENT)
Dept: GENERAL RADIOLOGY | Facility: CLINIC | Age: 59
End: 2019-12-30
Attending: NURSE PRACTITIONER
Payer: MEDICAID

## 2019-12-30 VITALS
SYSTOLIC BLOOD PRESSURE: 119 MMHG | DIASTOLIC BLOOD PRESSURE: 65 MMHG | BODY MASS INDEX: 19.21 KG/M2 | OXYGEN SATURATION: 99 % | TEMPERATURE: 98.1 F | RESPIRATION RATE: 16 BRPM | HEIGHT: 55 IN | HEART RATE: 64 BPM | WEIGHT: 83 LBS

## 2019-12-30 DIAGNOSIS — R63.0 APPETITE LOSS: ICD-10-CM

## 2019-12-30 DIAGNOSIS — M89.8X9 BONE ISLAND: Primary | ICD-10-CM

## 2019-12-30 DIAGNOSIS — R13.10 DYSPHAGIA, UNSPECIFIED TYPE: ICD-10-CM

## 2019-12-30 DIAGNOSIS — E04.2 MULTINODULAR THYROID: Primary | ICD-10-CM

## 2019-12-30 DIAGNOSIS — F33.1 MODERATE EPISODE OF RECURRENT MAJOR DEPRESSIVE DISORDER (H): ICD-10-CM

## 2019-12-30 DIAGNOSIS — R42 DIZZINESS: ICD-10-CM

## 2019-12-30 DIAGNOSIS — R10.31 RLQ ABDOMINAL PAIN: ICD-10-CM

## 2019-12-30 DIAGNOSIS — L29.9 ITCHING: ICD-10-CM

## 2019-12-30 DIAGNOSIS — N83.202 LEFT OVARIAN CYST: ICD-10-CM

## 2019-12-30 LAB
ALBUMIN UR-MCNC: NEGATIVE MG/DL
APPEARANCE UR: CLEAR
BACTERIA #/AREA URNS HPF: ABNORMAL /HPF
BILIRUB UR QL STRIP: NEGATIVE
COLOR UR AUTO: YELLOW
GLUCOSE UR STRIP-MCNC: NEGATIVE MG/DL
HGB UR QL STRIP: ABNORMAL
KETONES UR STRIP-MCNC: NEGATIVE MG/DL
LEUKOCYTE ESTERASE UR QL STRIP: NEGATIVE
NITRATE UR QL: NEGATIVE
NON-SQ EPI CELLS #/AREA URNS LPF: ABNORMAL /LPF
PH UR STRIP: 7 PH (ref 5–7)
RBC #/AREA URNS AUTO: ABNORMAL /HPF
SOURCE: ABNORMAL
SP GR UR STRIP: 1.02 (ref 1–1.03)
UROBILINOGEN UR STRIP-ACNC: 0.2 EU/DL (ref 0.2–1)
WBC #/AREA URNS AUTO: ABNORMAL /HPF

## 2019-12-30 PROCEDURE — 84443 ASSAY THYROID STIM HORMONE: CPT | Performed by: NURSE PRACTITIONER

## 2019-12-30 PROCEDURE — 74019 RADEX ABDOMEN 2 VIEWS: CPT

## 2019-12-30 PROCEDURE — 99214 OFFICE O/P EST MOD 30 MIN: CPT | Performed by: NURSE PRACTITIONER

## 2019-12-30 PROCEDURE — T1013 SIGN LANG/ORAL INTERPRETER: HCPCS | Mod: U3 | Performed by: NURSE PRACTITIONER

## 2019-12-30 PROCEDURE — 80053 COMPREHEN METABOLIC PANEL: CPT | Performed by: NURSE PRACTITIONER

## 2019-12-30 PROCEDURE — 81001 URINALYSIS AUTO W/SCOPE: CPT | Performed by: NURSE PRACTITIONER

## 2019-12-30 PROCEDURE — 36415 COLL VENOUS BLD VENIPUNCTURE: CPT | Performed by: NURSE PRACTITIONER

## 2019-12-30 RX ORDER — MIRTAZAPINE 15 MG/1
15 TABLET, ORALLY DISINTEGRATING ORAL AT BEDTIME
Qty: 30 TABLET | Refills: 1 | Status: SHIPPED | OUTPATIENT
Start: 2019-12-30 | End: 2019-12-31

## 2019-12-30 RX ORDER — CETIRIZINE HYDROCHLORIDE 10 MG/1
10 TABLET ORAL DAILY
Qty: 90 TABLET | Refills: 3 | Status: SHIPPED | OUTPATIENT
Start: 2019-12-30 | End: 2020-11-25

## 2019-12-30 ASSESSMENT — PAIN SCALES - GENERAL: PAINLEVEL: NO PAIN (0)

## 2019-12-30 ASSESSMENT — MIFFLIN-ST. JEOR: SCORE: 782.74

## 2019-12-30 NOTE — TELEPHONE ENCOUNTER
This writer attempted to contact patient with  number [phone] on 12/30/19      Reason for call results and left message.      If patient calls back:   Registered Nurse called. Follow Triage Call workflow        Michelle Venegas RN

## 2019-12-30 NOTE — TELEPHONE ENCOUNTER
Please call patient.  Xray showed a small lesion on her right hip bone around the area she has pain.  It could be normal but I want to evaluate further with an MRI.  Please have her call to schedule (can do same day as her ultrasound):  [phone].  Thanks!  Rosalinda

## 2019-12-30 NOTE — PATIENT INSTRUCTIONS
Do stool sample.    See general surgery regarding the thyroid.    Do the pelvic ultrasound. Call [phone]    Will follow up on results.     In meantime, increase calorie intake.  Can take Ensure drink 1-2 times a day.  Take mirtazapine tablet at night to help with stimulating appetite, will cause drowsiness and help with sleep as well.  Drink 8-10 glasses (80 oz) of water daily.    For itching, take Zyrtec daily.

## 2019-12-30 NOTE — PROGRESS NOTES
"Subjective     Gricelda Navarro is a 59 year old female who presents to clinic today for the following health issues:    HPI     - Follow up thyroid nodule. Just feels like something is stuck in her throat when swallowing   No problem with swallowing food or water  Would like referral for a specialist for her thyroid  Nodules benign per endocrine.  Was told to follow up in one year.  She states that \"something is going on with my body\".  Thinks related to thyroid despite negative testing    Symptoms include low appetite, feeling full.  States feels dizzy when she does not eat.  Symptoms don't seem to improve when she eats, however,  Has been seen in last year for palpitations at emergency room. Testing negative.  Patient denies chest pain, vision changes, headaches, speech changes.      Wt Readings from Last 5 Encounters:   12/30/19 37.6 kg (83 lb)   10/17/19 36.1 kg (79 lb 9.6 oz)   10/09/19 35.8 kg (79 lb)   09/30/19 36.2 kg (79 lb 12.8 oz)   05/29/19 38.2 kg (84 lb 3.2 oz)       Drinks a few sips of water a day    Bowel movements every 1-2 days- stools hard, does strain to pass them.  Denies blood in stool.  Negative workup by GI.  Abdominal pain is persistent in the RLQ- no change with eating (has L ovarian cyst-was recommended by gynecology at [facility] to remove it but patient did not want to do this as her pain is on the right side)  Was told by GI in April to do miralax- did not try this  Of note, does have diagnosis of depression and anxiety which she relates to her move to [place] from [place].  She does not like it here.  She does not want to do therapy but plans to join an adult .    Also mentions she has itching all over her body including her face and eyes at times.  Seems to improve when she applies oil.  Has not tried antihistamines.        Patient Active Problem List   Diagnosis     Recurrent cold sores     Gastritis     Back pain     Major depression     Generalized anxiety disorder     Seasonal allergic " rhinitis     Iron deficiency anemia     CARDIOVASCULAR SCREENING; LDL GOAL LESS THAN 160     Vitamin D deficiency     External hemorrhoids     Internal hemorrhoids     Pelvic floor dysfunction     RLQ abdominal pain     Nephrolithiasis     Left ovarian cyst     Liver masses     Multinodular thyroid     Past Surgical History:   Procedure Laterality Date     C/SECTION, LOW TRANSVERSE      , Low Transverse     COLONOSCOPY WITH CO2 INSUFFLATION N/A 2017    Procedure: COLONOSCOPY WITH CO2 INSUFFLATION;  colonoscopy/Dr. Destiney Cuba/Rectal mass [K62.9]  - Primary /BMi: [facility]: [phone];  Surgeon: John Aguilar MD;  Location: [facility] OR     TUBAL LIGATION         Social History     Tobacco Use     Smoking status: Never Smoker     Smokeless tobacco: Never Used   Substance Use Topics     Alcohol use: No     Family History   Problem Relation Age of Onset     Unknown/Adopted Mother      Unknown/Adopted Father          Current Outpatient Medications   Medication Sig Dispense Refill     cetirizine (ZYRTEC) 10 MG tablet Take 1 tablet (10 mg) by mouth daily 90 tablet 3     Ferrous Fumarate 325 (106 Fe) MG TABS Take 1 tablet by mouth 2 times daily       mirtazapine (REMERON SOL-TAB) 15 MG ODT Take 1 tablet (15 mg) by mouth At Bedtime 30 tablet 1     order for DME Drink one daily.    Equipment being ordered: ensure complete 30 Can 3     Allergies   Allergen Reactions     Cortisone Itching, Swelling and Rash     Cyclobenzaprine Rash     Gabapentin Itching     Hydrocodone Itching     Prednisone Hives     BP Readings from Last 3 Encounters:   19 119/65   10/17/19 107/62   10/09/19 91/51    Wt Readings from Last 3 Encounters:   19 37.6 kg (83 lb)   10/17/19 36.1 kg (79 lb 9.6 oz)   10/09/19 35.8 kg (79 lb)                      Reviewed and updated as needed this visit by Provider         Review of Systems   ROS COMP: Constitutional, HEENT, cardiovascular, pulmonary, GI, , musculoskeletal, neuro,  "skin, endocrine and psych systems are negative, except as otherwise noted.      Objective    /65 (BP Location: Left arm, Patient Position: Chair, Cuff Size: Child)   Pulse 64   Temp 98.1  F (36.7  C) (Oral)   Resp 16   Ht 1.372 m (4' 6\")   Wt 37.6 kg (83 lb)   LMP 12/25/2013   SpO2 99%   BMI 20.01 kg/m    Body mass index is 20.01 kg/m .  Physical Exam   GENERAL: healthy, alert and no distress  EYES: Eyes grossly normal to inspection, PERRL and conjunctivae and sclerae normal  HENT: ear canals and TM's normal, nose and mouth without ulcers or lesions  NECK: no adenopathy, no asymmetry, masses, or scars and thyroid normal to palpation  RESP: lungs clear to auscultation - no rales, rhonchi or wheezes  CV: regular rate and rhythm, normal S1 S2, no S3 or S4, no murmur, click or rub, no peripheral edema and peripheral pulses strong  ABDOMEN: soft, nontender, no hepatosplenomegaly, no masses and bowel sounds normal  MS: no gross musculoskeletal defects noted, no edema  PSYCH: mentation appears normal and affect flat    Diagnostic Test Results:  No results found for this or any previous visit (from the past 24 hour(s)).        Assessment & Plan     1. Multinodular thyroid  Workup benign but patient with difficulty swallowing.  Will have her follow up with gen surg regarding this.    - GENERAL SURG ADULT REFERRAL; Future  - TSH with free T4 reflex    2. Dysphagia, unspecified type  - GENERAL SURG ADULT REFERRAL; Future  - XR Abdomen 2 Views; Future    3. Appetite loss  Unclear etiology but likely multifactorial.  Will do work up as below but also start on mirtazapine for appetite stimulation and depression.    - Helicobacter pylori Antigen Stool; Future  - *UA reflex to Microscopic and Culture ([facility] (except [place] and [place])  - XR Abdomen 2 Views; Future  - mirtazapine (REMERON SOL-TAB) 15 MG ODT; Take 1 tablet (15 mg) by mouth At Bedtime  Dispense: 30 tablet; Refill: 1  - order for " DME; Drink one daily.    Equipment being ordered: ensure complete  Dispense: 30 Can; Refill: 3    4. Dizziness  More likely related to poor PO intake and dehydration.  Advised on this.  Follow up based on labs below.   - TSH with free T4 reflex  - Comprehensive metabolic panel (BMP + Alb, Alk Phos, ALT, AST, Total. Bili, TP)    5. Left ovarian cyst  Repeat imaging to see if size has increased.  Patient declined removal in February 2019 though this was recommended by her gynecologist at [facility]. Per patient  was negative but I do not have access to these records.   - US Pelvic Complete w Transvaginal; Future    6. RLQ abdominal pain  - US Pelvic Complete w Transvaginal; Future    7. Moderate episode of recurrent major depressive disorder (H)  - mirtazapine (REMERON SOL-TAB) 15 MG ODT; Take 1 tablet (15 mg) by mouth At Bedtime  Dispense: 30 tablet; Refill: 1    8. Itching  Trial of below.   - cetirizine (ZYRTEC) 10 MG tablet; Take 1 tablet (10 mg) by mouth daily  Dispense: 90 tablet; Refill: 3       Patient Instructions   Do stool sample.    See general surgery regarding the thyroid.    Do the pelvic ultrasound. Call [phone]    Will follow up on results.     In meantime, increase calorie intake.  Can take Ensure drink 1-2 times a day.  Take mirtazapine tablet at night to help with stimulating appetite, will cause drowsiness and help with sleep as well.  Drink 8-10 glasses (80 oz) of water daily.    For itching, take Zyrtec daily.        Return in about 2 weeks (around 1/13/2020) for Follow Up.    GREGORY Freeman [facility]

## 2019-12-31 ENCOUNTER — TELEPHONE (OUTPATIENT)
Dept: FAMILY MEDICINE | Facility: CLINIC | Age: 59
End: 2019-12-31

## 2019-12-31 DIAGNOSIS — F33.1 MODERATE EPISODE OF RECURRENT MAJOR DEPRESSIVE DISORDER (H): Primary | ICD-10-CM

## 2019-12-31 DIAGNOSIS — R63.0 LOSS OF APPETITE: ICD-10-CM

## 2019-12-31 LAB
ALBUMIN SERPL-MCNC: 3.6 G/DL (ref 3.4–5)
ALP SERPL-CCNC: 92 U/L (ref 40–150)
ALT SERPL W P-5'-P-CCNC: 15 U/L (ref 0–50)
ANION GAP SERPL CALCULATED.3IONS-SCNC: 3 MMOL/L (ref 3–14)
AST SERPL W P-5'-P-CCNC: 13 U/L (ref 0–45)
BILIRUB SERPL-MCNC: 0.3 MG/DL (ref 0.2–1.3)
BUN SERPL-MCNC: 13 MG/DL (ref 7–30)
CALCIUM SERPL-MCNC: 8.5 MG/DL (ref 8.5–10.1)
CHLORIDE SERPL-SCNC: 111 MMOL/L (ref 94–109)
CO2 SERPL-SCNC: 30 MMOL/L (ref 20–32)
CREAT SERPL-MCNC: 0.49 MG/DL (ref 0.52–1.04)
GFR SERPL CREATININE-BSD FRML MDRD: >90 ML/MIN/{1.73_M2}
GLUCOSE SERPL-MCNC: 80 MG/DL (ref 70–99)
POTASSIUM SERPL-SCNC: 3.4 MMOL/L (ref 3.4–5.3)
PROT SERPL-MCNC: 7.5 G/DL (ref 6.8–8.8)
SODIUM SERPL-SCNC: 144 MMOL/L (ref 133–144)
TSH SERPL DL<=0.005 MIU/L-ACNC: 1.14 MU/L (ref 0.4–4)

## 2019-12-31 RX ORDER — MIRTAZAPINE 15 MG/1
15 TABLET, FILM COATED ORAL AT BEDTIME
Qty: 30 TABLET | Refills: 3 | Status: SHIPPED | OUTPATIENT
Start: 2019-12-31 | End: 2021-06-16

## 2019-12-31 NOTE — TELEPHONE ENCOUNTER
Writer called pharmacy with clarification on prescription and gave ICD 10 numbers.      Radha Ku, CMA

## 2019-12-31 NOTE — TELEPHONE ENCOUNTER
Pharmacy faxing questions for provider in regards to mirtazapine (REMERON SOL-TAB) 15 MG ODT:    Did you mean to prescribe ODT.    Also what is ICD 10 Code? Need for insurance.

## 2019-12-31 NOTE — TELEPHONE ENCOUNTER
This writer attempted to contact Gricelda on 12/31/19 via [employer] .       Reason for call results and left message.      If patient calls back:   Registered Nurse called. Follow Triage Call workflow        Yulia Houston RN

## 2020-01-02 NOTE — TELEPHONE ENCOUNTER
returned call    Best number to reach caller: [phone]    Is it ok to leave a detailed message: YES         calling back with

## 2020-01-02 NOTE — TELEPHONE ENCOUNTER
Called patient with  # [phone].     We discussed her xray results and the need for the US and MRI to be performed. Phone number provided to patient to schedule.     Patient is also requesting her blood work results.   Please call patient back at:  [phone]    Routing to provider to please advise on what you would like to be said to patient and recommendations    Michelle Venegas RN

## 2020-01-02 NOTE — TELEPHONE ENCOUNTER
Writer called patient via  and informed her of normal test results.  Informed patient to call back if further questions/concerns arise.      Radha Ku, CMA

## 2020-01-02 NOTE — TELEPHONE ENCOUNTER
Labs for thyroid, kidney function, liver function, electrolytes, and urine were normal.  GREGORY Freeman CNP

## 2020-01-06 DIAGNOSIS — R63.0 APPETITE LOSS: ICD-10-CM

## 2020-01-06 PROCEDURE — 87338 HPYLORI STOOL AG IA: CPT | Performed by: NURSE PRACTITIONER

## 2020-01-07 ENCOUNTER — TELEPHONE (OUTPATIENT)
Dept: FAMILY MEDICINE | Facility: CLINIC | Age: 60
End: 2020-01-07

## 2020-01-07 DIAGNOSIS — A04.8 H. PYLORI INFECTION: Primary | ICD-10-CM

## 2020-01-07 LAB — H PYLORI AG STL QL IA: POSITIVE

## 2020-01-07 NOTE — TELEPHONE ENCOUNTER
Please call patient.  She was positive for a bacteria called h.pylori which is likely causing a lot of her abdominal symptoms.  I'd like her to come back to clinic to discuss treatment and to see how she is doing with the Remeron.  We can also follow up on her MRI and US at that time (which she is having done tomorrow).  Please assist her in making an appointment. Thanks!  Rosalinda

## 2020-01-07 NOTE — TELEPHONE ENCOUNTER
This writer attempted to contact patient on 01/07/20      Reason for call schedule apt and discuss results and left message.      If patient calls back:   Registered Nurse called. Follow Triage Call workflow        Michelle Venegas RN

## 2020-01-08 ENCOUNTER — MYC MEDICAL ADVICE (OUTPATIENT)
Dept: FAMILY MEDICINE | Facility: CLINIC | Age: 60
End: 2020-01-08

## 2020-01-08 ENCOUNTER — ANCILLARY PROCEDURE (OUTPATIENT)
Dept: ULTRASOUND IMAGING | Facility: CLINIC | Age: 60
End: 2020-01-08
Attending: NURSE PRACTITIONER
Payer: COMMERCIAL

## 2020-01-08 DIAGNOSIS — N83.202 LEFT OVARIAN CYST: ICD-10-CM

## 2020-01-08 PROCEDURE — 76830 TRANSVAGINAL US NON-OB: CPT

## 2020-01-08 PROCEDURE — 76856 US EXAM PELVIC COMPLETE: CPT | Mod: 59

## 2020-01-08 NOTE — TELEPHONE ENCOUNTER
Reason for Call:  Patient called back    Detailed comments: sent to triage, and patient phone has been updated    Phone Number Patient can be reached at: Home number on file [phone] (home)    Best Time: any    Can we leave a detailed message on this number? YES    Call taken on 1/8/2020 at 3:31 PM by Daisy Asif

## 2020-01-08 NOTE — TELEPHONE ENCOUNTER
This writer attempted to contact Youa on 01/08/20      Reason for call results, provider message and unable to leave message. Both phone numbers listed in chart are not working or not letting call go through.  **[employer] message was sent to patient asking her or a family member to call the office to speak about lab result. Appears someone does check her MyC messages frequently.      If patient calls back:   Registered Nurse called. Follow Triage Call workflow    **Call center, please also verify any and all contact numbers in chart for patient. Numbers listed are not working right now.        Miranda Montez RN

## 2020-01-08 NOTE — TELEPHONE ENCOUNTER
Daughter called back and was given message with results. Patient scheduled with Rosalinda for follow up.   Next 5 appointments (look out 90 days)    Jan 09, 2020 11:20 AM CST  [facility] C[facility] [address] [place] [phone]  [phone]        See [place] RN, BSN, PHN

## 2020-01-09 ENCOUNTER — OFFICE VISIT (OUTPATIENT)
Dept: FAMILY MEDICINE | Facility: CLINIC | Age: 60
End: 2020-01-09
Payer: COMMERCIAL

## 2020-01-09 VITALS
TEMPERATURE: 98.2 F | WEIGHT: 80.6 LBS | BODY MASS INDEX: 18.65 KG/M2 | HEART RATE: 64 BPM | OXYGEN SATURATION: 99 % | DIASTOLIC BLOOD PRESSURE: 66 MMHG | HEIGHT: 55 IN | SYSTOLIC BLOOD PRESSURE: 114 MMHG

## 2020-01-09 DIAGNOSIS — R93.89 ABNORMAL PELVIC ULTRASOUND: ICD-10-CM

## 2020-01-09 DIAGNOSIS — A04.8 H. PYLORI INFECTION: ICD-10-CM

## 2020-01-09 DIAGNOSIS — N83.202 CYSTS OF BOTH OVARIES: Primary | ICD-10-CM

## 2020-01-09 DIAGNOSIS — N83.201 CYSTS OF BOTH OVARIES: Primary | ICD-10-CM

## 2020-01-09 DIAGNOSIS — F40.240 CLAUSTROPHOBIA: ICD-10-CM

## 2020-01-09 DIAGNOSIS — F33.1 MODERATE EPISODE OF RECURRENT MAJOR DEPRESSIVE DISORDER (H): ICD-10-CM

## 2020-01-09 PROCEDURE — 99214 OFFICE O/P EST MOD 30 MIN: CPT | Performed by: NURSE PRACTITIONER

## 2020-01-09 RX ORDER — AMOXICILLIN 500 MG/1
1000 CAPSULE ORAL 2 TIMES DAILY
Qty: 56 CAPSULE | Refills: 0 | Status: SHIPPED | OUTPATIENT
Start: 2020-01-09 | End: 2020-01-23

## 2020-01-09 RX ORDER — CLARITHROMYCIN 500 MG
500 TABLET ORAL 2 TIMES DAILY
Qty: 28 TABLET | Refills: 0 | Status: SHIPPED | OUTPATIENT
Start: 2020-01-09 | End: 2020-01-23

## 2020-01-09 RX ORDER — LORAZEPAM 0.5 MG/1
TABLET ORAL
Qty: 4 TABLET | Refills: 0 | Status: SHIPPED | OUTPATIENT
Start: 2020-01-09 | End: 2021-06-16

## 2020-01-09 ASSESSMENT — PAIN SCALES - GENERAL: PAINLEVEL: MODERATE PAIN (5)

## 2020-01-09 ASSESSMENT — MIFFLIN-ST. JEOR: SCORE: 771.85

## 2020-01-09 NOTE — PROGRESS NOTES
Subjective     Gricelda Navarro is a 59 year old female who presents to clinic today for the following health issues:    HPI   Follow up for ultrasound results    Got claustrophobic in MRI machine when going for the pelvic bone MRI to evaluate the bone island seen on xray of the pelvis. Was unable to complete testing.    She is concerned about the right lower pelvic pain.  She also mentions that she sees blood in urine after exerting herself (ie: with shoveling)  Saw urology in January and 2019.  Cytology was negative 2019 and exam normal in 2019.  Was told to follow up if hematuria occurred again.  She was told to complete pelvic floor physical therapy for the pain at the time.    Patient Active Problem List   Diagnosis     Recurrent cold sores     Gastritis     Major depression     Generalized anxiety disorder     Seasonal allergic rhinitis     Iron deficiency anemia     CARDIOVASCULAR SCREENING; LDL GOAL LESS THAN 160     Vitamin D deficiency     External hemorrhoids     Internal hemorrhoids     Pelvic floor dysfunction     RLQ abdominal pain     Nephrolithiasis     Left ovarian cyst     Liver masses     Multinodular thyroid     H. pylori infection     Moderate episode of recurrent major depressive disorder (H)     Abnormal pelvic ultrasound     Cysts of both ovaries     Past Surgical History:   Procedure Laterality Date     C/SECTION, LOW TRANSVERSE      , Low Transverse     COLONOSCOPY WITH CO2 INSUFFLATION N/A 2017    Procedure: COLONOSCOPY WITH CO2 INSUFFLATION;  colonoscopy/Dr. Destiney Cuba/Rectal mass [K62.9]  - Primary /BMi: [facility]: [phone];  Surgeon: John Aguilar MD;  Location: [facility]     TUBAL LIGATION         Social History     Tobacco Use     Smoking status: Never Smoker     Smokeless tobacco: Never Used   Substance Use Topics     Alcohol use: No     Family History   Problem Relation Age of Onset     Unknown/Adopted Mother      Unknown/Adopted Father       "    Current Outpatient Medications   Medication Sig Dispense Refill     amoxicillin (AMOXIL) 500 MG capsule Take 2 capsules (1,000 mg) by mouth 2 times daily for 14 days 56 capsule 0     clarithromycin (BIAXIN) 500 MG tablet Take 1 tablet (500 mg) by mouth 2 times daily for 14 days 28 tablet 0     Ferrous Fumarate 325 (106 Fe) MG TABS Take 1 tablet by mouth 2 times daily       LORazepam (ATIVAN) 0.5 MG tablet Take 1-2 tablet 1 hour prior to MRI, repeat in 30 minutes if needed. 4 tablet 0     omeprazole (PRILOSEC) 20 MG DR capsule Take 1 capsule (20 mg) by mouth 2 times daily for 14 days 28 capsule 0     order for DME Drink one daily.    Equipment being ordered: ensure complete 30 Can 3     cetirizine (ZYRTEC) 10 MG tablet Take 1 tablet (10 mg) by mouth daily (Patient not taking: Reported on 1/9/2020) 90 tablet 3     mirtazapine (REMERON) 15 MG tablet Take 1 tablet (15 mg) by mouth At Bedtime (Patient not taking: Reported on 1/9/2020) 30 tablet 3     Allergies   Allergen Reactions     Cortisone Itching, Swelling and Rash     Cyclobenzaprine Rash     Gabapentin Itching     Hydrocodone Itching     Prednisone Hives     BP Readings from Last 3 Encounters:   01/09/20 114/66   12/30/19 119/65   10/17/19 107/62    Wt Readings from Last 3 Encounters:   01/09/20 36.6 kg (80 lb 9.6 oz)   12/30/19 37.6 kg (83 lb)   10/17/19 36.1 kg (79 lb 9.6 oz)                 Reviewed and updated as needed this visit by Provider         Review of Systems   ROS COMP: Constitutional, HEENT, cardiovascular, pulmonary, GI, , musculoskeletal, neuro, skin, endocrine and psych systems are negative, except as otherwise noted.      Objective    /66 (BP Location: Left arm, Patient Position: Chair, Cuff Size: Adult Regular)   Pulse 64   Temp 98.2  F (36.8  C) (Oral)   Ht 1.372 m (4' 6\")   Wt 36.6 kg (80 lb 9.6 oz)   LMP 12/25/2013   SpO2 99%   BMI 19.43 kg/m    Body mass index is 19.43 kg/m .  Physical Exam   GENERAL: healthy, alert and " no distress  MS: no gross musculoskeletal defects noted, no edema  PSYCH: mentation appears normal, affect normal/bright    Diagnostic Test Results:  No results found for any visits on 01/09/20.     Component      Latest Ref Rng & Units 12/30/2019 1/6/2020   Sodium      133 - 144 mmol/L 144    Potassium      3.4 - 5.3 mmol/L 3.4    Chloride      94 - 109 mmol/L 111 (H)    Carbon Dioxide      20 - 32 mmol/L 30    Anion Gap      3 - 14 mmol/L 3    Glucose      70 - 99 mg/dL 80    Urea Nitrogen      7 - 30 mg/dL 13    Creatinine      0.52 - 1.04 mg/dL 0.49 (L)    GFR Estimate      >60 mL/min/1.73:m2 >90    GFR Estimate If Black      >60 mL/min/1.73:m2 >90    Calcium      8.5 - 10.1 mg/dL 8.5    Bilirubin Total      0.2 - 1.3 mg/dL 0.3    Albumin      3.4 - 5.0 g/dL 3.6    Protein Total      6.8 - 8.8 g/dL 7.5    Alkaline Phosphatase      40 - 150 U/L 92    ALT      0 - 50 U/L 15    AST      0 - 45 U/L 13    Color Urine       Yellow    Appearance Urine       Clear    Glucose Urine      NEG:Negative mg/dL Negative    Bilirubin Urine      NEG:Negative Negative    Ketones Urine      NEG:Negative mg/dL Negative    Specific Gravity Urine      1.003 - 1.035 1.020    Blood Urine      NEG:Negative Trace (A)    pH Urine      5.0 - 7.0 pH 7.0    Protein Albumin Urine      NEG:Negative mg/dL Negative    Urobilinogen Urine      0.2 - 1.0 EU/dL 0.2    Nitrite Urine      NEG:Negative Negative    Leukocyte Esterase Urine      NEG:Negative Negative    Source       Midstream Urine    WBC Urine      OTO5:0 - 5 /HPF 0 - 5    RBC Urine      OTO2:O - 2 /HPF O - 2    Squamous Epithelial /LPF Urine      FEW:Few /LPF Few    Bacteria Urine      NEG:Negative /HPF Few (A)    TSH      0.40 - 4.00 mU/L 1.14    Helicobacter pylori Antigen Stool      NEG:Negative  Positive (A)         Pelvic Ultrasound-transabdominal and transvaginal     Comparisons: None     History: Follow-up ovarian cyst     Findings: The uterus measures 6.9 x 3.0 x 5.3cm. The  endometrial  stripe measures 2 mm thick.     The right and left ovaries measure 2.0 x 1.0 x 1.5 cm and 4.6 x 2.0 x  3.2 cm, respectively. There are at least 2 simple appearing cysts in  the right ovary measuring up to 10 mm. There is a at least one  shadowing calcification in the right ovary. There are at least 3 cysts  in the left ovary measuring up to 2.8 cm with thin septations. There  is at least one coarse calcification with soft tissue component in the  left ovary. Both ovaries demonstrate normal blood flow.                                                                      Impression: Cystic lesions, mostly simple, with coarse calcifications  and small soft tissue components in both ovaries are unusual in this  59-year-old postmenopausal patient. Consider pelvic MRI for further  evaluation.     BHARGAV FORD MD  Assessment & Plan     1. Cysts of both ovaries  Will order below in addition to the pelvis bone MRI.   - MR Pelvis (GYN) wo & w Contrast; Future    2. Abnormal pelvic ultrasound  - MR Pelvis (GYN) wo & w Contrast; Future    3. H. pylori infection  Discussed treatment today in detail.  As per patient instructions.    - clarithromycin (BIAXIN) 500 MG tablet; Take 1 tablet (500 mg) by mouth 2 times daily for 14 days  Dispense: 28 tablet; Refill: 0  - amoxicillin (AMOXIL) 500 MG capsule; Take 2 capsules (1,000 mg) by mouth 2 times daily for 14 days  Dispense: 56 capsule; Refill: 0  - omeprazole (PRILOSEC) 20 MG DR capsule; Take 1 capsule (20 mg) by mouth 2 times daily for 14 days  Dispense: 28 capsule; Refill: 0    4. Moderate episode of recurrent major depressive disorder (H)  Continue remeron- too soon to tell per patient if it is helping with this or her appetite.     5. Claustrophobia  For MRI  - LORazepam (ATIVAN) 0.5 MG tablet; Take 1-2 tablet 1 hour prior to MRI, repeat in 30 minutes if needed.  Dispense: 4 tablet; Refill: 0       Patient Instructions   Take Ativan as prescribed prior to  MRI.    Schedule at [phone]    We will follow up on results.    You are positive for the h.pylori infection which is likely causing a lot of your stomach problems.  H.pylori is a bacteria that a lot of people have in their stomachs.  It is common in the [place] and [place].  It could come from contaminated water supply but we are not totally sure where it comes from.  The risk of H.pylori is that is can cause gastric ulcers and gastric cancer in the future.  The good news is that we can treat it with antibiotics/antacids.  The treatment is as follows:      -Clarithromycin 1 capsule twice a day  -Amoxicillin 2 capsules twice a day  -Omeprazole 1 capsule twice a day    Take all of those exactly as prescribed for 14 days.  Your symptoms should improve.  If you still have symptoms 2-3 weeks after treatment, let us know and we can consider retesting and/or doing an endoscopy to evaluate the condition of your stomach lining.    For endocrine:  For scheduling in the [place] ([facility], and [place]) call [phone] or [phone]       For scheduling at [facility] ([facility] call [phone] or [phone]       For scheduling in the [place] ([facility]) call [phone]  or  [phone]       Return in about 2 weeks (around 1/23/2020) for Follow Up.    GREGORY Freeamn [facility]

## 2020-01-09 NOTE — PATIENT INSTRUCTIONS
Take Ativan as prescribed prior to MRI.    Schedule at [phone]    We will follow up on results.    You are positive for the h.pylori infection which is likely causing a lot of your stomach problems.  H.pylori is a bacteria that a lot of people have in their stomachs.  It is common in the [place] and [place].  It could come from contaminated water supply but we are not totally sure where it comes from.  The risk of H.pylori is that is can cause gastric ulcers and gastric cancer in the future.  The good news is that we can treat it with antibiotics/antacids.  The treatment is as follows:      -Clarithromycin 1 capsule twice a day  -Amoxicillin 2 capsules twice a day  -Omeprazole 1 capsule twice a day    Take all of those exactly as prescribed for 14 days.  Your symptoms should improve.  If you still have symptoms 2-3 weeks after treatment, let us know and we can consider retesting and/or doing an endoscopy to evaluate the condition of your stomach lining.    For endocrine:  For scheduling in the [place] ([place], [facility], and [place]) call [phone] or [phone]       For scheduling at [facility] ([facility]), call [phone] or [phone]       For scheduling in the South ([facility]) call [phone]  or  [phone]

## 2020-01-09 NOTE — TELEPHONE ENCOUNTER
Attempted to reach patient via [employer] . Patient's daughter Андрей answered ( no consent to communicate on file) she confirmed patient's contact number #[phone]. She also notes patient is coming in for appointment today with Rosalinda.     Attempted to contact patient's phone number and left voice mail.     FYI to provider. Patient has appt today at 11:15 am. We were unable to get a hold of her by phone to give her provider's message, below.     Yulia Houston RN  [facility]

## 2020-01-14 ENCOUNTER — TELEPHONE (OUTPATIENT)
Dept: FAMILY MEDICINE | Facility: CLINIC | Age: 60
End: 2020-01-14

## 2020-01-14 NOTE — TELEPHONE ENCOUNTER
Reason for Call:  Other requesting medication    Detailed comments: Patient is calling because she is scheduled for an MRI, however she is requesting rx Ativan    Phone Number Patient can be reached at: Cell number on file:    Telephone Information:   Mobile [phone]       Best Time: any    Can we leave a detailed message on this number? YES    Call taken on 1/14/2020 at 2:49 PM by Daisy Asif

## 2020-01-14 NOTE — TELEPHONE ENCOUNTER
Called # and message states to dial a 1 or 0, I called with those digits in front of #. Unable to leave a message. Need to update phone #s in chart. Postponing message for tomorrow and will try to call again.  Harini Askew MA  [facility]  2nd Floor  Primary Care

## 2020-01-15 ENCOUNTER — OFFICE VISIT (OUTPATIENT)
Dept: ENDOCRINOLOGY | Facility: CLINIC | Age: 60
End: 2020-01-15
Payer: COMMERCIAL

## 2020-01-15 VITALS
DIASTOLIC BLOOD PRESSURE: 61 MMHG | WEIGHT: 83 LBS | RESPIRATION RATE: 14 BRPM | BODY MASS INDEX: 19.21 KG/M2 | SYSTOLIC BLOOD PRESSURE: 109 MMHG | OXYGEN SATURATION: 100 % | HEIGHT: 55 IN | HEART RATE: 76 BPM | TEMPERATURE: 97.3 F

## 2020-01-15 DIAGNOSIS — E04.2 MULTINODULAR THYROID: Primary | ICD-10-CM

## 2020-01-15 PROCEDURE — 99214 OFFICE O/P EST MOD 30 MIN: CPT | Performed by: INTERNAL MEDICINE

## 2020-01-15 ASSESSMENT — MIFFLIN-ST. JEOR: SCORE: 782.74

## 2020-01-15 NOTE — PROGRESS NOTES
"S:  Patient is here for evaluation of thyroid nodules.   Seen with interpretor.     In 2016, she noticed enlargement of her thyroid but did not seek care until now.   Primary noted a nodule on exam and ordered an US.     She has been having a \"squeezing\" sensation in her neck for several years.   Worse with stress. She is very worried about the nodules and is wondering if there is a medication available to treat them.     She is also c/o fatigue and has been told she had \"low hemoglobin\". However, recent notation contradicts this. FIT testing has been ordered.   She was seen in the ER last month for atypical chest pain and was diagnosed as having gastritis.     Here today for follow up and has more questions regarding thyroid nodules.     ROS: 10 point ROS neg other than the symptoms noted above in the HPI.    Exam:   Vital signs:  Temp: 97.3  F (36.3  C) Temp src: Oral BP: 109/61 Pulse: 76   Resp: 14 SpO2: 100 %     Height: 137.2 cm (4' 6\") Weight: 37.6 kg (83 lb)  Estimated body mass index is 20.01 kg/m  as calculated from the following:    Height as of this encounter: 1.372 m (4' 6\").    Weight as of this encounter: 37.6 kg (83 lb).  Gen: In NAD.   HEENT: no proptosis or lid lag, EOMI, MMM.     A/P:   Thyroid nodules - I have reviewed the natural history of thyroid nodules and thyroid cancer with the patient. TSH normal. Images reviewed. She is convinced the nodules are the root of all her problems. I explained that her thyroid function is normal and nodules would not explain her lack of appetite or fatigue. Repeatedly emphasized that if nodules are not cancerous there is no need for surgery and I think if she pursues surgery on her own, she is not likely to have relief of her symptoms. Explained levothyroxine suppression therapy does not work well if at all. Her TSH is already low normal to begin with.   FNA right #1 on 11/29/19 was benign.  FNA left #4 on 11/29/19 was AUS. Sent for Afirma which was benign. "   She is concerned these might become a problem in the future. Extensive conversation on the natural history of thyroid nodules. Discussed surgery and levothyroxine suppression again. She is mainly worried about the potential for future growth of the nodules. Offered increased US monitoring.   -Labs and ultrasound in 3 months.   -See me in clinic after.     I spent 25 minutes with the patient. Greater than 50% of the time spent in . Risks/benefits/alternatives reviewed.     Eric Holden MD on 1/15/2020 at 11:04 AM

## 2020-01-15 NOTE — NURSING NOTE
"Chief Complaint   Patient presents with     RECHECK     thyroid - go over results       Initial /61 (BP Location: Right arm, Patient Position: Sitting, Cuff Size: Adult Regular)   Pulse 76   Temp 97.3  F (36.3  C) (Oral)   Resp 14   Ht 1.372 m (4' 6\")   Wt 37.6 kg (83 lb)   LMP 12/25/2013   SpO2 100%   BMI 20.01 kg/m   Estimated body mass index is 20.01 kg/m  as calculated from the following:    Height as of this encounter: 1.372 m (4' 6\").    Weight as of this encounter: 37.6 kg (83 lb).  BP completed using cuff size: small regular  Medications and allergies reviewed.      Amelia EAST MA    "

## 2020-01-15 NOTE — LETTER
"    1/15/2020         RE: Gricelda Navarro  [address] [place] [address]        Dear Colleague,    Thank you for referring your patient, Gricelda Navarro, to the [facility]. Please see a copy of my visit note below.    S:  Patient is here for evaluation of thyroid nodules.   Seen with interpretor.     In 2016, she noticed enlargement of her thyroid but did not seek care until now.   Primary noted a nodule on exam and ordered an US.     She has been having a \"squeezing\" sensation in her neck for several years.   Worse with stress. She is very worried about the nodules and is wondering if there is a medication available to treat them.     She is also c/o fatigue and has been told she had \"low hemoglobin\". However, recent notation contradicts this. FIT testing has been ordered.   She was seen in the ER last month for atypical chest pain and was diagnosed as having gastritis.     Here today for follow up and has more questions regarding thyroid nodules.     ROS: 10 point ROS neg other than the symptoms noted above in the HPI.    Exam:   Vital signs:  Temp: 97.3  F (36.3  C) Temp src: Oral BP: 109/61 Pulse: 76   Resp: 14 SpO2: 100 %     Height: 137.2 cm (4' 6\") Weight: 37.6 kg (83 lb)  Estimated body mass index is 20.01 kg/m  as calculated from the following:    Height as of this encounter: 1.372 m (4' 6\").    Weight as of this encounter: 37.6 kg (83 lb).  Gen: In NAD.   HEENT: no proptosis or lid lag, EOMI, MMM.     A/P:   Thyroid nodules - I have reviewed the natural history of thyroid nodules and thyroid cancer with the patient. TSH normal. Images reviewed. She is convinced the nodules are the root of all her problems. I explained that her thyroid function is normal and nodules would not explain her lack of appetite or fatigue. Repeatedly emphasized that if nodules are not cancerous there is no need for surgery and I think if she pursues surgery on her own, she is not likely to have relief of her " symptoms. Explained levothyroxine suppression therapy does not work well if at all. Her TSH is already low normal to begin with.   FNA right #1 on 11/29/19 was benign.  FNA left #4 on 11/29/19 was AUS. Sent for Afirma which was benign.   She is concerned these might become a problem in the future. Extensive conversation on the natural history of thyroid nodules. Discussed surgery and levothyroxine suppression again. She is mainly worried about the potential for future growth of the nodules. Offered increased US monitoring.   -Labs and ultrasound in 3 months.   -See me in clinic after.     I spent 25 minutes with the patient. Greater than 50% of the time spent in . Risks/benefits/alternatives reviewed.     Eric Holden MD on 1/15/2020 at 11:04 AM        Again, thank you for allowing me to participate in the care of your patient.        Sincerely,        Eric Holden MD

## 2020-01-20 ENCOUNTER — TELEPHONE (OUTPATIENT)
Dept: FAMILY MEDICINE | Facility: CLINIC | Age: 60
End: 2020-01-20

## 2020-01-20 ENCOUNTER — ANCILLARY PROCEDURE (OUTPATIENT)
Dept: MRI IMAGING | Facility: CLINIC | Age: 60
End: 2020-01-20
Attending: NURSE PRACTITIONER
Payer: COMMERCIAL

## 2020-01-20 DIAGNOSIS — N83.202 CYSTS OF BOTH OVARIES: Primary | ICD-10-CM

## 2020-01-20 DIAGNOSIS — N83.201 CYSTS OF BOTH OVARIES: Primary | ICD-10-CM

## 2020-01-20 DIAGNOSIS — N83.202 CYSTS OF BOTH OVARIES: ICD-10-CM

## 2020-01-20 DIAGNOSIS — N83.201 CYSTS OF BOTH OVARIES: ICD-10-CM

## 2020-01-20 DIAGNOSIS — R93.5 ABNORMAL MRI, PELVIS: ICD-10-CM

## 2020-01-20 DIAGNOSIS — R93.89 ABNORMAL PELVIC ULTRASOUND: ICD-10-CM

## 2020-01-20 PROCEDURE — 72197 MRI PELVIS W/O & W/DYE: CPT | Performed by: RADIOLOGY

## 2020-01-20 PROCEDURE — A9585 GADOBUTROL INJECTION: HCPCS | Performed by: NURSE PRACTITIONER

## 2020-01-20 RX ORDER — GADOBUTROL 604.72 MG/ML
7.5 INJECTION INTRAVENOUS ONCE
Status: COMPLETED | OUTPATIENT
Start: 2020-01-20 | End: 2020-01-20

## 2020-01-20 RX ADMIN — GADOBUTROL 6 ML: 604.72 INJECTION INTRAVENOUS at 08:47

## 2020-01-20 NOTE — TELEPHONE ENCOUNTER
Please call patient.  Her MRI shows abnormal ovarian cysts concerning for malignancy.  Needs to see gynecology.  Please have her call [phone] to schedule as soon as able.  Thank you,  GREGORY Freeman CNP

## 2020-01-20 NOTE — TELEPHONE ENCOUNTER
This writer attempted to contact patient with [employer] on 01/20/20      Reason for call results/orders/recommendations and left message.      If patient calls back:   Registered Nurse called. Follow Triage Call workflow        Michelle Huggins RN

## 2020-01-21 NOTE — TELEPHONE ENCOUNTER
Patients tomas called into the clinic and asked for me to call her mom (Gricelda) at [phone] with an . Connected with  # [phone] and called patient. Results were discussed as well as the referral.     Offered to stay on the line with  services to schedule her f/u apt. Her daughter states that she will be calling to schedule the apt for Gricelda and does not need the added services.     Gricelda states that she has no further questions at this time and will follow up with ob/gyn for further evaluation.     Michelle Huggins RN  [place]

## 2020-01-21 NOTE — TELEPHONE ENCOUNTER
This writer attempted to contact patient with [employer] on 01/21/20 with alternate numbers provided.       Reason for call results and left message.      If patient calls back:   Registered Nurse called. Follow Triage Call workflow        Michelle Huggins RN

## 2020-01-21 NOTE — TELEPHONE ENCOUNTER
This writer attempted to contact patient with [employer] on 01/21/20      Reason for call results and left message.      If patient calls back:   Registered Nurse called. Follow Triage Call workflow        Michelle Huggins RN

## 2020-01-23 ENCOUNTER — TELEPHONE (OUTPATIENT)
Dept: FAMILY MEDICINE | Facility: CLINIC | Age: 60
End: 2020-01-23

## 2020-01-23 NOTE — TELEPHONE ENCOUNTER
Patient was not feeling well with the Clarithromycin you gave her (1/9/20)   her 14 days should be up today- but states she has 10 left    Asking for a nurse call to advise on continuing medication if it makes her have stomach pain    [phone] ok to leave message

## 2020-01-23 NOTE — TELEPHONE ENCOUNTER
"Patient states that she didn't take the biaxin when she went to get the MRI. States that she has not been taking the Biaxin for the past four days.     Patient was having a \"stabbing feeling\" in her stomach.     Current stomach pain level is at a 9-10. Patient states that she is bearing with it, dealing with it.     The Biaxin was helping patient to use the bathroom and have a BM, but Youa states that the medication tastes too bitter and causes her stomach pain, so she is not going to be taking it anymore.     Patient also states that she is almost out of the omeprazole.     Gricelda would like to know what recommendations are for her now.       Routing to provider to please advise.     [facility] [place]    "

## 2020-01-27 NOTE — TELEPHONE ENCOUNTER
Gave patient information and she said she will have her child call back to schedule appointment via [facility] .      See Cent[place] RN, BSN, PHN

## 2020-01-27 NOTE — TELEPHONE ENCOUNTER
She needs to schedule another visit to discuss other treatment options.    Marycarmen Robert M.D.

## 2020-01-28 ENCOUNTER — OFFICE VISIT (OUTPATIENT)
Dept: FAMILY MEDICINE | Facility: CLINIC | Age: 60
End: 2020-01-28
Payer: COMMERCIAL

## 2020-01-28 VITALS
TEMPERATURE: 98.1 F | OXYGEN SATURATION: 99 % | DIASTOLIC BLOOD PRESSURE: 58 MMHG | HEART RATE: 67 BPM | WEIGHT: 83.5 LBS | RESPIRATION RATE: 16 BRPM | SYSTOLIC BLOOD PRESSURE: 100 MMHG | BODY MASS INDEX: 20.13 KG/M2

## 2020-01-28 DIAGNOSIS — N83.202 LEFT OVARIAN CYST: ICD-10-CM

## 2020-01-28 DIAGNOSIS — A04.8 H. PYLORI INFECTION: Primary | ICD-10-CM

## 2020-01-28 DIAGNOSIS — N83.201 CYSTS OF BOTH OVARIES: ICD-10-CM

## 2020-01-28 DIAGNOSIS — N83.202 CYSTS OF BOTH OVARIES: ICD-10-CM

## 2020-01-28 DIAGNOSIS — R93.5 ABNORMAL MRI, PELVIS: ICD-10-CM

## 2020-01-28 LAB
BASOPHILS # BLD AUTO: 0 10E9/L (ref 0–0.2)
BASOPHILS NFR BLD AUTO: 0 %
CANCER AG125 SERPL-ACNC: <5 U/ML (ref 0–30)
DIFFERENTIAL METHOD BLD: NORMAL
EOSINOPHIL # BLD AUTO: 0 10E9/L (ref 0–0.7)
EOSINOPHIL NFR BLD AUTO: 0.7 %
ERYTHROCYTE [DISTWIDTH] IN BLOOD BY AUTOMATED COUNT: 13.2 % (ref 10–15)
HCT VFR BLD AUTO: 35.5 % (ref 35–47)
HGB BLD-MCNC: 11.7 G/DL (ref 11.7–15.7)
LYMPHOCYTES # BLD AUTO: 1.7 10E9/L (ref 0.8–5.3)
LYMPHOCYTES NFR BLD AUTO: 30.1 %
MCH RBC QN AUTO: 28.7 PG (ref 26.5–33)
MCHC RBC AUTO-ENTMCNC: 33 G/DL (ref 31.5–36.5)
MCV RBC AUTO: 87 FL (ref 78–100)
MONOCYTES # BLD AUTO: 0.4 10E9/L (ref 0–1.3)
MONOCYTES NFR BLD AUTO: 6.9 %
NEUTROPHILS # BLD AUTO: 3.4 10E9/L (ref 1.6–8.3)
NEUTROPHILS NFR BLD AUTO: 62.3 %
PLATELET # BLD AUTO: 173 10E9/L (ref 150–450)
RBC # BLD AUTO: 4.07 10E12/L (ref 3.8–5.2)
WBC # BLD AUTO: 5.5 10E9/L (ref 4–11)

## 2020-01-28 PROCEDURE — 99213 OFFICE O/P EST LOW 20 MIN: CPT | Performed by: NURSE PRACTITIONER

## 2020-01-28 PROCEDURE — 36415 COLL VENOUS BLD VENIPUNCTURE: CPT | Performed by: NURSE PRACTITIONER

## 2020-01-28 PROCEDURE — 86304 IMMUNOASSAY TUMOR CA 125: CPT | Performed by: NURSE PRACTITIONER

## 2020-01-28 PROCEDURE — 85025 COMPLETE CBC W/AUTO DIFF WBC: CPT | Performed by: NURSE PRACTITIONER

## 2020-01-28 NOTE — PROGRESS NOTES
Subjective     Gricelda Navarro is a 59 year old female who presents to clinic today for the following health issues:    HPI   Medication Followup of Biaxin 500mg    Taking Medication as prescribed: NO-experiencing pain in side so stopped taking medication. Did 10 days of the medication    Side Effects:  Side pain    Medication Helping Symptoms:  NO   Started hpylori medication on 1/10 and pain started on  so she stopped taking antibiotics.  The pain has improved since stopping antibiotics.  Now it's tolerable.  Sees gyn in 2 days for abnormal pelvic MRI.    Denies abdominal pain currently.  Still has right lower pelvic pain.     Patient Active Problem List   Diagnosis     Recurrent cold sores     Gastritis     Major depression     Generalized anxiety disorder     Seasonal allergic rhinitis     Iron deficiency anemia     CARDIOVASCULAR SCREENING; LDL GOAL LESS THAN 160     Vitamin D deficiency     External hemorrhoids     Internal hemorrhoids     Pelvic floor dysfunction     RLQ abdominal pain     Nephrolithiasis     Left ovarian cyst     Liver masses     Multinodular thyroid     H. pylori infection     Moderate episode of recurrent major depressive disorder (H)     Abnormal pelvic ultrasound     Cysts of both ovaries     Abnormal MRI, pelvis     Past Surgical History:   Procedure Laterality Date     C/SECTION, LOW TRANSVERSE      , Low Transverse     COLONOSCOPY WITH CO2 INSUFFLATION N/A 2017    Procedure: COLONOSCOPY WITH CO2 INSUFFLATION;  colonoscopy/Dr. Destiney Cuba/Rectal mass [K62.9]  - Primary /[facility]: [facility]: [phone];  Surgeon: John Aguilar MD;  Location: [facility] OR     TUBAL LIGATION         Social History     Tobacco Use     Smoking status: Never Smoker     Smokeless tobacco: Never Used   Substance Use Topics     Alcohol use: No     Family History   Problem Relation Age of Onset     Unknown/Adopted Mother      Unknown/Adopted Father          Current Outpatient Medications    Medication Sig Dispense Refill     Ferrous Fumarate 325 (106 Fe) MG TABS Take 1 tablet by mouth 2 times daily       LORazepam (ATIVAN) 0.5 MG tablet Take 1-2 tablet 1 hour prior to MRI, repeat in 30 minutes if needed. 4 tablet 0     mirtazapine (REMERON) 15 MG tablet Take 1 tablet (15 mg) by mouth At Bedtime 30 tablet 3     order for DME Drink one daily.    Equipment being ordered: ensure complete 30 Can 3     cetirizine (ZYRTEC) 10 MG tablet Take 1 tablet (10 mg) by mouth daily (Patient not taking: Reported on 1/9/2020) 90 tablet 3     Allergies   Allergen Reactions     Cortisone Itching, Swelling and Rash     Cyclobenzaprine Rash     Gabapentin Itching     Hydrocodone Itching     Prednisone Hives     BP Readings from Last 3 Encounters:   01/28/20 100/58   01/15/20 109/61   01/09/20 114/66    Wt Readings from Last 3 Encounters:   01/28/20 37.9 kg (83 lb 8 oz)   01/15/20 37.6 kg (83 lb)   01/09/20 36.6 kg (80 lb 9.6 oz)               Reviewed and updated as needed this visit by Provider         Review of Systems   ROS COMP: Constitutional, HEENT, cardiovascular, pulmonary, gi and gu systems are negative, except as otherwise noted.      Objective    /58   Pulse 67   Temp 98.1  F (36.7  C) (Oral)   LMP 12/25/2013   There is no height or weight on file to calculate BMI.  Physical Exam   GENERAL: healthy, alert and no distress  MS: no gross musculoskeletal defects noted, no edema  PSYCH: mentation appears normal, affect normal/bright    Diagnostic Test Results:  No results found for this or any previous visit (from the past 24 hour(s)).        Assessment & Plan     1. H. pylori infection  Did not complete antibiotics but she did do 10 days.  She does not have pain symptoms currently.  We will retest in 6 weeks.  If still positive, plan for quadruple therapy.  - Helicobacter pylori Antigen Stool; Future      2. Left ovarian cyst  Has gynecology appointment in 2 days.  Left ovarian cysts concerning for  malignancy.   -   - CBC with platelets and differential    3. Cysts of both ovaries  -   - CBC with platelets and differential    4. Abnormal MRI, pelvis  -   - CBC with platelets and differential       Patient Instructions   6 weeks we can recheck stool sample      Return in about 6 weeks (around 3/10/2020) for bring back stool sample.    GREGORY Freeman [facility]

## 2020-01-30 ENCOUNTER — TELEPHONE (OUTPATIENT)
Dept: FAMILY MEDICINE | Facility: CLINIC | Age: 60
End: 2020-01-30

## 2020-01-30 ENCOUNTER — OFFICE VISIT (OUTPATIENT)
Dept: OBGYN | Facility: CLINIC | Age: 60
End: 2020-01-30
Attending: NURSE PRACTITIONER
Payer: COMMERCIAL

## 2020-01-30 VITALS
DIASTOLIC BLOOD PRESSURE: 61 MMHG | WEIGHT: 84.4 LBS | SYSTOLIC BLOOD PRESSURE: 108 MMHG | HEART RATE: 75 BPM | OXYGEN SATURATION: 99 % | BODY MASS INDEX: 20.35 KG/M2

## 2020-01-30 DIAGNOSIS — N94.10 DYSPAREUNIA IN FEMALE: ICD-10-CM

## 2020-01-30 DIAGNOSIS — R10.2 PELVIC PAIN IN FEMALE: ICD-10-CM

## 2020-01-30 DIAGNOSIS — N83.202 BILATERAL OVARIAN CYSTS: Primary | ICD-10-CM

## 2020-01-30 DIAGNOSIS — N83.201 BILATERAL OVARIAN CYSTS: Primary | ICD-10-CM

## 2020-01-30 PROCEDURE — 99215 OFFICE O/P EST HI 40 MIN: CPT | Performed by: OBSTETRICS & GYNECOLOGY

## 2020-01-30 NOTE — PROGRESS NOTES
Gricelda is a 59 year old  female .  I have been asked to see patient in consultation by Rosalinda Bradshaw NP, regarding RLQ pain and ovarian cysts.     and 2 daughters present.     The symptoms started 2018.  There was pain to the right side of hip.  Her past child she had a c/section.  Since that time she has had the pain.  When she bends over it hurts more.   In , she had her c/section for her last child.  She did not have pain starting at that time (although that is what noted above). It feels sharp, cutting like sensation.  It is always present, not always excrutiating pain.    If she works too hard, she has had blood in the urine.    She went to the ER and was given medications but she broke out and she stopped the medication.  She does not remember medication.    She has not used Tylenol or Ibuprofen.   Hot packs have been used, but the symptoms seem to worsen with heat.  When she carries heavy things it makes the pain worse.    She has also noted that she has pain with intercourse,  It is deep inside.  She also notes that if she turns in bed, there is pressure sensation.    She had a tubal ligation.    She also had the  performed.  We discussed that CA-125 is not a screening test for ovarian cancer, and that there are currently no good screening tests for ovarian cancer.  Discussed that many different conditions can elevate CA-125 including but not limited to colon or liver pathology or thyroid disease.  Discussed that some ovarian cancers do not have an elevated CA-125.  Reviewed signs/symptoms of ovary cancer, although they are often vague, can be bloating, pressure, or bladder/bowel changes.  Patient voiced understanding.    She has had multiple scans.      EXAMINATION: MR PELVIS (GYN) WO & W CONTRAST, 2020 8:57 AM  COMPARISON: Ultrasonography 2020  HISTORY: Adnexal mass, US complex or solid mass, follow up; Cysts of both ovaries; Cysts of both ovaries; Abnormal  pelvic ultrasound  TECHNIQUE: Multiplanar, multisequence imaging was obtained of the pelvis without and with intravenous contrast. Contrast dose: 6 ml gadavist  FINDINGS: 6.7 x 3.3 x 5.5 cm uterus. 4 mm thickness of the endometrium, within normal limits. No focal lesion in the uterus.  Regarding the ovaries:  Few cysts in the right ovary, largest measuring up to 1 cm. 5 and 7 mm cysts in the posterior aspect of the right ovary demonstrating T2 hypointense rim-like appearance, likely representing thin calcified wall. No suspicious nodular or septal enhancement on the post contrast series.  Multicystic appearance of the left ovary. Most notably, there is 3 x 2.3 cm complex cystic lesion demonstrating multiple T2 hypointense, T1 hypointense thin septations. No suspicious nodular or septal enhancement or septal thickening.  No free fluid or collection in the pelvis. No pelvic lymphadenopathy. Osseous structures are unremarkable.   IMPRESSION: Persistent bilateral ovarian cysts in the postmenopausal stage. The largest 3 cm cyst arising from the left ovary demonstrates some complex features suggestive of a cystic neoplasm, including multiple thin septations, although there is no suspicious nodular or septal enhancement to raise concern for aggressive malignancy. Few small cysts on the right demonstrate no suspicious features. Recommend OB/GYN consultation.     She has also had a colonoscopy and we reviewed the report.   Colonoscopy 11/20/2017  Findings:        The digital rectal exam findings include palpable rectal mass. This        feels like extrinsic compression from the low pelvis, pressing in on the        lower rectum. It is about 2 by 1.6 cm and located to the left of        midline, anterior to the rectum. Pertinent negatives include normal        sphincter tone.        The terminal ileum appeared normal.        Internal hemorrhoids were found during retroflexion. The hemorrhoids        were Grade II (internal  hemorrhoids that prolapse but reduce        spontaneously).        The exam was otherwise without abnormality.                                                        She had the following ultrasound and we reviewed the films and the report    1/8/20  8:10 AM [id number] [facility]    Pelvic Ultrasound-transabdominal and transvaginal  Comparisons: None  History: Follow-up ovarian cyst  Findings: The uterus measures 6.9 x 3.0 x 5.3cm. The endometrial stripe measures 2 mm thick.  The right and left ovaries measure 2.0 x 1.0 x 1.5 cm and 4.6 x 2.0 x 3.2 cm, respectively. There are at least 2 simple appearing cysts in the right ovary measuring up to 10 mm. There is a at least one shadowing calcification in the right ovary. There are at least 3 cysts in the left ovary measuring up to 2.8 cm with thin septations. There is at least one coarse calcification with soft tissue component in the left ovary. Both ovaries demonstrate normal blood flow.                                                               Impression: Cystic lesions, mostly simple, with coarse calcifications and small soft tissue components in both ovaries are unusual in this 59-year-old postmenopausal patient. Consider pelvic MRI for further evaluation.      Result Narrative   EXAM: CT SCAN OF THE ABDOMEN AND PELVIS WITHOUT INTRAVENOUS CONTRAST 4/6/2019 5:03 AM.  CLINICAL INFORMATION: Flank pain, recurrent stone disease suspected  TECHNIQUE: Preliminary thin-section axial unenhanced images were obtained through the abdomen and pelvis. Thin section axial images were obtained through the abdomen and pelvis. Multiplanar reformatted images were obtained.  COMPARISON:  [facility] CT scan dated  1/3/2019.  FINDINGS:.  LIVER: Multiple hepatic masses have not changed significantly in size or morphology as compared to the previous exam.  BILIARY TREE: Unremarkable.  PANCREAS: No abnormalities.  SPLEEN: The spleen demonstrates a normal  appearance.  ADRENALS: Unremarkable in appearance.  GENITOURINARY TRACT: Tiny bilobed calculus in the right kidney again demonstrated, is now located in the middle pole calyx. No other urinary tract calculi demonstrated. No hydronephrosis.  REPRODUCTIVE ORGANS: Complex left adnexal mass has not changed significantly in appearance as compared to the previous exam.  BOWEL: No bowel obstruction or bowel inflammation. Normal appendix.  MESENTERY: No mesenteric abnormality demonstrated.  PERITONEAL CAVITY: No free intraperitoneal air or fluid.  VASCULAR STRUCTURES: No vascular abnormalities demonstrated.  ANTERIOR ABDOMINAL WALL: Intact without abnormality.  BONES AND SOFT TISSUES: No evidence of fracture, dislocation, or significant soft tissue injury.  LUNG BASES: Lower lungs are clear. No pleural fluid.  OTHER FINDINGS: No other significant findings demonstrated.  IMPRESSION:  1.  Tiny nonobstructing right renal calculi. No evidence of ureteral calculi or hydronephrosis.  2.  Complex left adnexal mass, similar to the previous exam.  3.  Multiple hepatic masses, also similar to the previous exam.  4.  There is been no significant change as compared to the exam of 1/3/2019.       Component      Latest Ref Rng & Units 2020         0 - 30 U/mL <5       Past Medical History:   Diagnosis Date     Back pain      KELSIE (generalised anxiety disorder)      Gastritis      Iron deficiency anemia      Major depression      Recurrent cold sores      Seasonal allergic rhinitis        Past Surgical History:   Procedure Laterality Date     C/SECTION, LOW TRANSVERSE      , Low Transverse     COLONOSCOPY WITH CO2 INSUFFLATION N/A 2017    Procedure: COLONOSCOPY WITH CO2 INSUFFLATION;  colonoscopy/Dr. Destiney Cuba/Rectal mass [K62.9]  - [facility]: [facility]: [phone];  Surgeon: John Aguilar MD;  Location: [facility] OR     TUBAL LIGATION         OB History    Para Term  AB Living   8 7 0 0 1  6   SAB TAB Ectopic Multiple Live Births   1 0 0 0 7      # Outcome Date GA Lbr Wilmer/2nd Weight Sex Delivery Anes PTL Lv   8 Para      -SEC   MARLENE   7 Para         DEC   6 Para         MARLENE   5 Para         MARLENE   4 Para         MARLENE   3 Para         MARLENE   2 Para         MARLENE   1 SAB      SAB          Gynecological History         Patient's last menstrual period was 2013.     no STD/no PID/no IUD   no abnormal pap smear       see above HPI        Allergies   Allergen Reactions     Cortisone Itching, Swelling and Rash     Cyclobenzaprine Rash     Gabapentin Itching     Hydrocodone Itching     Prednisone Hives       Current Outpatient Medications   Medication Sig Dispense Refill     cetirizine (ZYRTEC) 10 MG tablet Take 1 tablet (10 mg) by mouth daily 90 tablet 3     Ferrous Fumarate 325 (106 Fe) MG TABS Take 1 tablet by mouth 2 times daily       order for DME Drink one daily.    Equipment being ordered: ensure complete 30 Can 3     LORazepam (ATIVAN) 0.5 MG tablet Take 1-2 tablet 1 hour prior to MRI, repeat in 30 minutes if needed. (Patient not taking: Reported on 2020) 4 tablet 0     mirtazapine (REMERON) 15 MG tablet Take 1 tablet (15 mg) by mouth At Bedtime 30 tablet 3       Social History     Socioeconomic History     Marital status:      Spouse name: Gricelda     Number of children: 6     Years of education: Not on file     Highest education level: Not on file   Occupational History     Not on file   Social Needs     Financial resource strain: Not on file     Food insecurity:     Worry: Not on file     Inability: Not on file     Transportation needs:     Medical: Not on file     Non-medical: Not on file   Tobacco Use     Smoking status: Never Smoker     Smokeless tobacco: Never Used   Substance and Sexual Activity     Alcohol use: No     Drug use: No     Sexual activity: Yes     Partners: Male     Birth control/protection: Female Surgical, Post-menopausal   Lifestyle      Physical activity:     Days per week: Not on file     Minutes per session: Not on file     Stress: Not on file   Relationships     Social connections:     Talks on phone: Not on file     Gets together: Not on file     Attends Voodoo service: Not on file     Active member of club or organization: Not on file     Attends meetings of clubs or organizations: Not on file     Relationship status: Not on file     Intimate partner violence:     Fear of current or ex partner: Not on file     Emotionally abused: Not on file     Physically abused: Not on file     Forced sexual activity: Not on file   Other Topics Concern     Parent/sibling w/ CABG, MI or angioplasty before 65F 55M? Not Asked   Social History Narrative     Not on file       Family History   Problem Relation Age of Onset     Unknown/Adopted Mother      Unknown/Adopted Father        Review of Systems:  10 point ROS of systems including Constitutional, Eyes, Respiratory, Cardiovascular, Gastroenterology, Genitourinary, Integumentary, Muscularskeletal, Psychiatric were all negative except for pertinent positives noted in my HPI and in the PMH.        EXAM:  /61 (BP Location: Right arm, Cuff Size: Adult Regular)   Pulse 75   Wt 38.3 kg (84 lb 6.4 oz)   LMP 12/25/2013   SpO2 99%   BMI 20.35 kg/m    Body mass index is 20.35 kg/m .  General:  WNWD female, NAD  Alert  Oriented x 3  Gait:  Normal  Skin:  Normal skin turgor  HEENT:  NC/AT, EOMI  Neck:  No masses noted, symmetrical  Lungs:  Good respiratory effort   Abdomen:  Non-tender, non-distended.  Pelvic exam:  Patient declines.   Extremities:  No clubbing, cyanosis or edema.         ASSESSMENT:  Bilateral ovarian cysts  Pelvic pain in female  Dyspareunia       PLAN:  She has been seeing PT, but only for one week.  I would consider giving the PT additional time before determining if beneficial or not.    We also reviewed the  as noted above and while it is in the low level, it does not mean there  isn't a precancerous or cancerous lesion.    We also reviewed the use of laparoscopy and possible bilateral salpingo oophorectomy, in light of the coarse calcifications seen.  The surgery options were discussed with her and her family.  The ACOG pamphlets were given to them and reviewed with them.  They voice there understanding and they will talk it over at home.      Time In room  1353  Time Out of room 1455  CT and review of records greater than 50%    Keo Mahmood MD

## 2020-01-31 NOTE — TELEPHONE ENCOUNTER
Notes recorded by Marsha Das MA on 1/30/2020 at 2:23 PM CST  This writer attempted to contact patient on 01/30/20      Reason for call result notes and left message.      If patient calls back:  Patient contacted by 2nd floor [facility] Care Team (MA/TC). Inform patient that someone from the team will contact them, document that pt called and route to care team.         Marsha Das MA    ------    Notes recorded by Rosalinda Bradshaw APRN CNP on 1/30/2020 at 7:04 AM CST  Please call patient to discuss lab results.  Complete blood count and cancer marker are both normal.  Please continue with plan to meet with gynecology today.  Thanks!  Rosalinda

## 2020-01-31 NOTE — TELEPHONE ENCOUNTER
Daughter called back and informed of negative results.    Maribel Knox RN, [facility] Triage

## 2020-02-18 NOTE — TELEPHONE ENCOUNTER
Patient had MRI on 1/20/2020.  Harini Askew MA  [facility]  2nd Floor  Primary Care

## 2020-03-02 ENCOUNTER — HEALTH MAINTENANCE LETTER (OUTPATIENT)
Age: 60
End: 2020-03-02

## 2020-06-01 ENCOUNTER — AMBULATORY - HEALTHEAST (OUTPATIENT)
Dept: SURGERY | Facility: AMBULATORY SURGERY CENTER | Age: 60
End: 2020-06-01

## 2020-06-01 DIAGNOSIS — Z11.59 ENCOUNTER FOR SCREENING FOR OTHER VIRAL DISEASES: ICD-10-CM

## 2020-06-02 ENCOUNTER — RECORDS - HEALTHEAST (OUTPATIENT)
Dept: ADMINISTRATIVE | Facility: OTHER | Age: 60
End: 2020-06-02

## 2020-06-03 NOTE — PATIENT INSTRUCTIONS
Based on your medical history and these are the current health maintenance or preventive care services that you are due for (some may have been done at this visit)  Health Maintenance Due   Topic Date Due     HEPATITIS C SCREENING  07/12/1979     ADVANCE DIRECTIVE PLANNING Q5 YRS  07/12/2016     MAMMO SCREEN Q2 YR (SYSTEM ASSIGNED)  08/19/2016     PHQ-9 Q6 MONTHS  04/27/2017     INFLUENZA VACCINE (SYSTEM ASSIGNED)  09/01/2017         At [facility], we strive to deliver an exceptional experience to you, every time we see you.    If you receive a survey in the mail, please send us back your thoughts. We really do value your feedback.    Your care team's suggested websites for health information:  Www.[facility].org : Up to date and easily searchable information on multiple topics.  Www.medlineplus.gov : medication info, interactive tutorials, watch real surgeries online  Www.familydoctor.org : good info from the Academy of Family Physicians  Www.cdc.gov : public health info, travel advisories, epidemics (H1N1)  Www.aap.org : children's health info, normal development, vaccinations  Www.health.[facility].[place].us : [place] dept of health, public health issues in [place], N1N1    How to contact your care team:   Team [facility]la[facility] [phone]         Pharmacy [phone]    Dr. Robert, Diana Izquierdo PA-C, Dr. Francisco, Cathy JENKINS CNP, Elena Villalta PA-C, Dr. Kemp, and GREGORY Blunt CNP    Team RN: Maribel      Clinic hours  M-Th 7 am-7 pm   Fri 7 am-5 pm.   Urgent care M-F 11 am-9 pm,   Sat/Sun 9 am-5 pm.  Pharmacy M-Th 8 am-8 pm Fri 8 am-6 pm  Sat/Sun 9 am-5 pm.     All password changes, disabled accounts, or ID changes in [employer] will be done by our Access Services Department.    If you need help with your account or password, call: [phone]. Clinic staff no longer has the ability to change passwords.      none/denies family hx

## 2020-11-25 ENCOUNTER — MYC REFILL (OUTPATIENT)
Dept: FAMILY MEDICINE | Facility: CLINIC | Age: 60
End: 2020-11-25

## 2020-11-25 DIAGNOSIS — L29.9 ITCHING: ICD-10-CM

## 2020-11-28 RX ORDER — CETIRIZINE HYDROCHLORIDE 10 MG/1
10 TABLET ORAL DAILY
Qty: 90 TABLET | Refills: 0 | Status: SHIPPED | OUTPATIENT
Start: 2020-11-28 | End: 2021-06-16

## 2020-11-28 NOTE — TELEPHONE ENCOUNTER
Prescription approved per [facility] Refill Protocol.  Yulia Houston RN  [facility]

## 2020-12-20 ENCOUNTER — HEALTH MAINTENANCE LETTER (OUTPATIENT)
Age: 60
End: 2020-12-20

## 2021-04-24 ENCOUNTER — HEALTH MAINTENANCE LETTER (OUTPATIENT)
Age: 61
End: 2021-04-24

## 2021-06-16 ENCOUNTER — OFFICE VISIT (OUTPATIENT)
Dept: FAMILY MEDICINE | Facility: CLINIC | Age: 61
End: 2021-06-16
Payer: COMMERCIAL

## 2021-06-16 VITALS
HEART RATE: 68 BPM | DIASTOLIC BLOOD PRESSURE: 62 MMHG | OXYGEN SATURATION: 97 % | TEMPERATURE: 97.2 F | WEIGHT: 86.2 LBS | BODY MASS INDEX: 20.78 KG/M2 | SYSTOLIC BLOOD PRESSURE: 101 MMHG

## 2021-06-16 DIAGNOSIS — E04.2 MULTINODULAR THYROID: ICD-10-CM

## 2021-06-16 DIAGNOSIS — Z86.39 HISTORY OF VITAMIN D DEFICIENCY: ICD-10-CM

## 2021-06-16 DIAGNOSIS — R31.0 GROSS HEMATURIA: ICD-10-CM

## 2021-06-16 DIAGNOSIS — Z59.9 FINANCIAL PROBLEMS: ICD-10-CM

## 2021-06-16 DIAGNOSIS — N83.201 CYSTS OF BOTH OVARIES: Primary | ICD-10-CM

## 2021-06-16 DIAGNOSIS — F33.1 MODERATE EPISODE OF RECURRENT MAJOR DEPRESSIVE DISORDER (H): ICD-10-CM

## 2021-06-16 DIAGNOSIS — Z86.2 HISTORY OF ANEMIA: ICD-10-CM

## 2021-06-16 DIAGNOSIS — N83.202 CYSTS OF BOTH OVARIES: Primary | ICD-10-CM

## 2021-06-16 DIAGNOSIS — L29.9 ITCHING: ICD-10-CM

## 2021-06-16 DIAGNOSIS — R93.5 ABNORMAL MRI, PELVIS: ICD-10-CM

## 2021-06-16 DIAGNOSIS — R63.0 LOSS OF APPETITE: ICD-10-CM

## 2021-06-16 LAB
ALBUMIN SERPL-MCNC: 3.6 G/DL (ref 3.4–5)
ALBUMIN UR-MCNC: NEGATIVE MG/DL
ALP SERPL-CCNC: 113 U/L (ref 40–150)
ALT SERPL W P-5'-P-CCNC: 17 U/L (ref 0–50)
ANION GAP SERPL CALCULATED.3IONS-SCNC: 3 MMOL/L (ref 3–14)
APPEARANCE UR: CLEAR
AST SERPL W P-5'-P-CCNC: 17 U/L (ref 0–45)
BACTERIA #/AREA URNS HPF: ABNORMAL /HPF
BASOPHILS # BLD AUTO: 0 10E9/L (ref 0–0.2)
BASOPHILS NFR BLD AUTO: 0.7 %
BILIRUB SERPL-MCNC: 0.3 MG/DL (ref 0.2–1.3)
BILIRUB UR QL STRIP: NEGATIVE
BUN SERPL-MCNC: 11 MG/DL (ref 7–30)
CALCIUM SERPL-MCNC: 8.6 MG/DL (ref 8.5–10.1)
CANCER AG125 SERPL-ACNC: <5 U/ML (ref 0–30)
CHLORIDE SERPL-SCNC: 108 MMOL/L (ref 94–109)
CO2 SERPL-SCNC: 29 MMOL/L (ref 20–32)
COLOR UR AUTO: YELLOW
CREAT SERPL-MCNC: 0.58 MG/DL (ref 0.52–1.04)
DIFFERENTIAL METHOD BLD: NORMAL
EOSINOPHIL # BLD AUTO: 0.1 10E9/L (ref 0–0.7)
EOSINOPHIL NFR BLD AUTO: 1.4 %
ERYTHROCYTE [DISTWIDTH] IN BLOOD BY AUTOMATED COUNT: 12.7 % (ref 10–15)
FERRITIN SERPL-MCNC: 119 NG/ML (ref 8–252)
GFR SERPL CREATININE-BSD FRML MDRD: >90 ML/MIN/{1.73_M2}
GLUCOSE SERPL-MCNC: 91 MG/DL (ref 70–99)
GLUCOSE UR STRIP-MCNC: NEGATIVE MG/DL
HCT VFR BLD AUTO: 36 % (ref 35–47)
HGB BLD-MCNC: 11.9 G/DL (ref 11.7–15.7)
HGB UR QL STRIP: ABNORMAL
IRON SATN MFR SERPL: 27 % (ref 15–46)
IRON SERPL-MCNC: 67 UG/DL (ref 35–180)
KETONES UR STRIP-MCNC: NEGATIVE MG/DL
LEUKOCYTE ESTERASE UR QL STRIP: NEGATIVE
LYMPHOCYTES # BLD AUTO: 1.4 10E9/L (ref 0.8–5.3)
LYMPHOCYTES NFR BLD AUTO: 32.6 %
MCH RBC QN AUTO: 28.2 PG (ref 26.5–33)
MCHC RBC AUTO-ENTMCNC: 33.1 G/DL (ref 31.5–36.5)
MCV RBC AUTO: 85 FL (ref 78–100)
MONOCYTES # BLD AUTO: 0.3 10E9/L (ref 0–1.3)
MONOCYTES NFR BLD AUTO: 7.1 %
NEUTROPHILS # BLD AUTO: 2.5 10E9/L (ref 1.6–8.3)
NEUTROPHILS NFR BLD AUTO: 58.2 %
NITRATE UR QL: NEGATIVE
NON-SQ EPI CELLS #/AREA URNS LPF: ABNORMAL /LPF
PH UR STRIP: 6.5 PH (ref 5–7)
PLATELET # BLD AUTO: 166 10E9/L (ref 150–450)
POTASSIUM SERPL-SCNC: 3.5 MMOL/L (ref 3.4–5.3)
PROT SERPL-MCNC: 7.6 G/DL (ref 6.8–8.8)
RBC # BLD AUTO: 4.22 10E12/L (ref 3.8–5.2)
RBC #/AREA URNS AUTO: ABNORMAL /HPF
SODIUM SERPL-SCNC: 140 MMOL/L (ref 133–144)
SOURCE: ABNORMAL
SP GR UR STRIP: 1.02 (ref 1–1.03)
TIBC SERPL-MCNC: 246 UG/DL (ref 240–430)
TSH SERPL DL<=0.005 MIU/L-ACNC: 1.53 MU/L (ref 0.4–4)
UROBILINOGEN UR STRIP-ACNC: 0.2 EU/DL (ref 0.2–1)
WBC # BLD AUTO: 4.4 10E9/L (ref 4–11)
WBC #/AREA URNS AUTO: ABNORMAL /HPF

## 2021-06-16 PROCEDURE — 36415 COLL VENOUS BLD VENIPUNCTURE: CPT | Performed by: NURSE PRACTITIONER

## 2021-06-16 PROCEDURE — 80050 GENERAL HEALTH PANEL: CPT | Performed by: NURSE PRACTITIONER

## 2021-06-16 PROCEDURE — 86304 IMMUNOASSAY TUMOR CA 125: CPT | Performed by: NURSE PRACTITIONER

## 2021-06-16 PROCEDURE — 81001 URINALYSIS AUTO W/SCOPE: CPT | Performed by: NURSE PRACTITIONER

## 2021-06-16 PROCEDURE — 82306 VITAMIN D 25 HYDROXY: CPT | Performed by: NURSE PRACTITIONER

## 2021-06-16 PROCEDURE — 83540 ASSAY OF IRON: CPT | Performed by: NURSE PRACTITIONER

## 2021-06-16 PROCEDURE — 99214 OFFICE O/P EST MOD 30 MIN: CPT | Performed by: NURSE PRACTITIONER

## 2021-06-16 PROCEDURE — 83550 IRON BINDING TEST: CPT | Performed by: NURSE PRACTITIONER

## 2021-06-16 PROCEDURE — 82728 ASSAY OF FERRITIN: CPT | Performed by: NURSE PRACTITIONER

## 2021-06-16 RX ORDER — CETIRIZINE HYDROCHLORIDE 10 MG/1
10 TABLET ORAL DAILY
Qty: 90 TABLET | Refills: 0 | Status: SHIPPED | OUTPATIENT
Start: 2021-06-16 | End: 2023-10-20

## 2021-06-16 SDOH — ECONOMIC STABILITY - INCOME SECURITY: PROBLEM RELATED TO HOUSING AND ECONOMIC CIRCUMSTANCES, UNSPECIFIED: Z59.9

## 2021-06-16 ASSESSMENT — PAIN SCALES - GENERAL: PAINLEVEL: NO PAIN (0)

## 2021-06-16 NOTE — PROGRESS NOTES
Assessment & Plan     Cysts of both ovaries  Never did follow up with gynecology.  Recommendation was laparoscopic surgery to remove ovaries.  Has some transportation and knowledge barriers.  Patient remembers the gynecologist saying everything was fine and no follow up was needed.  She does note she would like to see a different gynecologist.  I will refer her to Kamaljit.  Encouraged patient to talk to her children about the concern for ovarian cancer based on her imaging.  Discussed they would likely want to help her get the care she needed if they knew about these concerns.  She agrees.  Will also enter care coordinator referral for follow up   -   - OB/GYN REFERRAL  - CARE COORDINATION REFERRAL    Abnormal MRI, pelvis  As above.   -   - OB/GYN REFERRAL  - CARE COORDINATION REFERRAL    Gross hematuria  Still occurs with exertion.  Would ideally refer to urology but want patient to follow up with gyn first and too many referrals would be overwhelming.   - UA with Microscopic reflex to Culture    Loss of appetite  Chronic for a few years.  Weight stable.  Labs below.   - TSH with free T4 reflex  - Comprehensive metabolic panel (BMP + Alb, Alk Phos, ALT, AST, Total. Bili, TP)    Itching  Refilled.   - cetirizine (ZYRTEC) 10 MG tablet; Take 1 tablet (10 mg) by mouth daily    History of vitamin D deficiency  - Vitamin D Deficiency    History of anemia  - CBC with platelets and differential  - Ferritin  - Iron and iron binding capacity    Multinodular thyroid  - TSH with free T4 reflex    Moderate episode of recurrent major depressive disorder (H)  Patient states no problems currently     Financial problems  - CARE COORDINATION REFERRAL    Diagnosis or treatment significantly limited by social determinants of health - language, cultural barriers  30 minutes spent on the date of the encounter doing chart review, history and exam, documentation and further activities per the note       Patient Instructions    I recommend you see gynecology again about the cysts on your ovaries which were concerning for possible cancer.      Please see the attached referral.  [facility].        No follow-ups on file.    GREGORY [facility]  TASHIA [facility] [place] is a 60 year old who presents for the following health issues  accompanied by her daughter:    HPI   Wt Readings from Last 5 Encounters:   06/16/21 39.1 kg (86 lb 3.2 oz)   01/30/20 38.3 kg (84 lb 6.4 oz)   01/28/20 37.9 kg (83 lb 8 oz)   01/15/20 37.6 kg (83 lb)   01/09/20 36.6 kg (80 lb 9.6 oz)   wants refills on iron tabs- has been off for about a year.  Has been off of vitamin d    Lightheadedness and low appetite same as previously discussed  Feels better when on vitamin D and iron  No vaginal or urinary bleeding as long as she does not lift heavy objects. Does occur if she exerts herself.  Was supposed to follow up with gynecologist regarding bilateral ovarian cysts but did not.  States her children are busy but also admits they are unaware of the cysts on her ovaries.  Daughter is present today and states she cannot drive on the highway so wouldn't be able to take patient to any appointments.  She agrees to share the information with her siblings.     Patient denies pelvic pain, trouble urinating.      She also denies any issues with depression at this time.        Review of Systems   Constitutional, HEENT, cardiovascular, pulmonary, GI, , musculoskeletal, neuro, skin, endocrine and psych systems are negative, except as otherwise noted.      Objective    /62 (BP Location: Left arm, Patient Position: Chair, Cuff Size: Adult Regular)   Pulse 68   Temp 97.2  F (36.2  C) (Tympanic)   Wt 39.1 kg (86 lb 3.2 oz)   LMP 12/25/2013   SpO2 97%   Breastfeeding No   BMI 20.78 kg/m    Body mass index is 20.78 kg/m .  Physical Exam   GENERAL: healthy, alert and no distress  NECK: no  adenopathy, no asymmetry, masses, or scars and thyroid normal to palpation  RESP: lungs clear to auscultation - no rales, rhonchi or wheezes  CV: regular rate and rhythm, normal S1 S2, no S3 or S4, no murmur, click or rub, no peripheral edema and peripheral pulses strong  ABDOMEN: soft, nontender, no hepatosplenomegaly, no masses and bowel sounds normal  MS: no gross musculoskeletal defects noted, no edema    Office Visit on 01/28/2020   Component Date Value Ref Range Status      01/28/2020 <5  0 - 30 U/mL Final    Assay Method:  Chemiluminescence using Siemens Centaur XP     WBC 01/28/2020 5.5  4.0 - 11.0 10e9/L Final     RBC Count 01/28/2020 4.07  3.8 - 5.2 10e12/L Final     Hemoglobin 01/28/2020 11.7  11.7 - 15.7 g/dL Final     Hematocrit 01/28/2020 35.5  35.0 - 47.0 % Final     MCV 01/28/2020 87  78 - 100 fl Final     MCH 01/28/2020 28.7  26.5 - 33.0 pg Final     MCHC 01/28/2020 33.0  31.5 - 36.5 g/dL Final     RDW 01/28/2020 13.2  10.0 - 15.0 % Final     Platelet Count 01/28/2020 173  150 - 450 10e9/L Final     % Neutrophils 01/28/2020 62.3  % Final     % Lymphocytes 01/28/2020 30.1  % Final     % Monocytes 01/28/2020 6.9  % Final     % Eosinophils 01/28/2020 0.7  % Final     % Basophils 01/28/2020 0.0  % Final     Absolute Neutrophil 01/28/2020 3.4  1.6 - 8.3 10e9/L Final     Absolute Lymphocytes 01/28/2020 1.7  0.8 - 5.3 10e9/L Final     Absolute Monocytes 01/28/2020 0.4  0.0 - 1.3 10e9/L Final     Absolute Eosinophils 01/28/2020 0.0  0.0 - 0.7 10e9/L Final     Absolute Basophils 01/28/2020 0.0  0.0 - 0.2 10e9/L Final     Diff Method 01/28/2020 Automated Method   Final     No results found for this or any previous visit (from the past 24 hour(s)).

## 2021-06-16 NOTE — PATIENT INSTRUCTIONS
I recommend you see gynecology again about the cysts on your ovaries which were concerning for possible cancer.      Please see the attached referral.  Recommend you be seen at [facility] for second opinion.

## 2021-06-17 ENCOUNTER — PATIENT OUTREACH (OUTPATIENT)
Dept: NURSING | Facility: CLINIC | Age: 61
End: 2021-06-17
Payer: COMMERCIAL

## 2021-06-17 DIAGNOSIS — E55.9 VITAMIN D DEFICIENCY: Primary | ICD-10-CM

## 2021-06-17 LAB — DEPRECATED CALCIDIOL+CALCIFEROL SERPL-MC: 12 UG/L (ref 20–75)

## 2021-06-17 NOTE — PROGRESS NOTES
Clinic Care Coordination Contact  Community Health Worker Initial Outreach    CHW Initial Information Gathering:  Referral Source: (None)  Preferred Urgent Care: (None)  Current living arrangement:: I live in a private home with family  Type of residence:: Private home - stairs  Community Resources: Financial/Insurance  Supplies used at home:: None  Equipment Currently Used at Home: none  Transportation means:: Family     Patient accepts CC: Yes. Patient scheduled for assessment with CC RN on 06/21/21 at 12:30pm. Patient noted desire to discuss patient needs education about diagnisesm schedule appointment and possible transportation resources..     Hollie MULTANI Community Health Worker  Clinic Care Coordination  [facility] : [place], [place] & [place]  Phone: [phone]    Reason for Referral: Complex Medical Concerns/Education: possible ovarian cancer. Needs specialist follow up which patient has not done, Patient/Caregiver Support: education to her family members and encouragement for them to help her schedule and take her to appointments and Resources for Transportation (if children are unable to provide transportation

## 2021-06-18 ENCOUNTER — TELEPHONE (OUTPATIENT)
Dept: FAMILY MEDICINE | Facility: CLINIC | Age: 61
End: 2021-06-18

## 2021-06-18 DIAGNOSIS — E55.9 VITAMIN D DEFICIENCY: Primary | ICD-10-CM

## 2021-06-18 NOTE — TELEPHONE ENCOUNTER
----- Message from Angeline Gong sent at 6/17/2021  4:31 PM CDT -----    ----- Message -----  From: Rosalinda Bradshaw APRN CNP  Sent: 6/17/2021   4:17 PM CDT  To: [facility]    Please call patient to discuss lab results.  Labs were mostly normal.  She is not anemic and does not have low iron stores so she does not need an iron supplement.  She does have low vitamin D.  Should take 5000 international unit(s) daily for 4 months then will recheck her levels.  I will send to her pharmacy.  Please also make sure she has scheduled an appointment with gynecology for the ovarian cysts. Thanks!  Rosalinda

## 2021-06-18 NOTE — TELEPHONE ENCOUNTER
RN attempted to reach pt to relay provider result note below as written, with assistance of [employer]  ID #[id number].    See Consent to Communicate form on file also giving authorization to discuss PHI with [employer], scanned on 1/9/19.    Reason for call relay provider result note below and ong  left message to return call to RN.      If patient calls back:   Route to RN to relay message as written in provider note below.     CARLO SanabriaN, RN

## 2021-06-21 ENCOUNTER — ALLIED HEALTH/NURSE VISIT (OUTPATIENT)
Dept: OBGYN | Facility: CLINIC | Age: 61
End: 2021-06-21
Attending: NURSE PRACTITIONER
Payer: COMMERCIAL

## 2021-06-21 ENCOUNTER — PATIENT OUTREACH (OUTPATIENT)
Dept: NURSING | Facility: CLINIC | Age: 61
End: 2021-06-21
Attending: NURSE PRACTITIONER
Payer: COMMERCIAL

## 2021-06-21 VITALS — WEIGHT: 86 LBS | BODY MASS INDEX: 19.9 KG/M2 | HEIGHT: 55 IN

## 2021-06-21 DIAGNOSIS — N83.8 OVARIAN MASS: Primary | ICD-10-CM

## 2021-06-21 PROCEDURE — G0463 HOSPITAL OUTPT CLINIC VISIT: HCPCS

## 2021-06-21 PROCEDURE — 99203 OFFICE O/P NEW LOW 30 MIN: CPT | Performed by: OBSTETRICS & GYNECOLOGY

## 2021-06-21 SDOH — ECONOMIC STABILITY: TRANSPORTATION INSECURITY
IN THE PAST 12 MONTHS, HAS THE LACK OF TRANSPORTATION KEPT YOU FROM MEDICAL APPOINTMENTS OR FROM GETTING MEDICATIONS?: NO

## 2021-06-21 SDOH — ECONOMIC STABILITY: INCOME INSECURITY: HOW HARD IS IT FOR YOU TO PAY FOR THE VERY BASICS LIKE FOOD, HOUSING, MEDICAL CARE, AND HEATING?: NOT HARD AT ALL

## 2021-06-21 SDOH — ECONOMIC STABILITY: FOOD INSECURITY: WITHIN THE PAST 12 MONTHS, YOU WORRIED THAT YOUR FOOD WOULD RUN OUT BEFORE YOU GOT MONEY TO BUY MORE.: NEVER TRUE

## 2021-06-21 SDOH — ECONOMIC STABILITY: TRANSPORTATION INSECURITY
IN THE PAST 12 MONTHS, HAS LACK OF TRANSPORTATION KEPT YOU FROM MEETINGS, WORK, OR FROM GETTING THINGS NEEDED FOR DAILY LIVING?: NO

## 2021-06-21 SDOH — ECONOMIC STABILITY: FOOD INSECURITY: WITHIN THE PAST 12 MONTHS, THE FOOD YOU BOUGHT JUST DIDN'T LAST AND YOU DIDN'T HAVE MONEY TO GET MORE.: NEVER TRUE

## 2021-06-21 ASSESSMENT — ACTIVITIES OF DAILY LIVING (ADL): DEPENDENT_IADLS:: INDEPENDENT

## 2021-06-21 ASSESSMENT — MIFFLIN-ST. JEOR: SCORE: 791.34

## 2021-06-21 NOTE — TELEPHONE ENCOUNTER
Patient daughter on the below, no questions at this time, verbalized understanding.      Gynecology number given.    Patient will call back to schedule a lab apt to recheck Vitamin D level    Please place the order for Vitamin D    Becky Alvarado RN

## 2021-06-21 NOTE — LETTER
[facility]  Complex Care Plan  About Me:    Patient Name:  Gricelda Navarro    YOB: 1960  Age:         60 year old   Greta MRN:    [id number] Telephone Information:  Home Phone [phone]   Mobile [phone]       Address:  [address] [facility] [address] Email address:  [email or web address]      Emergency Contact(s)    Name Relationship Lgl Grd Work Phone Home Phone Mobile Phone   1. HUGH COLT Daughter    [phone]   2. JANI COLT Daughter   [phone]    3. VALE COLT Daughter    [phone]           Primary language:  ong     needed? Yes   [employer]:  [phone] op. 1  Other communication barriers: Language barrier  Preferred Method of Communication:  Mail  Current living arrangement: I live in a private home with family  Mobility Status/ Medical Equipment: Independent    Health Maintenance  Health Maintenance Reviewed: Due/Overdue   Health Maintenance Due   Topic Date Due     HIV SCREENING  Never done     DTAP/TDAP/TD IMMUNIZATION (1 - Tdap) Never done     ZOSTER IMMUNIZATION (1 of 2) Never done     PREVENTIVE CARE VISIT  02/24/2017     PHQ-9  03/30/2020     LIPID  11/02/2020     MAMMO SCREENING  01/23/2021     HPV TEST  02/24/2021     PAP  02/24/2021         My Access Plan  Medical Emergency 911   Primary Clinic Line [facility] - [phone]   24 Hour Appointment Line [phone] or  [phone]) (toll-free)   24 Hour Nurse Line [phone] (toll-free)   Preferred Urgent Care [facility], [phone](None)   Preferred Hospital [facility]  [phone]   Preferred Pharmacy [facility] [address] [facility]     Behavioral Health Crisis Line The National Suicide Prevention Lifeline at 1-913.307.3704 or 911             My Care Team Members  Patient Care Team       Relationship Specialty Notifications Start End     Marycarmen Taylor MD PCP - General Family Practice  4/8/14     Phone: [phone] Fax: [phone]         [address] [place] [facility] [address]    Keo Mahmood MD Assigned OBGYN Provider   10/23/20     Phone: [phone] Fax: [phone]         [address] [facility] [address] [place] [address]    Marycarmen Taylor MD Assigned PCP   1/31/21     Phone: [phone] Fax: [phone]         [address] [place] [facility] [address]    Ja Alva, RN Lead Care Coordinator Primary Care - [facility] 6/21/21     Phone: [phone] Fax: [phone]                My Care Plans  Self Management and Treatment Plan  Goals and (Comments)  Goals        General    #1  Medical (pt-stated)     Notes - Note edited  6/21/2021  1:57 PM by Ja Alva, RN    Goal Statement: I will make an appointment with the gynecologist in the next 2 weeks.  Date Goal set: 6/21/2021  Barriers: Language is a barrier.  Strengths: Engaged in care coordination.  Date to Achieve By: 12/21/2021  Patient expressed understanding of goal: Yes  Action steps to achieve this goal:  1. I will call the gynecologist office to make an appointment in the next 2 weeks.  2. I will attend the appointment with the gynecologist.  3. I will follow through with the recommendations from the providers.        #2  Medication 1 (pt-stated)     Notes - Note created  6/21/2021  1:59 PM by Ja Alva, RN    Goal Statement: I will  the vitamin D medication and start taking it according to the directions.  Date Goal set: 6/21/2021  Barriers: Language  Strengths: Engaged in care coordination.  Date to Achieve By: 12/21/2021  Patient expressed understanding of goal: Yes  Action steps to achieve this goal:  1. I will  the vitamin D ordered by my provider.  2. I will begin to take the vitamin D as prescribed by my provider.  3. I will take all medications as prescribed by the providers.        #3  Medical  (pt-stated)     Notes - Note created  6/21/2021  2:03 PM by Ja Alva RN    Goal Statement: I will work to close the gaps in my health maintenance items including immunizations, preventative care, and screenings.  Date Goal set: 6/21/2021  Barriers: Language  Strengths: Engaged in care coordination.  Date to Achieve By: 12/21/2021.  Patient expressed understanding of goal: Yes  Action steps to achieve this goal:  1. I will make a preventative care appointment with my provider to get all the screenings and immunizations done as well.  2. I will work with my family on filling out the advanced care directives.  3. I will complete the blood screenings that need to be done such as lipids and others.               Action Plans on File:                       Advance Care Plans/Directives Type:        My Medical and Care Information  Problem List   Patient Active Problem List   Diagnosis     Recurrent cold sores     Gastritis     Major depression     Generalized anxiety disorder     Seasonal allergic rhinitis     Iron deficiency anemia     CARDIOVASCULAR SCREENING; LDL GOAL LESS THAN 160     Vitamin D deficiency     External hemorrhoids     Internal hemorrhoids     Pelvic floor dysfunction     RLQ abdominal pain     Nephrolithiasis     Left ovarian cyst     Liver masses     Multinodular thyroid     H. pylori infection     Moderate episode of recurrent major depressive disorder (H)     Abnormal pelvic ultrasound     Cysts of both ovaries     Abnormal MRI, pelvis      Current Medications and Allergies:  See printed Medication Report.    Care Coordination Start Date: 6/21/2021   Frequency of Care Coordination: 2 weeks   Form Last Updated: 06/21/2021

## 2021-06-21 NOTE — PROGRESS NOTES
Pain 2020 on the right, cysts on both side.  Pain around a 2, still there all the time.  Appetite good, no weight loss, no bowel concerns.      CC/HPI:   Gricelda Navarro is a 60 year old postmenopausal  who presents today with her daughter to follow up bilateral ovarian cysts.   She noted pain on the right side that started in 2020.  She had an ultrasound and MRI at the time that showed bilateral ovarian cysts, largest one measured 3 cm on the left.  She had a normal Ca125 and was seen by Dr. Sandoval who offered her a laparoscopic BSO.  She did not seek further follow up or surgery after that.  She was recently seen in primary care and referred here for further evaluation.  She notes daily, constant pain on the right side, about a 2/10.  She denies loss of appetite, weight loss, bloating or other consitutional symptoms.      HISTORIES:  Patient Active Problem List   Diagnosis     Recurrent cold sores     Gastritis     Major depression     Generalized anxiety disorder     Seasonal allergic rhinitis     Iron deficiency anemia     CARDIOVASCULAR SCREENING; LDL GOAL LESS THAN 160     Vitamin D deficiency     External hemorrhoids     Internal hemorrhoids     Pelvic floor dysfunction     RLQ abdominal pain     Nephrolithiasis     Left ovarian cyst     Liver masses     Multinodular thyroid     H. pylori infection     Moderate episode of recurrent major depressive disorder (H)     Abnormal pelvic ultrasound     Cysts of both ovaries     Abnormal MRI, pelvis     Past Medical History:   Diagnosis Date     Back pain      KELSIE (generalised anxiety disorder)      Gastritis      Iron deficiency anemia      Major depression      Recurrent cold sores      Seasonal allergic rhinitis      Past Surgical History:   Procedure Laterality Date     C/SECTION, LOW TRANSVERSE      , Low Transverse     COLONOSCOPY WITH CO2 INSUFFLATION N/A 2017    Procedure: COLONOSCOPY WITH CO2 INSUFFLATION;  colonoscopy/Dr. Cabello  [place]/Rectal mass [K62.9]  - Primary /BMi: [facility]: [phone];  Surgeon: John Aguilar MD;  Location: [facility] OR     TUBAL LIGATION       Current Outpatient Medications   Medication Sig Dispense Refill     cetirizine (ZYRTEC) 10 MG tablet Take 1 tablet (10 mg) by mouth daily 90 tablet 0     vitamin D3 (CHOLECALCIFEROL) 125 MCG (5000 UT) tablet Take 1 tablet (125 mcg) by mouth daily 30 tablet 3     Allergies   Allergen Reactions     Cortisone Itching, Swelling and Rash     Cyclobenzaprine Rash     Gabapentin Itching     Hydrocodone Itching     Prednisone Hives     Social History     Socioeconomic History     Marital status:      Spouse name: Gricelda     Number of children: 6     Years of education: Not on file     Highest education level: Not on file   Occupational History     Not on file   Social Needs     Financial resource strain: Not hard at all     Food insecurity     Worry: Never true     Inability: Never true     Transportation needs     Medical: No     Non-medical: No   Tobacco Use     Smoking status: Never Smoker     Smokeless tobacco: Never Used   Substance and Sexual Activity     Alcohol use: No     Drug use: No     Sexual activity: Yes     Partners: Male     Birth control/protection: Female Surgical, Post-menopausal   Lifestyle     Physical activity     Days per week: Not on file     Minutes per session: Not on file     Stress: Not on file   Relationships     Social connections     Talks on phone: Not on file     Gets together: Not on file     Attends [employer] service: Not on file     Active member of club or organization: Not on file     Attends meetings of clubs or organizations: Not on file     Relationship status: Not on file     Intimate partner violence     Fear of current or ex partner: Not on file     Emotionally abused: Not on file     Physically abused: Not on file     Forced sexual activity: Not on file   Other Topics Concern     Parent/sibling w/ CABG, MI or angioplasty before  "65F 55M? Not Asked   Social History Narrative     Not on file     Family History   Problem Relation Age of Onset     Unknown/Adopted Mother      Unknown/Adopted Father           Gyn Hx:   Patient's last menstrual period was 12/25/2013.  Menses:   NA    Review Of Systems:  See HPI    EXAM:  Ht 1.372 m (4' 6\")   Wt 39 kg (86 lb)   LMP 12/25/2013   BMI 20.74 kg/m    Body mass index is 20.74 kg/m .    General - pleasant female in no acute distress.  Musculoskeletal - no gross deformities.  Neurological - normal strength, sensation, and mental status.    ca125 6/21 <5    EXAMINATION: MR PELVIS (GYN) WO & W CONTRAST, 1/20/2020 8:57 AM     COMPARISON: Ultrasonography 1/8/2020     HISTORY: Adnexal mass, US complex or solid mass, follow up; Cysts of  both ovaries; Cysts of both ovaries; Abnormal pelvic ultrasound     TECHNIQUE: Multiplanar, multisequence imaging was obtained of the  pelvis without and with intravenous contrast. Contrast dose: 6 ml  gadavist     FINDINGS: 6.7 x 3.3 x 5.5 cm uterus. 4 mm thickness of the  endometrium, within normal limits. No focal lesion in the uterus.     Regarding the ovaries:     Few cysts in the right ovary, largest measuring up to 1 cm. 5 and 7 mm  cysts in the posterior aspect of the right ovary demonstrating T2  hypointense rim-like appearance, likely representing thin calcified  wall. No suspicious nodular or septal enhancement on the postcontrast  series.     Multicystic appearance of the left ovary. Most notably, there is 3 x  2.3 cm complex cystic lesion demonstrating multiple T2 hypointense, T1  hypointense thin septations. No suspicious nodular or septal  enhancement or septal thickening.     No free fluid or collection in the pelvis. No pelvic lymphadenopathy.  Osseous structures are unremarkable.                                                                      IMPRESSION: Persistent bilateral ovarian cysts in the postmenopausal  stage. The largest 3 cm cyst arising from " the left ovary demonstrates  some complex features suggestive of a cystic neoplasm, including  multiple thin septations, although there is no suspicious nodular or  septal enhancement to raise concern for aggressive malignancy. Few  small cysts on the right demonstrate no suspicious features. Recommend  OB/GYN consultation.     I have personally reviewed the examination and initial interpretation  and I agree with the findings.     DERIAN GÓMEZ, DO    Pelvic Ultrasound-transabdominal and transvaginal     Comparisons: None     History: Follow-up ovarian cyst     Findings: The uterus measures 6.9 x 3.0 x 5.3cm. The endometrial  stripe measures 2 mm thick.     The right and left ovaries measure 2.0 x 1.0 x 1.5 cm and 4.6 x 2.0 x  3.2 cm, respectively. There are at least 2 simple appearing cysts in  the right ovary measuring up to 10 mm. There is a at least one  shadowing calcification in the right ovary. There are at least 3 cysts  in the left ovary measuring up to 2.8 cm with thin septations. There  is at least one coarse calcification with soft tissue component in the  left ovary. Both ovaries demonstrate normal blood flow.                                                                      Impression: Cystic lesions, mostly simple, with coarse calcifications  and small soft tissue components in both ovaries are unusual in this  59-year-old postmenopausal patient. Consider pelvic MRI for further  evaluation.     BHARGAV FORD MD    ASSESSMENT    59 y/o postmenopausal  with a history of bilateral ovarian cysts, recent normal ca125    Plan    Repeat pelvic ultrasound since it has been 1.5 years from her last imaging.  Schedule follow up visit to discuss results after-her daughter will schedule this and come with her again.      Cindy Thomas MD, FACOG

## 2021-06-21 NOTE — PROGRESS NOTES
Clinic Care Coordination Contact    Clinic Care Coordination Contact  OUTREACH    Referral Information:  Referral Source: PCP(None)    Primary Diagnosis: Gynecological disorders    Chief Complaint   Patient presents with     Clinic Care Coordination - Initial     RN CC nurse care coordinator (Ja)        Universal Utilization: The patient uses the [facility] system and the [facility].  Clinic Utilization  Difficulty keeping appointments:: No  Compliance Concerns: No  No-Show Concerns: No  No PCP office visit in Past Year: No  Utilization    Last refreshed: 6/21/2021  1:32 PM: Hospital Admissions 0           Last refreshed: 6/21/2021  1:32 PM: ED Visits 0           Last refreshed: 6/21/2021  1:32 PM: No Show Count (past year) 0              Current as of: 6/21/2021  1:32 PM              Clinical Concerns:  Current Medical Concerns:  The patient has a history of ovarian cysts and is not making a follow up appointment with the gynecologist.  The patient had not gotten her lab results so the RN CC read the note from the provider to the patient via the .  The patient did not have any other questions.  The RN CC explained the importance of making and attending the appointment as a follow up with the gynecologist.  The patient states that she does not feel well and has considered going to the ED although they will just draw more blood.  The RN CC convinced the patient to get the vitamin D and begin taking it every day and she will feel better once her level is up in a normal range.  The patient states that she will do both making the appointment and taking the vitamin D.  The CHW will follow up with her in 2 weeks for a progress update.    Patient Active Problem List   Diagnosis     Recurrent cold sores     Gastritis     Major depression     Generalized anxiety disorder     Seasonal allergic rhinitis     Iron deficiency anemia     CARDIOVASCULAR SCREENING; LDL GOAL LESS THAN 160      Vitamin D deficiency     External hemorrhoids     Internal hemorrhoids     Pelvic floor dysfunction     RLQ abdominal pain     Nephrolithiasis     Left ovarian cyst     Liver masses     Multinodular thyroid     H. pylori infection     Moderate episode of recurrent major depressive disorder (H)     Abnormal pelvic ultrasound     Cysts of both ovaries     Abnormal MRI, pelvis           Current Behavioral Concerns: none currently noted.    Education Provided to patient: options for care coordination.   Pain  Pain (GOAL):: No  Health Maintenance Reviewed: Due/Overdue   Health Maintenance Due   Topic Date Due     HIV SCREENING  Never done     DTAP/TDAP/TD IMMUNIZATION (1 - Tdap) Never done     ZOSTER IMMUNIZATION (1 of 2) Never done     PREVENTIVE CARE VISIT  02/24/2017     PHQ-9  03/30/2020     LIPID  11/02/2020     MAMMO SCREENING  01/23/2021     HPV TEST  02/24/2021     PAP  02/24/2021       Clinical Pathway: None    Medication Management:  Patient is knowledgeable on medications and is adherent.  No financial concerns reported at this time.  Medication review was completed with the patient and there are no questions or concerns at this time.       Functional Status:  Dependent ADLs:: Independent  Dependent IADLs:: Independent  Bed or wheelchair confined:: No  Mobility Status: Independent  Fallen 2 or more times in the past year?: No  Any fall with injury in the past year?: No    Living Situation:  Current living arrangement:: I live in a private home with family  Type of residence:: Private home - stairs    Lifestyle & Psychosocial Needs:     Social Needs     Financial resource strain: Not hard at all     Food insecurity     Worry: Never true     Inability: Never true     Transportation needs     Medical: No     Non-medical: No     Diet:: Regular  Inadequate nutrition (GOAL):: No  Tube Feeding: No  Inadequate activity/exercise (GOAL):: No  Significant changes in sleep pattern (GOAL): No  Transportation means::  Family     Catholic or spiritual beliefs that impact treatment:: No  Mental health DX:: No  Mental health management concern (GOAL):: No  Chemical Dependency Status: No Current Concerns  Informal Support system:: Children, Family, Spouse   Socioeconomic History     Marital status:      Spouse name: Gricelda     Number of children: 6     Years of education: Not on file     Highest education level: Not on file     Tobacco Use     Smoking status: Never Smoker     Smokeless tobacco: Never Used   Substance and Sexual Activity     Alcohol use: No     Drug use: No     Sexual activity: Yes     Partners: Male     Birth control/protection: Female Surgical, Post-menopausal               Resources and Interventions:  Current Resources:      Community Resources: Financial/Insurance  Supplies used at home:: None  Equipment Currently Used at Home: none   )   )    Advance Care Plan/Directive  Advanced Care Plans/Directives on file:: No  Advanced Care Plan/Directive Status: Considering Options    Referrals Placed: None     Goals:   Goals        General    #1  Medical (pt-stated)     Notes - Note edited  6/21/2021  1:57 PM by Ja Alva RN    Goal Statement: I will make an appointment with the gynecologist in the next 2 weeks.  Date Goal set: 6/21/2021  Barriers: Language is a barrier.  Strengths: Engaged in care coordination.  Date to Achieve By: 12/21/2021  Patient expressed understanding of goal: Yes  Action steps to achieve this goal:  1. I will call the gynecologist office to make an appointment in the next 2 weeks.  2. I will attend the appointment with the gynecologist.  3. I will follow through with the recommendations from the providers.        #2  Medication 1 (pt-stated)     Notes - Note created  6/21/2021  1:59 PM by Ja Alva RN    Goal Statement: I will  the vitamin D medication and start taking it according to the directions.  Date Goal set: 6/21/2021  Barriers: Language  Strengths: Engaged in care  coordination.  Date to Achieve By: 12/21/2021  Patient expressed understanding of goal: Yes  Action steps to achieve this goal:  1. I will  the vitamin D ordered by my provider.  2. I will begin to take the vitamin D as prescribed by my provider.  3. I will take all medications as prescribed by the providers.        #3  Medical (pt-stated)     Notes - Note created  6/21/2021  2:03 PM by Ja Gallardo RN    Goal Statement: I will work to close the gaps in my health maintenance items including immunizations, preventative care, and screenings.  Date Goal set: 6/21/2021  Barriers: Language  Strengths: Engaged in care coordination.  Date to Achieve By: 12/21/2021.  Patient expressed understanding of goal: Yes  Action steps to achieve this goal:  1. I will make a preventative care appointment with my provider to get all the screenings and immunizations done as well.  2. I will work with my family on filling out the advanced care directives.  3. I will complete the blood screenings that need to be done such as lipids and others.              Patient/Caregiver understanding: The patient has poor understanding of the disease process.    Outreach Frequency: 2 weeks  Future Appointments              Today Enedina Rodriguez MD; VIDEO,  [facility]          Plan: 1.  The patient will make an appointment with the gynecologist to follow up on the ovarian cysts.  2.  The patient will take all medications as prescribed by the providers.  3.  The patient will  the Vitamin D and take as directed.  4.  The patient will work on meeting all of her health maintenance care gaps.  5.  The CHW will reach out in 2 weeks with the patient and the RN CC will review the chart in 4 weeks.          Ja Gallardo MSN, RN, PHN, CCM   Primary Care Clinical RN Care Coordinator  [facility]  6/21/2021   3:30 PM  [email or web address]  Office: [phone]

## 2021-06-21 NOTE — LETTER
[facility] CARE COORDINATION  [address] [facility] [place] [address]    June 21, 2021    Gricelda Navarro  [address] [facility] [place] [address]      Dear Gricelda,    I am a clinic care coordinator who works with Marycarmen Cuba MD at [facility]. I wanted to thank you for spending the time to talk with me.  Below is a description of clinic care coordination and how I can further assist you.      The clinic care coordination team is made up of a registered nurse,  and community health worker who understand the health care system. The goal of clinic care coordination is to help you manage your health and improve access to the health care system in the most efficient manner. The team can assist you in meeting your health care goals by providing education, coordinating services, strengthening the communication among your providers and supporting you with any resource needs.    Please feel free to contact me at [phone] with any questions or concerns. We are focused on providing you with the highest-quality healthcare experience possible and that all starts with you.     Sincerely,     Ja Gallardo MSN, RN, PHN, [facility]   Primary Care Clinical RN Care Coordinator  [facility]  6/21/2021   2:05 PM  [email or web address]  Office: [phone]  Hollie Daily [facility]  Office: [phone]    Enclosed: I have enclosed a copy of the Complex Care Plan. This has helpful information and goals that we have talked about. Please keep this in an easy to access place to use as needed.

## 2021-06-30 ENCOUNTER — OFFICE VISIT (OUTPATIENT)
Dept: OBGYN | Facility: CLINIC | Age: 61
End: 2021-06-30
Attending: OBSTETRICS & GYNECOLOGY
Payer: COMMERCIAL

## 2021-06-30 ENCOUNTER — ANCILLARY PROCEDURE (OUTPATIENT)
Dept: ULTRASOUND IMAGING | Facility: CLINIC | Age: 61
End: 2021-06-30
Attending: OBSTETRICS & GYNECOLOGY
Payer: COMMERCIAL

## 2021-06-30 VITALS — WEIGHT: 86 LBS | BODY MASS INDEX: 19.9 KG/M2 | HEIGHT: 55 IN

## 2021-06-30 DIAGNOSIS — N83.202 CYSTS OF BOTH OVARIES: Primary | ICD-10-CM

## 2021-06-30 DIAGNOSIS — N83.201 CYSTS OF BOTH OVARIES: Primary | ICD-10-CM

## 2021-06-30 DIAGNOSIS — N83.8 OVARIAN MASS: ICD-10-CM

## 2021-06-30 PROCEDURE — 76830 TRANSVAGINAL US NON-OB: CPT | Mod: 26 | Performed by: OBSTETRICS & GYNECOLOGY

## 2021-06-30 PROCEDURE — G0463 HOSPITAL OUTPT CLINIC VISIT: HCPCS

## 2021-06-30 PROCEDURE — 99213 OFFICE O/P EST LOW 20 MIN: CPT | Performed by: OBSTETRICS & GYNECOLOGY

## 2021-06-30 PROCEDURE — 76830 TRANSVAGINAL US NON-OB: CPT

## 2021-06-30 ASSESSMENT — MIFFLIN-ST. JEOR: SCORE: 791.34

## 2021-06-30 NOTE — PROGRESS NOTES
59 yo  here for ultrasound followup.   She is here with her daughter today.     States has intermittent mild RLQ pain which does not affect her activities and that is the reason for the past few years of exams.   CT scan 2020 showed complex right ovarian cyst.   Today underwent transvaginal ultrasound:    Uterine findings:              Presence: Visible Size: Normal 4.7x4.4x2.7 cm.  Endometrium = 2.0 mm. Without fluid - 1.4mm.              Cx length = 29.6 mm.                            Flexion:  Anteverted    Position: Midline          Margins: Smooth         Shape: Normal              Contour: Regular        Texture: Homogeneous          Cavity: small amount of fluid in cavity          Masses: Normal     Pelvic findings:               Right Adnexa: Normal              Left Adnexa: Normal              Bladder:  Normal                                                           Cul - de - sac fluid: None     Ovarian follicles:              Right ovary:  2.6x1.4x1.4cm.                 1 simple cyst                  Size(s):  1.1 x 1.1 x 1.0cm                 Left ovary:  3.9x3.1x1.7cm.                 2 simple cysts                  Size(s):  1.) 1.8 x 1.8 x 1.2cm  2.) 10 x 9 x 8mm        Comments:  simple small cyst right ovary and 2 simple small cysts of left ovary.   Note: 3.9 cm complex cyst from imaging 2020 has resolved    Details are reviewed in detail. We discussed that simple small cysts in menopause may be normal and do not require further evaluation.   Offered laparoscopic BSO: declines. Offered followup in 6-12 months: accepted.     A/P:  Complex cyst has resolved  Now bilateral small simple cysts    Re-evaluate in 6 months.   Call sooner if pain is significant.   Susy Molina MD

## 2021-06-30 NOTE — LETTER
6/30/2021       RE: Gricelda Navarro  [address] [facility] [place] [address]     Dear Colleague,    Thank you for referring your patient, Gricelda Navarro, to the [facility] [place] [facility]. Please see a copy of my visit note below.    61 yo  here for ultrasound followup.   She is here with her daughter today.     States has intermittent mild RLQ pain which does not affect her activities and that is the reason for the past few years of exams.   CT scan 2020 showed complex right ovarian cyst.   Today underwent transvaginal ultrasound:    Uterine findings:              Presence: Visible Size: Normal 4.7x4.4x2.7 cm.  Endometrium = 2.0 mm. Without fluid - 1.4mm.              Cx length = 29.6 mm.                            Flexion:  Anteverted    Position: Midline          Margins: Smooth         Shape: Normal              Contour: Regular        Texture: Homogeneous          Cavity: small amount of fluid in cavity          Masses: Normal     Pelvic findings:               Right Adnexa: Normal              Left Adnexa: Normal              Bladder:  Normal                                                           Cul - de - sac fluid: None     Ovarian follicles:              Right ovary:  2.6x1.4x1.4cm.                 1 simple cyst                  Size(s):  1.1 x 1.1 x 1.0cm                 Left ovary:  3.9x3.1x1.7cm.                 2 simple cysts                  Size(s):  1.) 1.8 x 1.8 x 1.2cm  2.) 10 x 9 x 8mm        Comments:  simple small cyst right ovary and 2 simple small cysts of left ovary.   Note: 3.9 cm complex cyst from imaging 2020 has resolved    Details are reviewed in detail. We discussed that simple small cysts in menopause may be normal and do not require further evaluation.   Offered laparoscopic BSO: declines. Offered followup in 6-12 months: accepted.     A/P:  Complex cyst has resolved  Now bilateral small simple  cysts    Re-evaluate in 6 months.   Call sooner if pain is significant.   Susy Molina MD

## 2021-07-19 ENCOUNTER — PATIENT OUTREACH (OUTPATIENT)
Dept: CARE COORDINATION | Facility: CLINIC | Age: 61
End: 2021-07-19

## 2021-07-19 NOTE — PROGRESS NOTES
Clinic Care Coordination Contact  Care Team Conversations    The RN CC nurse care coordinator will place the review of the chart out 3 weeks to allow for the  CHW to get caught up with her calls.      Plan:  The RN CC nurse care coordinator will review the chart again 6 weeks.  It appears that the patient has made and attended the appointments with OB/GYN.        Ja Gallardo MSN, RN, PHN, CCM   Primary Care Clinical RN Care Coordinator  [facility]  7/19/2021   9:43 AM  [email or web address]  Office: [phone]

## 2021-07-22 ENCOUNTER — PATIENT OUTREACH (OUTPATIENT)
Dept: CARE COORDINATION | Facility: CLINIC | Age: 61
End: 2021-07-22

## 2021-07-22 NOTE — PROGRESS NOTES
Clinic Care Coordination Contact  [facility]/Voicemail       Clinical Data: CHW Outreach  Outreach attempted x 1. CHW called number and call would not go through.    Plan: CHW will try to reach patient again in 5-7 business days.    Hollie MULTANI Community Health Worker  Clinic Care Coordination  [facility] Clin[facility] : [place], [place] & [place]  Phone: [phone]    Notes:  How are you?  Did you complete HCD    Did you schedule following appointments?    - OBGYN  - PCP- physical and labs     Are you taking medications  Did you  Vitamin D  Have you started taking vitamin D

## 2021-08-03 ENCOUNTER — PATIENT OUTREACH (OUTPATIENT)
Dept: CARE COORDINATION | Facility: CLINIC | Age: 61
End: 2021-08-03

## 2021-08-03 NOTE — PROGRESS NOTES
Clinic Care Coordination Contact  [facility]/Voicemail       Clinical Data: Care Coordinator Outreach  Outreach attempted x 2.  Left message with patient's daughter Lilo.  Lilo stated that her mom is not home right now, but will give her the message to call.    Plan: Care Coordinator will try to reach patient again in 10 business days.    Next Outreach: 8/17/21    GALLITO Caba  Clinic Care Coordination  [facility] Clin[facility]: [place], and [place] [facility]  Phone: [phone]

## 2021-08-11 ENCOUNTER — PATIENT OUTREACH (OUTPATIENT)
Dept: CARE COORDINATION | Facility: CLINIC | Age: 61
End: 2021-08-11

## 2021-08-11 NOTE — PROGRESS NOTES
Clinic Care Coordination Contact  [facility]       Clinical Data: Care Coordinator Outreach  Outreach attempted x 3.  Last message was left with the daughter of the patient.  No response back to the phone calls.  Therefore an unable to contact letter was sent to the patient via [employer].  Plan: Care Coordinator will send unable to contact letter with care coordinator contact information via [employer]. Care Coordinator will do no further outreaches at this time.          Ja Gallardo MSN, RN, PHN, [facility]   Primary Care Clinical RN Care Coordinator  [facility]  8/11/2021   8:00 AM  [email or web address]  Office: [phone]

## 2021-08-11 NOTE — LETTER
[facility] CARE COORDINATION  [address]  [facility] [place] [address]    August 11, 2021    Gricelda Navarro  [address] [facility] [place] [address]      Dear Gricelda,    I have been attempting to reach you since our last contact. I would like to continue to work with you and provide any additional support you may need on achieving your health care related goals. I would appreciate if you would give me a call at [phone] to let me know if you would like to continue working together. I know that there are many things that can affect our ability to communicate and I hope we can continue to work together.    All of us at the [facility] are invested in your health and are here to assist you in meeting your goals.     Sincerely,    Ja Alva RN

## 2021-10-03 ENCOUNTER — HEALTH MAINTENANCE LETTER (OUTPATIENT)
Age: 61
End: 2021-10-03

## 2022-02-01 ENCOUNTER — OFFICE VISIT (OUTPATIENT)
Dept: OPTOMETRY | Facility: CLINIC | Age: 62
End: 2022-02-01
Payer: COMMERCIAL

## 2022-02-01 DIAGNOSIS — H52.4 PRESBYOPIA: ICD-10-CM

## 2022-02-01 DIAGNOSIS — H25.13 AGE-RELATED NUCLEAR CATARACT OF BOTH EYES: ICD-10-CM

## 2022-02-01 DIAGNOSIS — H52.03 HYPEROPIA, BILATERAL: ICD-10-CM

## 2022-02-01 DIAGNOSIS — H10.13 ALLERGIC CONJUNCTIVITIS, BILATERAL: Primary | ICD-10-CM

## 2022-02-01 PROCEDURE — 92014 COMPRE OPH EXAM EST PT 1/>: CPT | Performed by: OPTOMETRIST

## 2022-02-01 PROCEDURE — 92015 DETERMINE REFRACTIVE STATE: CPT | Performed by: OPTOMETRIST

## 2022-02-01 RX ORDER — LORATADINE 10 MG/1
10 TABLET, ORALLY DISINTEGRATING ORAL DAILY
Qty: 90 TABLET | Refills: 3 | Status: SHIPPED | OUTPATIENT
Start: 2022-02-01 | End: 2023-10-20

## 2022-02-01 RX ORDER — OLOPATADINE HYDROCHLORIDE 2 MG/ML
1 SOLUTION/ DROPS OPHTHALMIC DAILY
Qty: 2.5 ML | Refills: 11 | Status: SHIPPED | OUTPATIENT
Start: 2022-02-01 | End: 2024-06-07

## 2022-02-01 ASSESSMENT — KERATOMETRY
OS_AXISANGLE_DEGREES: 154
OS_K2POWER_DIOPTERS: 43.75
OD_AXISANGLE_DEGREES: 090
OD_AXISANGLE2_DEGREES: 180
OD_K2POWER_DIOPTERS: 43.00
OS_K1POWER_DIOPTERS: 43.50
OD_K1POWER_DIOPTERS: 43.00
OS_AXISANGLE2_DEGREES: 064

## 2022-02-01 ASSESSMENT — REFRACTION_MANIFEST
OD_CYLINDER: +0.25
OS_CYLINDER: +0.50
OS_SPHERE: +1.50
OS_ADD: +2.50
OD_AXIS: 098
OS_AXIS: 130
OD_CYLINDER: +0.25
METHOD_AUTOREFRACTION: 1
OS_CYLINDER: +0.50
OD_ADD: +2.50
OS_AXIS: 018
OS_SPHERE: +1.50
OD_SPHERE: +1.50
OD_SPHERE: +1.50
OD_AXIS: 100

## 2022-02-01 ASSESSMENT — TONOMETRY
IOP_METHOD: APPLANATION
OD_IOP_MMHG: 14
OS_IOP_MMHG: 14

## 2022-02-01 ASSESSMENT — EXTERNAL EXAM - LEFT EYE: OS_EXAM: NORMAL

## 2022-02-01 ASSESSMENT — CUP TO DISC RATIO
OD_RATIO: 0.15
OS_RATIO: 0.15

## 2022-02-01 ASSESSMENT — VISUAL ACUITY
OS_SC+: -1
OD_CC: 20/20
OS_PH_SC: 20/30
OD_PH_SC: 20/30
OS_SC: 20/50
METHOD: NUMBERS - LINEAR
CORRECTION_TYPE: GLASSES
OD_SC: 20/60
OS_CC: 20/40-1

## 2022-02-01 ASSESSMENT — REFRACTION_WEARINGRX
OS_SPHERE: +3.00
OD_CYLINDER: SPHERE
OD_SPHERE: +3.00
OS_CYLINDER: SPHERE

## 2022-02-01 ASSESSMENT — EXTERNAL EXAM - RIGHT EYE: OD_EXAM: NORMAL

## 2022-02-01 ASSESSMENT — CONF VISUAL FIELD
OS_NORMAL: 1
OD_NORMAL: 1

## 2022-02-01 ASSESSMENT — SLIT LAMP EXAM - LIDS
COMMENTS: NORMAL
COMMENTS: NORMAL

## 2022-02-01 NOTE — PATIENT INSTRUCTIONS
Presbyopia is the diagnosis. Presbyopia is an age-related condition where the eye's crystalline lens doesn't change shape as easily as it once did.    You have the start of mild cataracts.  You may notice some blurred vision or glare with night driving.  It is important that you wear good sunglasses to protect your eyes from the ultraviolet light from the sun.  I recommend that you return in 1 year for an eye exam unless there are any sudden changes in your vision.       Astigmatism results from curvature differential in the cornea and crystalline lens which can cause a distorted image, as light rays are prevented from meeting at a common focus.    You have allergies that are affecting your eyes.  This can cause itching and tearing of the eyes.  I recommend allergy drops to be used daily for 4 weeks.  Then you can then use it when your eyes are bothering you or if there are certain times of the year when you know you will be affected.  Cold compresses can also make things more comfortable.  Try not to rub the eyes.  I recommend that you see your primary care doctor or an allergist if you have other symptoms such as a runny nose or sneezing which has not been evaluated before or if your current medications don't seem to be helping.    PATADAY EYEDROPS are antihistamine eye drops to address the watery eyes.    Loratadine anithistamine pills are to be taken daily.    Eyeglass prescription given.    ANNUAL EYE EXAMINATIONS RECOMMENDED    The affects of the dilating drops last for 4- 6 hours.  You will be more sensitive to light and vision will be blurry up close.  Do not drive if you do not feel comfortable.  Mydriatic sunglasses were given if needed.      Shelia Kikr O.D.  [address] [address]    [phone]

## 2022-02-01 NOTE — LETTER
2/1/2022         RE: Gricelda Navarro  [address] [place] [facility] [place] [address]        Dear Colleague,    Thank you for referring your patient, Gricelda Navarro, to the [facility]. Please see a copy of my visit note below.    Chief Complaint   Patient presents with     Annual Eye Exam      Accompanied by  and daughter   Last Eye Exam: 4-9-2019  Dilated Previously: Yes    What are you currently using to see?  readers       Distance Vision Acuity: Noticed gradual change in both eyes    Near Vision Acuity: Satisfied with vision while reading  with glasses    Eye Comfort: watery when wears glasses   Do you use eye drops? : Yes: does not know name   Occupation or Hobbies: none    Judi Treviño Optometric Assistant, A.B.O.C.          Medical, surgical and family histories reviewed and updated 2/1/2022.       OBJECTIVE: See Ophthalmology exam    ASSESSMENT:    ICD-10-CM    1. Allergic conjunctivitis, bilateral  H10.13 olopatadine (PATADAY) 0.2 % ophthalmic solution     loratadine (CLARITIN REDITABS) 10 MG ODT   2. Presbyopia - Both Eyes  H52.4 EYE EXAM (SIMPLE-NONBILLABLE)     REFRACTION   3. Hyperopia, bilateral - Both Eyes  H52.03 EYE EXAM (SIMPLE-NONBILLABLE)     REFRACTION   4. Age-related nuclear cataract of both eyes  H25.13 EYE EXAM (SIMPLE-NONBILLABLE)     REFRACTION      PLAN:     Patient Instructions   Presbyopia is the diagnosis. Presbyopia is an age-related condition where the eye's crystalline lens doesn't change shape as easily as it once did.    You have the start of mild cataracts.  You may notice some blurred vision or glare with night driving.  It is important that you wear good sunglasses to protect your eyes from the ultraviolet light from the sun.  I recommend that you return in 1 year for an eye exam unless there are any sudden changes in your vision.       Astigmatism results from curvature differential in the cornea and crystalline lens which can cause a distorted image,  as light rays are prevented from meeting at a common focus.    You have allergies that are affecting your eyes.  This can cause itching and tearing of the eyes.  I recommend allergy drops to be used daily for 4 weeks.  Then you can then use it when your eyes are bothering you or if there are certain times of the year when you know you will be affected.  Cold compresses can also make things more comfortable.  Try not to rub the eyes.  I recommend that you see your primary care doctor or an allergist if you have other symptoms such as a runny nose or sneezing which has not been evaluated before or if your current medications don't seem to be helping.    PATADAY EYEDROPS are antihistamine eye drops to address the watery eyes.    Loratadine anithistamine pills are to be taken daily.    Eyeglass prescription given.    ANNUAL EYE EXAMINATIONS RECOMMENDED    The affects of the dilating drops last for 4- 6 hours.  You will be more sensitive to light and vision will be blurry up close.  Do not drive if you do not feel comfortable.  Mydriatic sunglasses were given if needed.      Shelia Kirk O.D.  [address] [address]    [phone]             Again, thank you for allowing me to participate in the care of your patient.        Sincerely,        Shelia Kirk OD

## 2022-02-01 NOTE — PROGRESS NOTES
Chief Complaint   Patient presents with     Annual Eye Exam      Accompanied by  and daughter   Last Eye Exam: 4-9-2019  Dilated Previously: Yes    What are you currently using to see?  readers       Distance Vision Acuity: Noticed gradual change in both eyes    Near Vision Acuity: Satisfied with vision while reading  with glasses    Eye Comfort: watery when wears glasses   Do you use eye drops? : Yes: does not know name   Occupation or Hobbies: none    Judi Treviño Optometric Assistant, A.B.O.C.          Medical, surgical and family histories reviewed and updated 2/1/2022.       OBJECTIVE: See Ophthalmology exam    ASSESSMENT:    ICD-10-CM    1. Allergic conjunctivitis, bilateral  H10.13 olopatadine (PATADAY) 0.2 % ophthalmic solution     loratadine (CLARITIN REDITABS) 10 MG ODT   2. Presbyopia - Both Eyes  H52.4 EYE EXAM (SIMPLE-NONBILLABLE)     REFRACTION   3. Hyperopia, bilateral - Both Eyes  H52.03 EYE EXAM (SIMPLE-NONBILLABLE)     REFRACTION   4. Age-related nuclear cataract of both eyes  H25.13 EYE EXAM (SIMPLE-NONBILLABLE)     REFRACTION      PLAN:     Patient Instructions   Presbyopia is the diagnosis. Presbyopia is an age-related condition where the eye's crystalline lens doesn't change shape as easily as it once did.    You have the start of mild cataracts.  You may notice some blurred vision or glare with night driving.  It is important that you wear good sunglasses to protect your eyes from the ultraviolet light from the sun.  I recommend that you return in 1 year for an eye exam unless there are any sudden changes in your vision.       Astigmatism results from curvature differential in the cornea and crystalline lens which can cause a distorted image, as light rays are prevented from meeting at a common focus.    You have allergies that are affecting your eyes.  This can cause itching and tearing of the eyes.  I recommend allergy drops to be used daily for 4 weeks.  Then you can then use it when  your eyes are bothering you or if there are certain times of the year when you know you will be affected.  Cold compresses can also make things more comfortable.  Try not to rub the eyes.  I recommend that you see your primary care doctor or an allergist if you have other symptoms such as a runny nose or sneezing which has not been evaluated before or if your current medications don't seem to be helping.    PATADAY EYEDROPS are antihistamine eye drops to address the watery eyes.    Loratadine anithistamine pills are to be taken daily.    Eyeglass prescription given.    ANNUAL EYE EXAMINATIONS RECOMMENDED    The affects of the dilating drops last for 4- 6 hours.  You will be more sensitive to light and vision will be blurry up close.  Do not drive if you do not feel comfortable.  Mydriatic sunglasses were given if needed.      Shelia Kirk O.D.  [address] [address]    [phone]

## 2022-03-18 ENCOUNTER — APPOINTMENT (OUTPATIENT)
Dept: OPTOMETRY | Facility: CLINIC | Age: 62
End: 2022-03-18
Payer: COMMERCIAL

## 2022-03-18 PROCEDURE — 92341 FIT SPECTACLES BIFOCAL: CPT | Performed by: OPTOMETRIST

## 2022-05-15 ENCOUNTER — HEALTH MAINTENANCE LETTER (OUTPATIENT)
Age: 62
End: 2022-05-15

## 2022-06-03 ENCOUNTER — OFFICE VISIT (OUTPATIENT)
Dept: URGENT CARE | Facility: URGENT CARE | Age: 62
End: 2022-06-03
Payer: COMMERCIAL

## 2022-06-03 VITALS
DIASTOLIC BLOOD PRESSURE: 66 MMHG | HEART RATE: 69 BPM | BODY MASS INDEX: 19.34 KG/M2 | WEIGHT: 80.2 LBS | RESPIRATION RATE: 12 BRPM | TEMPERATURE: 97.4 F | SYSTOLIC BLOOD PRESSURE: 114 MMHG | OXYGEN SATURATION: 99 %

## 2022-06-03 DIAGNOSIS — R07.0 THROAT PAIN: Primary | ICD-10-CM

## 2022-06-03 DIAGNOSIS — R22.1 NECK MASS: ICD-10-CM

## 2022-06-03 DIAGNOSIS — R09.89 THROAT FULLNESS: ICD-10-CM

## 2022-06-03 DIAGNOSIS — E04.2 MULTINODULAR THYROID: ICD-10-CM

## 2022-06-03 DIAGNOSIS — F41.1 GENERALIZED ANXIETY DISORDER: ICD-10-CM

## 2022-06-03 LAB
DEPRECATED S PYO AG THROAT QL EIA: NEGATIVE
GROUP A STREP BY PCR: NOT DETECTED

## 2022-06-03 PROCEDURE — 99213 OFFICE O/P EST LOW 20 MIN: CPT | Performed by: PHYSICIAN ASSISTANT

## 2022-06-03 PROCEDURE — 87651 STREP A DNA AMP PROBE: CPT | Performed by: PHYSICIAN ASSISTANT

## 2022-06-03 NOTE — PROGRESS NOTES
"SUBJECTIVE:  Gricelda Navarro, a 61 year old female scheduled an appointment to discuss the following issues:    Gricelda is here in the  with her daughter translating for concerns of \"throat pain and fullness.\" She reports that for 3-4 weeks she's had right sided neck and throat pain and fullness. No swallowing difficulty other than fullness. No pain but \"feels irritated.\" Denies risk factors for issues such as recent infection, tobacco or alcohol use.     She's had no other ENT symptoms or GI symptoms. Denies GERD, reflux, etc.    She has some significant anxiety about this and notes she's worried that something is in the throat or tonsil. She has not been eating per daughter because she's so anxious.     Chart review confirms anxiety is an ongoing problem.    Patient Active Problem List   Diagnosis     Recurrent cold sores     Gastritis     Major depression     Generalized anxiety disorder     Seasonal allergic rhinitis     Iron deficiency anemia     CARDIOVASCULAR SCREENING; LDL GOAL LESS THAN 160     Vitamin D deficiency     External hemorrhoids     Internal hemorrhoids     Pelvic floor dysfunction     RLQ abdominal pain     Nephrolithiasis     Left ovarian cyst     Liver masses     Multinodular thyroid     H. pylori infection     Moderate episode of recurrent major depressive disorder (H)     Abnormal pelvic ultrasound     Cysts of both ovaries     Abnormal MRI, pelvis      Current Outpatient Medications   Medication     vitamin D3 (CHOLECALCIFEROL) 125 MCG (5000 UT) tablet     cetirizine (ZYRTEC) 10 MG tablet     loratadine (CLARITIN REDITABS) 10 MG ODT     olopatadine (PATADAY) 0.2 % ophthalmic solution     No current facility-administered medications for this visit.     Facility-Administered Medications Ordered in Other Visits   Medication     gadobutrol (GADAVIST) injection 7.5 mL        Medical, social, surgical, and family histories reviewed.    ROS:  CONSTITUTIONAL: NEGATIVE for fever, chills  EYES: NEGATIVE for " vision changes   RESP: NEGATIVE for significant cough or SOB  CV: NEGATIVE for chest pain, palpitations   GI: NEGATIVE for nausea, abdominal pain, heartburn, or change in bowel habits  : NEGATIVE for frequency, dysuria, or hematuria  MUSCULOSKELETAL: NEGATIVE for significant arthralgias or myalgia  NEURO: NEGATIVE for weakness, dizziness or paresthesias or headache    OBJECTIVE:  /66 (BP Location: Left arm, Patient Position: Sitting, Cuff Size: Adult Regular)   Pulse 69   Temp 97.4  F (36.3  C) (Tympanic)   Resp 12   Wt 36.4 kg (80 lb 3.2 oz)   LMP 12/25/2013   SpO2 99%   BMI 19.34 kg/m    EXAM:  GENERAL APPEARANCE: healthy, alert and no distress  EYES: EOMI,  PERRL  HENT: some right sided submandibular/salivary gland fullness which is subtle on exam, her right SCM muscle is tight and tender and palpation of this does seem to reproduce her pain, otherwise ear canals and TM's normal and nose and mouth without ulcers or lesions  RESP: lungs clear to auscultation - no rales, rhonchi or wheezes  CV: regular rates and rhythm, normal S1 S2, no S3 or S4 and no murmur, click or rub -  ABDOMEN:  soft, nontender, no HSM or masses and bowel sounds normal    ASSESSMENT/PLAN:  (R07.0) Throat pain  (primary encounter diagnosis)  Comment:   Plan: Streptococcus A Rapid Screen w/Reflex to PCR -         Clinic Collect, CT Soft Tissue Neck w Contrast,        Group A Streptococcus PCR Throat Swab       Rapid strep negative.    See below.    (R09.89) Throat fullness  Comment:   Plan: CT Soft Tissue Neck w Contrast          (F41.1) Generalized anxiety disorder  Comment:   Plan:     Exam is fairly benign. I suspect this may be more muscular than anything. She does have some subtle fullness of the right salivary gland, however.     Reviewed options. It is clear that Gricelda is very anxious about this and both her and her daughter are very okay with a further work up to evaluate this. I think considering the fullness and exam,  that is reasonable.    I placed CT scan orders and will update her PCP. Recommend ENT eval if persisting as a direct oropharyngeal visualization cannot be accomplished here and ENT could further reassure after their eval if indicated.    Warning symptoms of worsening condition discussed and patient shows good understanding.    JUSTIN Jain, PA-C

## 2022-06-03 NOTE — Clinical Note
Hi Dr Robert. Nice lady. Very anxious. Ordered a CT simply because of the exam findings and the level of worry she had over this x 3-4 weeks. FYI to you. Thanks! Ashu Chance PA-C / SELWYN Yanez

## 2022-06-10 ENCOUNTER — ANCILLARY PROCEDURE (OUTPATIENT)
Dept: CT IMAGING | Facility: CLINIC | Age: 62
End: 2022-06-10
Attending: PHYSICIAN ASSISTANT
Payer: COMMERCIAL

## 2022-06-10 DIAGNOSIS — R09.89 THROAT FULLNESS: ICD-10-CM

## 2022-06-10 DIAGNOSIS — R07.0 THROAT PAIN: ICD-10-CM

## 2022-06-10 PROCEDURE — 70491 CT SOFT TISSUE NECK W/DYE: CPT | Mod: GC | Performed by: RADIOLOGY

## 2022-06-10 RX ORDER — IOPAMIDOL 755 MG/ML
100 INJECTION, SOLUTION INTRAVASCULAR ONCE
Status: COMPLETED | OUTPATIENT
Start: 2022-06-10 | End: 2022-06-10

## 2022-06-10 RX ADMIN — IOPAMIDOL 100 ML: 755 INJECTION, SOLUTION INTRAVASCULAR at 11:32

## 2022-07-08 ENCOUNTER — OFFICE VISIT (OUTPATIENT)
Dept: FAMILY MEDICINE | Facility: CLINIC | Age: 62
End: 2022-07-08
Payer: COMMERCIAL

## 2022-07-08 VITALS
RESPIRATION RATE: 22 BRPM | OXYGEN SATURATION: 99 % | HEART RATE: 63 BPM | WEIGHT: 81.2 LBS | TEMPERATURE: 97.2 F | DIASTOLIC BLOOD PRESSURE: 61 MMHG | SYSTOLIC BLOOD PRESSURE: 100 MMHG | HEIGHT: 55 IN | BODY MASS INDEX: 18.79 KG/M2

## 2022-07-08 DIAGNOSIS — R22.1 NECK MASS: Primary | ICD-10-CM

## 2022-07-08 DIAGNOSIS — J03.90 TONSILLITIS: ICD-10-CM

## 2022-07-08 DIAGNOSIS — F33.1 MODERATE EPISODE OF RECURRENT MAJOR DEPRESSIVE DISORDER (H): ICD-10-CM

## 2022-07-08 DIAGNOSIS — E04.2 MULTINODULAR THYROID: ICD-10-CM

## 2022-07-08 DIAGNOSIS — Z86.39 HISTORY OF VITAMIN D DEFICIENCY: ICD-10-CM

## 2022-07-08 DIAGNOSIS — Z86.2 HISTORY OF ANEMIA: ICD-10-CM

## 2022-07-08 LAB
ALBUMIN SERPL-MCNC: 3.6 G/DL (ref 3.4–5)
ALP SERPL-CCNC: 105 U/L (ref 40–150)
ALT SERPL W P-5'-P-CCNC: 30 U/L (ref 0–50)
ANION GAP SERPL CALCULATED.3IONS-SCNC: 2 MMOL/L (ref 3–14)
AST SERPL W P-5'-P-CCNC: 21 U/L (ref 0–45)
BASOPHILS # BLD AUTO: 0 10E3/UL (ref 0–0.2)
BASOPHILS NFR BLD AUTO: 1 %
BILIRUB SERPL-MCNC: 0.4 MG/DL (ref 0.2–1.3)
BUN SERPL-MCNC: 11 MG/DL (ref 7–30)
CALCIUM SERPL-MCNC: 9.1 MG/DL (ref 8.5–10.1)
CHLORIDE BLD-SCNC: 109 MMOL/L (ref 94–109)
CO2 SERPL-SCNC: 30 MMOL/L (ref 20–32)
CREAT SERPL-MCNC: 0.49 MG/DL (ref 0.52–1.04)
EOSINOPHIL # BLD AUTO: 0.1 10E3/UL (ref 0–0.7)
EOSINOPHIL NFR BLD AUTO: 1 %
ERYTHROCYTE [DISTWIDTH] IN BLOOD BY AUTOMATED COUNT: 13.1 % (ref 10–15)
FERRITIN SERPL-MCNC: 134 NG/ML (ref 8–252)
GFR SERPL CREATININE-BSD FRML MDRD: >90 ML/MIN/1.73M2
GLUCOSE BLD-MCNC: 87 MG/DL (ref 70–99)
HCT VFR BLD AUTO: 36.3 % (ref 35–47)
HGB BLD-MCNC: 11.9 G/DL (ref 11.7–15.7)
IMM GRANULOCYTES # BLD: 0 10E3/UL
IMM GRANULOCYTES NFR BLD: 0 %
LYMPHOCYTES # BLD AUTO: 1.3 10E3/UL (ref 0.8–5.3)
LYMPHOCYTES NFR BLD AUTO: 33 %
MCH RBC QN AUTO: 28.3 PG (ref 26.5–33)
MCHC RBC AUTO-ENTMCNC: 32.8 G/DL (ref 31.5–36.5)
MCV RBC AUTO: 86 FL (ref 78–100)
MONOCYTES # BLD AUTO: 0.3 10E3/UL (ref 0–1.3)
MONOCYTES NFR BLD AUTO: 8 %
NEUTROPHILS # BLD AUTO: 2.3 10E3/UL (ref 1.6–8.3)
NEUTROPHILS NFR BLD AUTO: 58 %
PLATELET # BLD AUTO: 161 10E3/UL (ref 150–450)
POTASSIUM BLD-SCNC: 3.6 MMOL/L (ref 3.4–5.3)
PROT SERPL-MCNC: 7.7 G/DL (ref 6.8–8.8)
RBC # BLD AUTO: 4.21 10E6/UL (ref 3.8–5.2)
SODIUM SERPL-SCNC: 141 MMOL/L (ref 133–144)
TSH SERPL DL<=0.005 MIU/L-ACNC: 0.63 MU/L (ref 0.4–4)
WBC # BLD AUTO: 4 10E3/UL (ref 4–11)

## 2022-07-08 PROCEDURE — 99214 OFFICE O/P EST MOD 30 MIN: CPT | Performed by: PREVENTIVE MEDICINE

## 2022-07-08 PROCEDURE — 82306 VITAMIN D 25 HYDROXY: CPT | Performed by: PREVENTIVE MEDICINE

## 2022-07-08 PROCEDURE — 82728 ASSAY OF FERRITIN: CPT | Performed by: PREVENTIVE MEDICINE

## 2022-07-08 PROCEDURE — 36415 COLL VENOUS BLD VENIPUNCTURE: CPT | Performed by: PREVENTIVE MEDICINE

## 2022-07-08 PROCEDURE — 80050 GENERAL HEALTH PANEL: CPT | Performed by: PREVENTIVE MEDICINE

## 2022-07-08 RX ORDER — MIRTAZAPINE 15 MG/1
15 TABLET, FILM COATED ORAL AT BEDTIME
Qty: 90 TABLET | Refills: 1 | Status: SHIPPED | OUTPATIENT
Start: 2022-07-08 | End: 2024-06-07

## 2022-07-08 RX ORDER — AMOXICILLIN 875 MG
875 TABLET ORAL 2 TIMES DAILY
Qty: 20 TABLET | Refills: 0 | Status: SHIPPED | OUTPATIENT
Start: 2022-07-08 | End: 2022-07-18

## 2022-07-08 ASSESSMENT — PATIENT HEALTH QUESTIONNAIRE - PHQ9
SUM OF ALL RESPONSES TO PHQ QUESTIONS 1-9: 16
SUM OF ALL RESPONSES TO PHQ QUESTIONS 1-9: 16
10. IF YOU CHECKED OFF ANY PROBLEMS, HOW DIFFICULT HAVE THESE PROBLEMS MADE IT FOR YOU TO DO YOUR WORK, TAKE CARE OF THINGS AT HOME, OR GET ALONG WITH OTHER PEOPLE: VERY DIFFICULT

## 2022-07-08 ASSESSMENT — PAIN SCALES - GENERAL: PAINLEVEL: NO PAIN (1)

## 2022-07-08 NOTE — RESULT ENCOUNTER NOTE
Youa,     Basic blood count is not showing anemia or infection.  Other labs are pending.     Please do not hesitate to call us at [phone] if you have any questions or concerns.    Thank you,    Pooja Francisco MD MPH

## 2022-07-08 NOTE — PROGRESS NOTES
Assessment & Plan     Neck mass  -noted on recent CT scan, biopsy may be needed  -has appointment with ENT on 7/18/22  - Comprehensive metabolic panel    Multinodular thyroid  -long standing issue  -has been seen by Robbie and had FNA done, work up was benign   - TSH with free T4 reflex  - Comprehensive metabolic panel    Tonsillitis  -noted on CT scan, hence will start a course of antibiotics   - amoxicillin (AMOXIL) 875 MG tablet  Dispense: 20 tablet; Refill: 0    Moderate episode of recurrent major depressive disorder (H)  -stopped medication, unclear why  -restarted Remeron   - mirtazapine (REMERON) 15 MG tablet  Dispense: 90 tablet; Refill: 1    History of anemia  -was requesting refills on iron, discussed checking labs first and then deciding if iron supplementation is needed   - Ferritin  - Comprehensive metabolic panel  - CBC with Platelets & Differential    History of vitamin D deficiency  - Vitamin D Deficiency      Ordering of each unique test  Prescription drug management  40 minutes spent on the date of the encounter doing chart review, history and exam, documentation and further activities per the note       Depression Screening Follow Up    PHQ 7/8/2022   PHQ-9 Total Score 16   Q9: Thoughts of better off dead/self-harm past 2 weeks Clarified with interpretor present, does NOT have thoughts of self harm    F/U: Thoughts of suicide or self-harm No   F/U: Safety concerns No           Return in about 3 months (around 10/8/2022) for Follow up, with any available provider.    Pooja Francisco MD MPH    [facility]    Jessica Madison is a 61 year old here with family member presenting for the following health issues:  Throat Problem (Biopsy)      History of Present Illness       Reason for visit:  Biopsy    She eats 0-1 servings of fruits and vegetables daily.She consumes 1 sweetened beverage(s) daily.She exercises with enough effort to increase her heart rate 10 to 19 minutes per  "day.  She exercises with enough effort to increase her heart rate 3 or less days per week. She is missing 2 dose(s) of medications per week.    Today's PHQ-9         PHQ-9 Total Score: 16    PHQ-9 Q9 Thoughts of better off dead/self-harm past 2 weeks :   More than half the days  Thoughts of suicide or self harm: (P) No  Self-harm Plan:     Self-harm Action:       Safety concerns for self or others: (P) No    How difficult have these problems made it for you to do your work, take care of things at home, or get along with other people: Very difficult     Visit done with [employer] telephone interpretor    Patient was under the impression she was going to have a biopsy today, explained that once she sees the ENT specialist then that determination will be made.    Has had moderate to severe depression since 2014:  -looks like PHQ not filled out correctly. Clarified and does NOT Have thoughts of self harm.    -used to be on Mirtazapine that she stopped. Unclear why she stopped       Seen in Urgent care 6/3/22:    \"throat pain and fullness.\" She reports that for 3-4 weeks she's had right sided neck and throat pain and fullness. No swallowing difficulty other than fullness. No pain but \"feels irritated.\" Denies risk factors for issues such as recent infection, tobacco or alcohol use.      She's had no other ENT symptoms or GI symptoms. Denies GERD, reflux, etc.     She has some significant anxiety about this and notes she's worried that something is in the throat or tonsil. She has not been eating per daughter because she's so anxious.     Strep test was negative.    CT soft tissue done 6/10/22:    IMPRESSION:  1. Multinodular thyroid.   2. Mucosal thickening enhancement in the nasopharynx with effacement  of the right Rosenmuller fossa. This likely represent pharyngitis.  Enlarged tonsils with parenchymal heterogeneity and enhancement,  suggestive of tonsillitis.  3. 1.8 x 2.1 cm subcutaneous isodense mass in the posterior " "inferior  neck with internal calcification. This is indeterminant. Consider  biopsy.    History of thyroid issues in the past, has had FNA done in 2019, work up was negative. Thus, was referred to General surgery at that time. I do not see any records of General surgery evaluation.   Has had dysphagia as far back as 2019 if not longer.    Wt Readings from Last 2 Encounters:   07/08/22 36.8 kg (81 lb 3.2 oz)   06/03/22 36.4 kg (80 lb 3.2 oz)     Weight has been in the 80 pound range since 2014.            Review of Systems   Constitutional, HEENT, cardiovascular, pulmonary, gi and gu systems are negative, except as otherwise noted.      Objective    /61 (BP Location: Left arm, Patient Position: Sitting, Cuff Size: Adult Small)   Pulse 63   Temp 97.2  F (36.2  C) (Tympanic)   Resp 22   Ht 1.372 m (4' 6\")   Wt 36.8 kg (81 lb 3.2 oz)   LMP 12/25/2013   SpO2 99%   BMI 19.58 kg/m    Body mass index is 19.58 kg/m .  Physical Exam   GENERAL APPEARANCE: Frail, very flat affect   EYES: Eyes grossly normal to inspection and conjunctivae and sclerae normal  HENT: nose and mouth without ulcers or lesions  NECK: fullness and tightness noted on the right side of the neck   RESP: lungs clear to auscultation - no rales, rhonchi or wheezes  CV: regular rates and rhythm, normal S1 S2  ABDOMEN: soft, non-tender and no rebound or guarding   MS: extremities normal- no gross deformities noted and peripheral pulses normal  SKIN: no suspicious lesions or rashes  NEURO: Normal strength and tone, mentation intact and speech normal  PSYCH: mentation appears normal      Results for orders placed or performed in visit on 07/08/22   CBC with Platelets & Differential     Status: None (In process)    Narrative    The following orders were created for panel order CBC with Platelets & Differential.  Procedure                               Abnormality         Status                     ---------                               -----------  "        ------                     CBC with platelets and d...[[id number]]                      In process                   Please view results for these tests on the individual orders.                 .  ..

## 2022-07-08 NOTE — PATIENT INSTRUCTIONS
At [facility], we strive to deliver an exceptional experience to you, every time we see you. If you receive a survey, please complete it as we do value your feedback.  If you have MyChart, you can expect to receive results automatically within 24 hours of their completion.  Your provider will send a note interpreting your results as well.   If you do not have MyChart, you should receive your results in about a week by mail.    Your care team:                            Family Medicine Internal Medicine   MD Skinny Evans MD Shantel Branch-Fleming, MD Srinivasa Vaka, MD Katya Belousova, GREGORY Werner CNP, MD ([facility]   Itz Morales, MD Aurea Mathis MD Amelia Massimini APRN CNP Kim Thein, APRN CNP Bethany Templen, MD             Clinic hours: Monday - Thursday 7 am-6 pm; Fridays 7 am-5 pm.   Urgent care: Monday - Friday 10 am- 8 pm; Saturday and Sunday 9 am-5 pm.    Clinic: [phone]       [facility]: Monday - Thursday 8 am - 7 pm; Friday 8 am - 6 pm  [facility]: [phone]

## 2022-07-11 LAB — DEPRECATED CALCIDIOL+CALCIFEROL SERPL-MC: 9 UG/L (ref 20–75)

## 2022-07-12 DIAGNOSIS — E55.9 VITAMIN D DEFICIENCY: Primary | ICD-10-CM

## 2022-07-12 NOTE — RESULT ENCOUNTER NOTE
Youa,    Your vitamin D level was low. Maintaining adequate levels is very important for good health. Low levels can cause fatigue and joint pains. I recommend starting high dose vitamin D.  I have sent a prescription for vitamin D 50,000IU to your pharmacy for you to . You should take this once weekly for 8 weeks, then take over the counter Vitamin D3 at a dose of 2000 units daily. We will recheck labs in 3 months.     Thyroid function, iron stores, electrolytes, glucose, kidney function, and liver function tests are normal.    Please do not hesitate to call us at [phone] if you have any questions or concerns.    Thank you,    Pooja Francisco MD MPH

## 2022-07-14 NOTE — PROGRESS NOTES
History of Present Illness - Gricelda Navarro is a very pleasant 62 year old female being seen for the first time for chronic throat pain and pain on the RIGHT side of the neck.  She is here with her daughter who helps to translate    She tells me that this all started months ago, in May, when she started feeling a foreign body sensation in the throat, especially when she is eating.  At this point it is constant, always on the RIGHT side.  NO coughing up blood, no weight loss, but there has been a lump noted in the RIGHT side of the neck.  She also notes that she has started to have thicker saliva as well.    A CT scan of the neck soft tissues with contrast was done on 6/10/2022 and personally reviewed. There are significant findings, with a fullness seen in the RIGHT nasopharynx, as well as a 2cm isodense mass of the RIGHT posterior neck, with some calcifciations.  Otherwise, some sub-1.5cm nodules of the thyroid bilaterally.  Otherwise the upper aerodigestive tract was normal.        Past Medical History -   Patient Active Problem List   Diagnosis     Recurrent cold sores     Gastritis     Major depression     Generalized anxiety disorder     Seasonal allergic rhinitis     Iron deficiency anemia     CARDIOVASCULAR SCREENING; LDL GOAL LESS THAN 160     Vitamin D deficiency     External hemorrhoids     Internal hemorrhoids     Pelvic floor dysfunction     RLQ abdominal pain     Nephrolithiasis     Left ovarian cyst     Liver masses     Multinodular thyroid     H. pylori infection     Moderate episode of recurrent major depressive disorder (H)     Abnormal pelvic ultrasound     Cysts of both ovaries     Abnormal MRI, pelvis       Current Medications -   Current Outpatient Medications:      amoxicillin (AMOXIL) 875 MG tablet, Take 1 tablet (875 mg) by mouth 2 times daily for 10 days, Disp: 20 tablet, Rfl: 0     cetirizine (ZYRTEC) 10 MG tablet, Take 1 tablet (10 mg) by mouth daily (Patient not taking: No sig reported), Disp:  90 tablet, Rfl: 0     loratadine (CLARITIN REDITABS) 10 MG ODT, Take 1 tablet (10 mg) by mouth daily (Patient not taking: No sig reported), Disp: 90 tablet, Rfl: 3     mirtazapine (REMERON) 15 MG tablet, Take 1 tablet (15 mg) by mouth At Bedtime, Disp: 90 tablet, Rfl: 1     olopatadine (PATADAY) 0.2 % ophthalmic solution, Place 1 drop into both eyes daily (Patient not taking: No sig reported), Disp: 2.5 mL, Rfl: 11     vitamin D3 (CHOLECALCIFEROL) 1.25 MG (68134 UT) capsule, Take 1 capsule (50,000 Units) by mouth every 7 days for 8 doses, Disp: 8 capsule, Rfl: 0     vitamin D3 (CHOLECALCIFEROL) 125 MCG (5000 UT) tablet, Take 1 tablet (125 mcg) by mouth daily, Disp: 30 tablet, Rfl: 3  No current facility-administered medications for this visit.    Facility-Administered Medications Ordered in Other Visits:      gadobutrol (GADAVIST) injection 7.5 mL, 7.5 mL, Intravenous, Once, Rosalinda Bradshaw APRN CNP    Allergies -   Allergies   Allergen Reactions     Cortisone Itching, Swelling and Rash     Cyclobenzaprine Rash     Gabapentin Itching     Hydrocodone Itching     Prednisone Hives       Social History -   Social History     Socioeconomic History     Marital status:      Spouse name: Gricelda     Number of children: 6   Tobacco Use     Smoking status: Never Smoker     Smokeless tobacco: Never Used   Substance and Sexual Activity     Alcohol use: No     Drug use: No     Sexual activity: Yes     Partners: Male     Birth control/protection: Female Surgical, Post-menopausal   Social History Narrative    Lives with 3 daughters.     Works around the house and in the garden.     Susy Molina MD     Social Determinants of Health     Financial Resource Strain: Low Risk      Difficulty of Paying Living Expenses: Not hard at all   Food Insecurity: No Food Insecurity     Worried About Running Out of Food in the Last Year: Never true     Ran Out of Food in the Last Year: Never true   Transportation Needs: No  Transportation Needs     Lack of Transportation (Medical): No     Lack of Transportation (Non-Medical): No       Family History -   Family History   Problem Relation Age of Onset     Unknown/Adopted Mother      Unknown/Adopted Father        Review of Systems - As per HPI and PMHx, otherwise 10+ system review of the head and neck, and general constitution is negative.    Physical Exam  B/P: Data Unavailable, T: Data Unavailable, P: Data Unavailable, R: Data Unavailable  Vitals: LMP 12/25/2013   BMI= There is no height or weight on file to calculate BMI.    General - The patient is well nourished and well developed, and appears to have good nutritional status.  Alert and oriented to person and place, answers questions and cooperates with examination appropriately.   Head and Face - Normocephalic and atraumatic, with no gross asymmetry noted of the contour of the facial features.  The facial nerve is intact, with strong symmetric movements.  Voice and Breathing - The patient was breathing comfortably without the use of accessory muscles. There was no wheezing, stridor, or stertor.  The patients voice was clear and strong, and had appropriate pitch and quality.  Ears - The tympanic membranes are normal in appearance, bony landmarks are intact.  No retraction, perforation, or masses.  No fluid or purulence was seen in the external canal or the middle ear. No evidence of infection of the middle ear or external canal, cerumen was normal in appearance.  Eyes - Extraocular movements intact, and the pupils were reactive to light.  Sclera were not icteric or injected, conjunctiva were pink and moist.  Mouth - Examination of the oral cavity showed pink, healthy oral mucosa. No lesions or ulcerations noted.  The tongue was mobile and midline, and the dentition were in good condition.    Throat - The walls of the oropharynx were smooth, pink, moist, symmetric, and had no lesions or ulcerations.  The tonsillar pillars and soft  palate were symmetric.  The uvula was midline on elevation.    Neck - Normal midline excursion of the laryngotracheal complex during swallowing.  Full range of motion on passive movement.  The node mentioned in the CT report is not palpable today. Palpation of the occipital, submental, submandibular, internal jugular chain, and supraclavicular nodes did not demonstrate any abnormal lymph nodes or masses.  The carotid pulse was palpable bilaterally.  Palpation of the thyroid was soft and smooth, with no nodules or goiter appreciated.  The trachea was mobile and midline.  Nose - External contour is symmetric, no gross deflection or scars.  Nasal mucosa is pink and moist with no abnormal mucus.  The septum was midline and non-obstructive, turbinates of normal size and position.  No polyps, masses, or purulence noted on examination.    Flexible Endoscopy -    Attempts at mirror laryngoscopy were not possible due to gag reflex.  Therefore I proceeded with a fiberoptic examination.  First I sprayed both sides of the nose with a mixture of lidocaine and neosynephrine.  I then passed the scope through the nasal cavity.  Color photographs were taken for the permanent medical record.  The nasal cavity was unremarkable.  The nasopharynx was mucosally covered and symmetric.  The Eustachian tube openings were unobstructed.  Going further down I had a clear view of the base of tongue, which had normal appearing lingual tonsillar tissue.  The base of tongue was free of lesions, and the vallecula was open.  The epiglottis was smooth and mucosally covered.  The supraglottic larynx was then clearly visualized.  The vocal cords moved smoothly and symmetrically, they were slightly edematous but pearly white and no lesions were seen.  I did note a significant amount of edema and redundant mucosa in the interarytenoid soft tissues and posterior surface of the larynx.  The pyriform sinuses were open, and the limited view of the  postcricoid region did not show any erosive or mass lesions.      LEFT nasopharynx      RIGHT nasopharynx      Pharynx and hypopharynx, and larynx        RIGHT pharyngeal wall        A/P - Gricelda Navarro is a 62 year old female  (R07.0) Throat pain  (R09.89) Throat fullness  (E04.2) Multinodular thyroid  (R22.1) Neck mass    This is not a totally straightforward situation.  On review of the CT neck, I was expecting the possibility of NP malignancy, but the endoscopy looks quite normal, other than signs of laryngopharyngeal reflux.    She had her thyroid biopsied by Dr Holden in 2019.    I will start reflux medication, but I am definitely not completely satisfied that we are not dealing with malignancy.  We need more work up.    I want to refer to Dr Mcwilliams for US guided feliberto cut needle biopsy of the posterior neck node. But of note, I cannot feel it clearly on exam today.    Also, I will trust Dr Mcwilliams's opinion on whether or not the thyroid needs to be biopsied again.

## 2022-07-18 ENCOUNTER — OFFICE VISIT (OUTPATIENT)
Dept: OTOLARYNGOLOGY | Facility: CLINIC | Age: 62
End: 2022-07-18
Attending: PHYSICIAN ASSISTANT
Payer: COMMERCIAL

## 2022-07-18 VITALS
WEIGHT: 81 LBS | SYSTOLIC BLOOD PRESSURE: 110 MMHG | DIASTOLIC BLOOD PRESSURE: 66 MMHG | HEART RATE: 66 BPM | OXYGEN SATURATION: 98 % | BODY MASS INDEX: 19.53 KG/M2

## 2022-07-18 DIAGNOSIS — R07.0 THROAT PAIN: ICD-10-CM

## 2022-07-18 DIAGNOSIS — E04.2 MULTINODULAR THYROID: ICD-10-CM

## 2022-07-18 DIAGNOSIS — E04.1 THYROID NODULE: Primary | ICD-10-CM

## 2022-07-18 DIAGNOSIS — R09.89 THROAT FULLNESS: ICD-10-CM

## 2022-07-18 DIAGNOSIS — R22.1 NECK MASS: ICD-10-CM

## 2022-07-18 PROCEDURE — 99204 OFFICE O/P NEW MOD 45 MIN: CPT | Mod: 25 | Performed by: OTOLARYNGOLOGY

## 2022-07-18 PROCEDURE — 31575 DIAGNOSTIC LARYNGOSCOPY: CPT | Performed by: OTOLARYNGOLOGY

## 2022-07-18 RX ORDER — OMEPRAZOLE 40 MG/1
40 CAPSULE, DELAYED RELEASE ORAL DAILY
Qty: 90 CAPSULE | Refills: 3 | Status: SHIPPED | OUTPATIENT
Start: 2022-07-18 | End: 2023-10-20

## 2022-07-18 NOTE — NURSING NOTE
"Chief Complaint   Patient presents with     Consult     Fullness in neck and trouble swallowing        Vitals:    07/18/22 1020   BP: 110/66   BP Location: Right arm   Patient Position: Sitting   Cuff Size: Child   Pulse: 66   SpO2: 98%   Weight: 36.7 kg (81 lb)     Wt Readings from Last 1 Encounters:   07/18/22 36.7 kg (81 lb)     Ht Readings from Last 1 Encounters:   07/08/22 1.372 m (4' 6\")       Gloria Alvarado [facility], 7/18/2022 10:22 AM    "

## 2022-07-18 NOTE — LETTER
7/18/2022         RE: Gricelda Navarro  [address] [facility] [place] [address]        Dear Colleague,    Thank you for referring your patient, Gricelda Navarro, to the [facility]. Please see a copy of my visit note below.    History of Present Illness - Gricelda Navarro is a very pleasant 62 year old female being seen for the first time for chronic throat pain and pain on the RIGHT side of the neck.  She is here with her daughter who helps to translate    She tells me that this all started months ago, in May, when she started feeling a foreign body sensation in the throat, especially when she is eating.  At this point it is constant, always on the RIGHT side.  NO coughing up blood, no weight loss, but there has been a lump noted in the RIGHT side of the neck.  She also notes that she has started to have thicker saliva as well.    A CT scan of the neck soft tissues with contrast was done on 6/10/2022 and personally reviewed. There are significant findings, with a fullness seen in the RIGHT nasopharynx, as well as a 2cm isodense mass of the RIGHT posterior neck, with some calcifciations.  Otherwise, some sub-1.5cm nodules of the thyroid bilaterally.  Otherwise the upper aerodigestive tract was normal.        Past Medical History -   Patient Active Problem List   Diagnosis     Recurrent cold sores     Gastritis     Major depression     Generalized anxiety disorder     Seasonal allergic rhinitis     Iron deficiency anemia     CARDIOVASCULAR SCREENING; LDL GOAL LESS THAN 160     Vitamin D deficiency     External hemorrhoids     Internal hemorrhoids     Pelvic floor dysfunction     RLQ abdominal pain     Nephrolithiasis     Left ovarian cyst     Liver masses     Multinodular thyroid     H. pylori infection     Moderate episode of recurrent major depressive disorder (H)     Abnormal pelvic ultrasound     Cysts of both ovaries     Abnormal MRI, pelvis       Current Medications -   Current Outpatient Medications:       amoxicillin (AMOXIL) 875 MG tablet, Take 1 tablet (875 mg) by mouth 2 times daily for 10 days, Disp: 20 tablet, Rfl: 0     cetirizine (ZYRTEC) 10 MG tablet, Take 1 tablet (10 mg) by mouth daily (Patient not taking: No sig reported), Disp: 90 tablet, Rfl: 0     loratadine (CLARITIN REDITABS) 10 MG ODT, Take 1 tablet (10 mg) by mouth daily (Patient not taking: No sig reported), Disp: 90 tablet, Rfl: 3     mirtazapine (REMERON) 15 MG tablet, Take 1 tablet (15 mg) by mouth At Bedtime, Disp: 90 tablet, Rfl: 1     olopatadine (PATADAY) 0.2 % ophthalmic solution, Place 1 drop into both eyes daily (Patient not taking: No sig reported), Disp: 2.5 mL, Rfl: 11     vitamin D3 (CHOLECALCIFEROL) 1.25 MG (79367 UT) capsule, Take 1 capsule (50,000 Units) by mouth every 7 days for 8 doses, Disp: 8 capsule, Rfl: 0     vitamin D3 (CHOLECALCIFEROL) 125 MCG (5000 UT) tablet, Take 1 tablet (125 mcg) by mouth daily, Disp: 30 tablet, Rfl: 3  No current facility-administered medications for this visit.    Facility-Administered Medications Ordered in Other Visits:      gadobutrol (GADAVIST) injection 7.5 mL, 7.5 mL, Intravenous, Once, Rosalinda Bradshaw APRN CNP    Allergies -   Allergies   Allergen Reactions     Cortisone Itching, Swelling and Rash     Cyclobenzaprine Rash     Gabapentin Itching     Hydrocodone Itching     Prednisone Hives       Social History -   Social History     Socioeconomic History     Marital status:      Spouse name: Gricelda     Number of children: 6   Tobacco Use     Smoking status: Never Smoker     Smokeless tobacco: Never Used   Substance and Sexual Activity     Alcohol use: No     Drug use: No     Sexual activity: Yes     Partners: Male     Birth control/protection: Female Surgical, Post-menopausal   Social History Narrative    Lives with 3 daughters.     Works around the house and in the garden.     Susy Molina MD     Social Determinants of Health     Financial Resource Strain: Low Risk       Difficulty of Paying Living Expenses: Not hard at all   Food Insecurity: No Food Insecurity     Worried About Running Out of Food in the Last Year: Never true     Ran Out of Food in the Last Year: Never true   Transportation Needs: No Transportation Needs     Lack of Transportation (Medical): No     Lack of Transportation (Non-Medical): No       Family History -   Family History   Problem Relation Age of Onset     Unknown/Adopted Mother      Unknown/Adopted Father        Review of Systems - As per HPI and PMHx, otherwise 10+ system review of the head and neck, and general constitution is negative.    Physical Exam  B/P: Data Unavailable, T: Data Unavailable, P: Data Unavailable, R: Data Unavailable  Vitals: LMP 12/25/2013   BMI= There is no height or weight on file to calculate BMI.    General - The patient is well nourished and well developed, and appears to have good nutritional status.  Alert and oriented to person and place, answers questions and cooperates with examination appropriately.   Head and Face - Normocephalic and atraumatic, with no gross asymmetry noted of the contour of the facial features.  The facial nerve is intact, with strong symmetric movements.  Voice and Breathing - The patient was breathing comfortably without the use of accessory muscles. There was no wheezing, stridor, or stertor.  The patients voice was clear and strong, and had appropriate pitch and quality.  Ears - The tympanic membranes are normal in appearance, bony landmarks are intact.  No retraction, perforation, or masses.  No fluid or purulence was seen in the external canal or the middle ear. No evidence of infection of the middle ear or external canal, cerumen was normal in appearance.  Eyes - Extraocular movements intact, and the pupils were reactive to light.  Sclera were not icteric or injected, conjunctiva were pink and moist.  Mouth - Examination of the oral cavity showed pink, healthy oral mucosa. No lesions or  ulcerations noted.  The tongue was mobile and midline, and the dentition were in good condition.    Throat - The walls of the oropharynx were smooth, pink, moist, symmetric, and had no lesions or ulcerations.  The tonsillar pillars and soft palate were symmetric.  The uvula was midline on elevation.    Neck - Normal midline excursion of the laryngotracheal complex during swallowing.  Full range of motion on passive movement.  Palpation of the occipital, submental, submandibular, internal jugular chain, and supraclavicular nodes did not demonstrate any abnormal lymph nodes or masses.  The carotid pulse was palpable bilaterally.  Palpation of the thyroid was soft and smooth, with no nodules or goiter appreciated.  The trachea was mobile and midline.  Nose - External contour is symmetric, no gross deflection or scars.  Nasal mucosa is pink and moist with no abnormal mucus.  The septum was midline and non-obstructive, turbinates of normal size and position.  No polyps, masses, or purulence noted on examination.    Flexible Endoscopy -    Attempts at mirror laryngoscopy were not possible due to gag reflex.  Therefore I proceeded with a fiberoptic examination.  First I sprayed both sides of the nose with a mixture of lidocaine and neosynephrine.  I then passed the scope through the nasal cavity.  Color photographs were taken for the permanent medical record.  The nasal cavity was unremarkable.  The nasopharynx was mucosally covered and symmetric.  The Eustachian tube openings were unobstructed.  Going further down I had a clear view of the base of tongue, which had normal appearing lingual tonsillar tissue.  The base of tongue was free of lesions, and the vallecula was open.  The epiglottis was smooth and mucosally covered.  The supraglottic larynx was then clearly visualized.  The vocal cords moved smoothly and symmetrically, they were slightly edematous but pearly white and no lesions were seen.  I did note a  significant amount of edema and redundant mucosa in the interarytenoid soft tissues and posterior surface of the larynx.  The pyriform sinuses were open, and the limited view of the postcricoid region did not show any erosive or mass lesions.        A/P - Gricelda Navarro is a 62 year old female  (R07.0) Throat pain  (R09.89) Throat fullness  (E04.2) Multinodular thyroid  (R22.1) Neck mass    This is not a totally straightforward situation.  On review of the CT neck, I was expecting the possibility of NP malignancy, but the endoscopy looks quite normal, other than signs of laryngopharyngeal reflux.    She had her thyroid biopsied by Dr Holden in 2019.    I will start reflux medication, but I am definitely not completely satisfied that we are not dealing with malignancy.  We need more work up.    I want to refer to Dr Mcwilliams for US guided feliberto cut needle biopsy of the posterior neck node. But of note, I cannot feel it clearly on exam today.    Also, I will trust Dr Mcwilliams's opinion on whether or not the thyroid needs to be biopsied again.        Again, thank you for allowing me to participate in the care of your patient.        Sincerely,        Michael Jeong MD

## 2022-08-02 ENCOUNTER — ANCILLARY PROCEDURE (OUTPATIENT)
Dept: ULTRASOUND IMAGING | Facility: CLINIC | Age: 62
End: 2022-08-02
Attending: SURGERY
Payer: COMMERCIAL

## 2022-08-02 DIAGNOSIS — E04.1 THYROID NODULE: ICD-10-CM

## 2022-08-02 PROCEDURE — 76536 US EXAM OF HEAD AND NECK: CPT | Mod: TC | Performed by: RADIOLOGY

## 2022-08-26 DIAGNOSIS — E04.1 THYROID NODULE: Primary | ICD-10-CM

## 2022-09-09 ENCOUNTER — ANCILLARY PROCEDURE (OUTPATIENT)
Dept: ULTRASOUND IMAGING | Facility: CLINIC | Age: 62
End: 2022-09-09
Attending: SURGERY
Payer: COMMERCIAL

## 2022-09-09 DIAGNOSIS — E04.1 THYROID NODULE: ICD-10-CM

## 2022-09-09 PROCEDURE — 88173 CYTOPATH EVAL FNA REPORT: CPT | Mod: GC | Performed by: PATHOLOGY

## 2022-09-09 PROCEDURE — 10005 FNA BX W/US GDN 1ST LES: CPT

## 2022-09-10 ENCOUNTER — HEALTH MAINTENANCE LETTER (OUTPATIENT)
Age: 62
End: 2022-09-10

## 2022-09-12 DIAGNOSIS — E04.1 THYROID NODULE: Primary | ICD-10-CM

## 2022-09-12 LAB
PATH REPORT.COMMENTS IMP SPEC: NORMAL
PATH REPORT.COMMENTS IMP SPEC: NORMAL
PATH REPORT.FINAL DX SPEC: NORMAL
PATH REPORT.GROSS SPEC: NORMAL
PATH REPORT.MICROSCOPIC SPEC OTHER STN: NORMAL
PATH REPORT.RELEVANT HX SPEC: NORMAL

## 2022-09-22 ENCOUNTER — TELEPHONE (OUTPATIENT)
Dept: FAMILY MEDICINE | Facility: CLINIC | Age: 62
End: 2022-09-22

## 2022-09-22 NOTE — TELEPHONE ENCOUNTER
Patient Quality Outreach    Patient is due for the following:   Breast Cancer Screening - Mammogram  Cervical Cancer Screening - PAP Needed      Topic Date Due     Diptheria Tetanus Pertussis (DTAP/TDAP/TD) Vaccine (1 - Tdap) Never done     Zoster (Shingles) Vaccine (1 of 2) Never done     COVID-19 Vaccine (4 - Booster for Pfizer series) 03/24/2022     Flu Vaccine (1) 09/01/2022         Type of outreach:    Sent We[employer] message. and Sent letter.    Next Steps:  Reach out within 90 days via Fanbase[employer] and Letter.    Max number of attempts reached: Yes. Will try again in 90 days if patient still on fail list.    Questions for provider review:    None     Frandy Toro MA  Chart routed to Provider.

## 2022-09-22 NOTE — LETTER
[facility]  [address] [facility] [address]  [phone]  Dept: [phone]    September 22, 2022    Gricelda Navarro  [address] [facility] [place] [address]    Dear Gricelda Navarro,     At [facility] we care about your health and are committed to providing quality patient care.    Which includes staying current on preventive cancer screenings.  You can increase your chances of finding and treating cancers through regular screenings.      Our records indicate you may be due for the following preventive screening(s):    Mammogram    Mammogram for breast cancer   Recommended every 1-2 years for women age 50 and older  Mammograms help detect breast cancer, which is the most common cancer among women in the [place].  You may need to start having mammograms earlier and more often if you have had breast cancer, breast problems, or a family history of breast cancer.     Cervical Cancer Screening    Pap smear is a screening test used to detect cervical cancer. It is recommended every three years for women 21 and older. The test should be done at least once every three years but women who are at greater risk for cervical cancer may need to have the test more often.  Immunizations    To schedule an appointment or discuss this screening further, you may contact us by phone at the [facility] at [phone] or online through the patient portal/[email or web address]    If you have had any of the screenings listed above at another facility, please call us so that we may update your chart.      Your partners in health,      Quality Committee at [facility]

## 2022-12-21 ENCOUNTER — TELEPHONE (OUTPATIENT)
Dept: FAMILY MEDICINE | Facility: CLINIC | Age: 62
End: 2022-12-21

## 2022-12-21 NOTE — TELEPHONE ENCOUNTER
Patient Quality Outreach    Patient is due for the following:   Breast Cancer Screening - Mammogram    Next Steps:   Schedule a office visit for Mammogram    Type of outreach:    Sent [employer] message. and Sent letter.      Questions for provider review:    None     Itzel Trejo MA

## 2022-12-21 NOTE — LETTER
December 21, 2022    Gricelda Navarro  [address] [facility] [place] [address]    Dear Gricelda Navarro,     At [facility] we care about your health and are committed to providing quality patient care.    Which includes staying current on preventive cancer screenings.  You can increase your chances of finding and treating cancers through regular screenings.      Our records indicate you may be due for the following preventive screening(s):    Mammogram    Mammogram for breast cancer   Recommended every 1-2 years for women age 50 and older  Mammograms help detect breast cancer, which is the most common cancer among women in the [place].  You may need to start having mammograms earlier and more often if you have had breast cancer, breast problems, or a family history of breast cancer.     To schedule an appointment or discuss this screening further, you may contact us by phone at the [facility] at [phone] or online through the patient portal/[email or web address] [email or web address]    If you have had any of the screenings listed above at another facility, please call us so that we may update your chart.      Your partners in health,      Quality Committee at [facility]

## 2023-01-13 ENCOUNTER — ANCILLARY PROCEDURE (OUTPATIENT)
Dept: MAMMOGRAPHY | Facility: CLINIC | Age: 63
End: 2023-01-13
Attending: FAMILY MEDICINE
Payer: COMMERCIAL

## 2023-01-13 DIAGNOSIS — Z12.31 VISIT FOR SCREENING MAMMOGRAM: ICD-10-CM

## 2023-01-13 PROCEDURE — 77067 SCR MAMMO BI INCL CAD: CPT | Mod: GC | Performed by: RADIOLOGY

## 2023-01-25 ENCOUNTER — TELEPHONE (OUTPATIENT)
Dept: FAMILY MEDICINE | Facility: CLINIC | Age: 63
End: 2023-01-25
Payer: COMMERCIAL

## 2023-01-25 NOTE — TELEPHONE ENCOUNTER
.Reason for call:  Form   Our goal is to have forms completed within 72 hours, however some forms may require a visit or additional information.     Who is the form from? Patient  Where did the form come from? Patient or family brought in     What clinic location was the form placed at? [place]  Where was the form placed? [email or web address]  What number is listed as a contact on the form? [phone]    Phone call message - patient request for a letter, form or note: Phone call    Date needed: as soon as possible  Patient will  at the clinic when completed  Has the patient signed a consent form for release of information? Not Applicable    Additional comments: Please call daughter [phone] if can't reach patient.     Type of letter, form or note: medical    Phone number to reach patient:  Cell number on file:    Telephone Information:   Mobile [phone]       Best Time:     Can we leave a detailed message on this number?  YES    Travel screening: Not Applicable

## 2023-01-27 ENCOUNTER — APPOINTMENT (OUTPATIENT)
Dept: OPTOMETRY | Facility: CLINIC | Age: 63
End: 2023-01-27
Payer: COMMERCIAL

## 2023-01-27 PROCEDURE — 92340 FIT SPECTACLES MONOFOCAL: CPT | Performed by: OPTOMETRIST

## 2023-01-30 NOTE — TELEPHONE ENCOUNTER
Copy placed in abstract and tc bin. Original placed at the  for pt to . Called pt and relayed message.

## 2023-06-03 ENCOUNTER — HEALTH MAINTENANCE LETTER (OUTPATIENT)
Age: 63
End: 2023-06-03

## 2023-06-16 ENCOUNTER — TELEPHONE (OUTPATIENT)
Dept: FAMILY MEDICINE | Facility: CLINIC | Age: 63
End: 2023-06-16
Payer: COMMERCIAL

## 2023-06-16 NOTE — TELEPHONE ENCOUNTER
Patient Quality Outreach    Patient is due for the following:   Depression  -  PHQ-9 needed  Physical Preventive Adult Physical      Topic Date Due     Diptheria Tetanus Pertussis (DTAP/TDAP/TD) Vaccine (1 - Tdap) Never done     Zoster (Shingles) Vaccine (1 of 2) Never done     COVID-19 Vaccine (4 - Pfizer series) 03/24/2022       Next Steps:   Schedule a Adult Preventative    Type of outreach:    Sent [employer] message.    Next Steps:  Reach out within 90 days via Phone.    Max number of attempts reached: No. Will try again in 90 days if patient still on fail list.    Questions for provider review:    None           Shlomo Marquez MA

## 2023-08-11 ENCOUNTER — OFFICE VISIT (OUTPATIENT)
Dept: URGENT CARE | Facility: URGENT CARE | Age: 63
End: 2023-08-11
Payer: COMMERCIAL

## 2023-08-11 VITALS
SYSTOLIC BLOOD PRESSURE: 103 MMHG | OXYGEN SATURATION: 99 % | BODY MASS INDEX: 20.16 KG/M2 | WEIGHT: 83.6 LBS | TEMPERATURE: 97.8 F | DIASTOLIC BLOOD PRESSURE: 52 MMHG | RESPIRATION RATE: 20 BRPM | HEART RATE: 72 BPM

## 2023-08-11 DIAGNOSIS — L50.9 HIVES: Primary | ICD-10-CM

## 2023-08-11 DIAGNOSIS — D50.9 IRON DEFICIENCY ANEMIA, UNSPECIFIED IRON DEFICIENCY ANEMIA TYPE: ICD-10-CM

## 2023-08-11 PROCEDURE — 99214 OFFICE O/P EST MOD 30 MIN: CPT | Performed by: PHYSICIAN ASSISTANT

## 2023-08-11 RX ORDER — DIPHENHYDRAMINE HCL 25 MG
25 TABLET ORAL
Qty: 30 TABLET | Refills: 0 | Status: SHIPPED | OUTPATIENT
Start: 2023-08-11 | End: 2023-09-10

## 2023-08-11 RX ORDER — CETIRIZINE HYDROCHLORIDE 10 MG/1
10 TABLET ORAL DAILY
Qty: 30 TABLET | Refills: 0 | Status: SHIPPED | OUTPATIENT
Start: 2023-08-11 | End: 2023-09-10

## 2023-08-11 NOTE — PROGRESS NOTES
Chief Complaint   Patient presents with    Derm Problem     Itchy all over; red areas on arms and back; x2 wks                   ASSESSMENT:     ICD-10-CM    1. Hives  L50.9 diphenhydrAMINE (BENADRYL) 25 MG tablet     cetirizine (ZYRTEC) 10 MG tablet      2. Iron deficiency anemia, unspecified iron deficiency anemia type  D50.9             PLAN: Hives, unclear etiology.  Allergy to cortisone and prednisone.  Placed on prednisone in November 2016 for neck and radicular pain.  Developed hives from it.  Will try Zyrtec daily.  Benadryl at bedtime.  Try over-the-counter topical Aquaphor or aloe vera.  Oatmeal baths.  Do not use hot water.  Go back to her normal laundry detergent.  Avoid eating that chocolate dessert.  Avoid gardening and weeding over the next 2 weeks.  I have discussed clinical findings with patient.  Side effects of medications discussed.  Symptomatic care is discussed.  I have discussed the possibility of  worsening symptoms and indication to RTC or go to the ER if they occur.  All questions are answered, patient indicates understanding of these issues and is in agreement with plan.   Patient care instructions are discussed/given at the end of visit.       Janice Bryson PA-C      SUBJECTIVE:    63-year-old female presents with her daughter for pruritic rash for 2 weeks.  Screening  used.  Using hot water on it.  No family members or close contacts with similar rash.  No travel.  Does gardening and weeding outside.  Also used a new laundry detergent.  No new medications.  No fever, cough, sore throat, nasal congestion.  No tongue, lip, throat swelling.  No difficulty breathing.  Also eating some new chocolate dessert with her kids.      History of iron deficiency anemia and vitamin D deficiency.    Allergies   Allergen Reactions    Cortisone Itching, Swelling and Rash    Cyclobenzaprine Rash    Gabapentin Itching    Hydrocodone Itching    Prednisone Hives       Past Medical History:    Diagnosis Date    Back pain     KELSIE (generalised anxiety disorder)     Gastritis     Iron deficiency anemia     Major depression     Recurrent cold sores     Seasonal allergic rhinitis        mirtazapine (REMERON) 15 MG tablet, Take 1 tablet (15 mg) by mouth At Bedtime  omeprazole (PRILOSEC) 40 MG DR capsule, Take 1 capsule (40 mg) by mouth daily  vitamin D3 (CHOLECALCIFEROL) 125 MCG (5000 UT) tablet, Take 1 tablet (125 mcg) by mouth daily  cetirizine (ZYRTEC) 10 MG tablet, Take 1 tablet (10 mg) by mouth daily (Patient not taking: Reported on 6/3/2022)  loratadine (CLARITIN REDITABS) 10 MG ODT, Take 1 tablet (10 mg) by mouth daily (Patient not taking: Reported on 6/3/2022)  olopatadine (PATADAY) 0.2 % ophthalmic solution, Place 1 drop into both eyes daily (Patient not taking: Reported on 6/3/2022)    gadobutrol (GADAVIST) injection 7.5 mL        Social History     Tobacco Use    Smoking status: Never    Smokeless tobacco: Never   Substance Use Topics    Alcohol use: No       ROS:  CONSTITUTIONAL: Negative for fatigue or fever.  EYES: Negative for eye problems.  ENT: Neg for ST.  RESP: Neg for SOB.  CV: Negative for chest pains.  GI: Negative for vomiting.  MUSCULOSKELETAL:  Negative for significant muscle or joint pains.  NEUROLOGIC: Negative for headaches.  SKIN: as above.  PSYCH: Normal mentation for age.    OBJECTIVE:  /52   Pulse 72   Temp 97.8  F (36.6  C) (Tympanic)   Resp 20   Wt 37.9 kg (83 lb 9.6 oz)   LMP 12/25/2013   SpO2 99%   BMI 20.16 kg/m    GENERAL APPEARANCE: Healthy, alert and no distress.  EYES:Conjunctiva/sclera clear.  EARS: No cerumen.   Ear canals w/o erythema.  TM's intact w/o erythema.    THROAT: No erythema w/o tonsillar enlargement . No exudates.  NECK: Supple, nontender, no lymphadenopathy.  RESP: Lungs clear to auscultation - no rales, rhonchi or wheezes  CV: Regular rate and rhythm, normal S1 S2, no murmur noted.  NEURO: Awake, alert    SKIN: Trunk, arms, legs with red  scratch marks.  Scattered papules and macules, a few raised.  Forearms, thighs and lower legs involved.  Hands and feet not involved.          Janice Bryson PA-C

## 2023-09-01 ENCOUNTER — TELEPHONE (OUTPATIENT)
Dept: FAMILY MEDICINE | Facility: CLINIC | Age: 63
End: 2023-09-01
Payer: COMMERCIAL

## 2023-09-01 NOTE — TELEPHONE ENCOUNTER
Patient Quality Outreach    Patient is due for the following:   Depression  -  PHQ-9 needed  Physical Preventive Adult Physical    Next Steps:   Patient was assigned appropriate questionnaire to complete    Type of outreach:    Sent [employer] message.    Next Steps:  Reach out within 90 days via Phone.    Max number of attempts reached: No. Will try again in 90 days if patient still on fail list.    Questions for provider review:    None           Shlomo Marquez MA

## 2023-10-20 ENCOUNTER — OFFICE VISIT (OUTPATIENT)
Dept: FAMILY MEDICINE | Facility: CLINIC | Age: 63
End: 2023-10-20
Payer: COMMERCIAL

## 2023-10-20 VITALS
HEIGHT: 55 IN | WEIGHT: 80 LBS | TEMPERATURE: 97.9 F | BODY MASS INDEX: 18.52 KG/M2 | HEART RATE: 60 BPM | OXYGEN SATURATION: 100 % | RESPIRATION RATE: 18 BRPM | SYSTOLIC BLOOD PRESSURE: 100 MMHG | DIASTOLIC BLOOD PRESSURE: 64 MMHG

## 2023-10-20 DIAGNOSIS — R07.0 THROAT PAIN: ICD-10-CM

## 2023-10-20 DIAGNOSIS — L29.9 ITCHING: ICD-10-CM

## 2023-10-20 DIAGNOSIS — R09.89 THROAT FULLNESS: ICD-10-CM

## 2023-10-20 DIAGNOSIS — F33.1 MODERATE EPISODE OF RECURRENT MAJOR DEPRESSIVE DISORDER (H): ICD-10-CM

## 2023-10-20 DIAGNOSIS — Z23 NEED FOR PROPHYLACTIC VACCINATION AND INOCULATION AGAINST INFLUENZA: ICD-10-CM

## 2023-10-20 DIAGNOSIS — Z13.1 SCREENING FOR DIABETES MELLITUS: ICD-10-CM

## 2023-10-20 DIAGNOSIS — J02.9 SORE THROAT: ICD-10-CM

## 2023-10-20 DIAGNOSIS — E55.9 VITAMIN D DEFICIENCY: ICD-10-CM

## 2023-10-20 DIAGNOSIS — Z13.220 SCREENING FOR HYPERLIPIDEMIA: ICD-10-CM

## 2023-10-20 DIAGNOSIS — Z00.00 ROUTINE GENERAL MEDICAL EXAMINATION AT A HEALTH CARE FACILITY: Primary | ICD-10-CM

## 2023-10-20 LAB
ANION GAP SERPL CALCULATED.3IONS-SCNC: 9 MMOL/L (ref 7–15)
BUN SERPL-MCNC: 10.8 MG/DL (ref 8–23)
CALCIUM SERPL-MCNC: 9.2 MG/DL (ref 8.8–10.2)
CHLORIDE SERPL-SCNC: 107 MMOL/L (ref 98–107)
CHOLEST SERPL-MCNC: 150 MG/DL
CREAT SERPL-MCNC: 0.49 MG/DL (ref 0.51–0.95)
DEPRECATED HCO3 PLAS-SCNC: 26 MMOL/L (ref 22–29)
DEPRECATED S PYO AG THROAT QL EIA: NEGATIVE
EGFRCR SERPLBLD CKD-EPI 2021: >90 ML/MIN/1.73M2
ERYTHROCYTE [DISTWIDTH] IN BLOOD BY AUTOMATED COUNT: 12.8 % (ref 10–15)
GLUCOSE SERPL-MCNC: 95 MG/DL (ref 70–99)
GROUP A STREP BY PCR: NOT DETECTED
HBA1C MFR BLD: 5.2 % (ref 0–5.6)
HCT VFR BLD AUTO: 37.2 % (ref 35–47)
HDLC SERPL-MCNC: 42 MG/DL
HGB BLD-MCNC: 12.3 G/DL (ref 11.7–15.7)
LDLC SERPL CALC-MCNC: 97 MG/DL
MCH RBC QN AUTO: 28.5 PG (ref 26.5–33)
MCHC RBC AUTO-ENTMCNC: 33.1 G/DL (ref 31.5–36.5)
MCV RBC AUTO: 86 FL (ref 78–100)
NONHDLC SERPL-MCNC: 108 MG/DL
PLATELET # BLD AUTO: 177 10E3/UL (ref 150–450)
POTASSIUM SERPL-SCNC: 4.2 MMOL/L (ref 3.4–5.3)
RBC # BLD AUTO: 4.32 10E6/UL (ref 3.8–5.2)
SODIUM SERPL-SCNC: 142 MMOL/L (ref 135–145)
TRIGL SERPL-MCNC: 57 MG/DL
WBC # BLD AUTO: 5.2 10E3/UL (ref 4–11)

## 2023-10-20 PROCEDURE — 80048 BASIC METABOLIC PNL TOTAL CA: CPT | Performed by: INTERNAL MEDICINE

## 2023-10-20 PROCEDURE — 99396 PREV VISIT EST AGE 40-64: CPT | Mod: 25 | Performed by: INTERNAL MEDICINE

## 2023-10-20 PROCEDURE — 90682 RIV4 VACC RECOMBINANT DNA IM: CPT | Performed by: INTERNAL MEDICINE

## 2023-10-20 PROCEDURE — 36415 COLL VENOUS BLD VENIPUNCTURE: CPT | Performed by: INTERNAL MEDICINE

## 2023-10-20 PROCEDURE — 85027 COMPLETE CBC AUTOMATED: CPT | Performed by: INTERNAL MEDICINE

## 2023-10-20 PROCEDURE — 80061 LIPID PANEL: CPT | Performed by: INTERNAL MEDICINE

## 2023-10-20 PROCEDURE — 83036 HEMOGLOBIN GLYCOSYLATED A1C: CPT | Performed by: INTERNAL MEDICINE

## 2023-10-20 PROCEDURE — 90471 IMMUNIZATION ADMIN: CPT | Performed by: INTERNAL MEDICINE

## 2023-10-20 PROCEDURE — 87651 STREP A DNA AMP PROBE: CPT | Performed by: INTERNAL MEDICINE

## 2023-10-20 PROCEDURE — 99214 OFFICE O/P EST MOD 30 MIN: CPT | Mod: 25 | Performed by: INTERNAL MEDICINE

## 2023-10-20 RX ORDER — HYDROXYZINE HYDROCHLORIDE 25 MG/1
25 TABLET, FILM COATED ORAL 2 TIMES DAILY PRN
Qty: 60 TABLET | Refills: 1 | Status: SHIPPED | OUTPATIENT
Start: 2023-10-20 | End: 2024-06-07

## 2023-10-20 RX ORDER — LANOLIN ALCOHOL/MO/W.PET/CERES
CREAM (GRAM) TOPICAL
Qty: 480 ML | Refills: 1 | Status: SHIPPED | OUTPATIENT
Start: 2023-10-20 | End: 2024-06-07

## 2023-10-20 RX ORDER — AMOXICILLIN 500 MG/1
500 CAPSULE ORAL 2 TIMES DAILY
Qty: 20 CAPSULE | Refills: 0 | Status: SHIPPED | OUTPATIENT
Start: 2023-10-20 | End: 2024-06-07

## 2023-10-20 RX ORDER — OMEPRAZOLE 40 MG/1
40 CAPSULE, DELAYED RELEASE ORAL DAILY
Qty: 90 CAPSULE | Refills: 3 | Status: SHIPPED | OUTPATIENT
Start: 2023-10-20

## 2023-10-20 ASSESSMENT — ENCOUNTER SYMPTOMS
HEADACHES: 1
SORE THROAT: 1
BREAST MASS: 0
PARESTHESIAS: 1

## 2023-10-20 ASSESSMENT — PATIENT HEALTH QUESTIONNAIRE - PHQ9
SUM OF ALL RESPONSES TO PHQ QUESTIONS 1-9: 3
SUM OF ALL RESPONSES TO PHQ QUESTIONS 1-9: 3
10. IF YOU CHECKED OFF ANY PROBLEMS, HOW DIFFICULT HAVE THESE PROBLEMS MADE IT FOR YOU TO DO YOUR WORK, TAKE CARE OF THINGS AT HOME, OR GET ALONG WITH OTHER PEOPLE: NOT DIFFICULT AT ALL

## 2023-10-20 ASSESSMENT — PAIN SCALES - GENERAL: PAINLEVEL: NO PAIN (0)

## 2023-10-20 NOTE — PROGRESS NOTES
"   SUBJECTIVE:   CC: Gricelda is an 63 year old who presents for preventive health visit.       10/20/2023     9:22 AM   Additional Questions   Roomed by Flex   Accompanied by Kely Daughter       Healthy Habits:     Getting at least 3 servings of Calcium per day:  Yes    Bi-annual eye exam:  Yes    Dental care twice a year:  NO    Sleep apnea or symptoms of sleep apnea:  None    Diet:  Regular (no restrictions)    Frequency of exercise:  None    Taking medications regularly:  Yes    Medication side effects:  Lightheadedness    Additional concerns today:  No  Pharyngitis   Associated symptoms include headaches.       Today's PHQ-9 Score:       10/20/2023     9:10 AM   PHQ-9 SCORE   PHQ-9 Total Score MyChart 3 (Minimal depression)   PHQ-9 Total Score 3           Annual Wellness Visit    Patient has been advised of split billing requirements and indicates understanding: Yes     Are you in the first 12 months of your Medicare Part B coverage?  No    Physical Health:  In general, how would you rate your overall physical health? good  Outside of work, how many days during the week do you exercise?none  Outside of work, approximately how many minutes a day do you exercise?not applicable  If you drink alcohol do you typically have >3 drinks per day or >7 drinks per week? No  Do you usually eat at least 4 servings of fruit and vegetables a day, include whole grains & fiber and avoid regularly eating high fat or \"junk\" foods? Yes  Do you have any problems taking medications regularly? No  Do you have any side effects from medications? none  Needs assistance for the following daily activities: no assistance needed  Which of the following safety concerns are present in your home?  none identified   Hearing impairment: No  In the past 6 months, have you been bothered by leaking of urine? no    Mental Health:  In general, how would you rate your overall mental or emotional health? fair    Today's PHQ-9 Score:       10/20/2023     " 9:10 AM   PHQ-9 SCORE   PHQ-9 Total Score MyChart 3 (Minimal depression)   PHQ-9 Total Score 3         Do you feel safe in your environment? Yes    Have you ever done Advance Care Planning? (For example, a Health Directive, POLST, or a discussion with a medical provider or your loved ones about your wishes)? No, advance care planning information given to patient to review.  Patient declined advance care planning discussion at this time.    Fall risk:     Do you have sleep apnea, excessive snoring or daytime drowsiness? : no    Social History     Tobacco Use    Smoking status: Never    Smokeless tobacco: Never   Substance Use Topics    Alcohol use: No             10/20/2023     9:14 AM   Alcohol Use   Prescreen: >3 drinks/day or >7 drinks/week? Not Applicable          No data to display              Do you have a current opioid prescription? No  Do you use any other controlled substances or medications that are not prescribed by a provider? None    Current providers sharing in care for this patient include:   Patient Care Team:  Marycarmen Taylor MD as PCP - General (Family Medicine)  Pooja Francisco MD as Assigned PCP  Michael Jeong MD as Assigned Surgical Provider    The following health maintenance items are reviewed in Epic and correct as of today:  Health Maintenance   Topic Date Due    DTAP/TDAP/TD IMMUNIZATION (1 - Tdap) Never done    ZOSTER IMMUNIZATION (1 of 2) Never done    YEARLY PREVENTIVE VISIT  02/24/2017    LIPID  11/02/2020    RSV VACCINE 60+ (1 - 1-dose 60+ series) Never done    COVID-19 Vaccine (4 - 2023-24 season) 09/01/2023    PHQ-9  04/20/2024    ANNUAL REVIEW OF HM ORDERS  10/20/2024    CBC  10/20/2024    MAMMO SCREENING  01/13/2025    HPV TEST  03/23/2025    PAP  03/23/2025    COLORECTAL CANCER SCREENING  11/20/2027    ADVANCE CARE PLANNING  10/20/2028    HEPATITIS C SCREENING  Completed    DEPRESSION ACTION PLAN  Completed    INFLUENZA VACCINE  Completed    Pneumococcal  Vaccine: Pediatrics (0 to 5 Years) and At-Risk Patients (6 to 64 Years)  Aged Out    IPV IMMUNIZATION  Aged Out    HPV IMMUNIZATION  Aged Out    MENINGITIS IMMUNIZATION  Aged Out    HIV SCREENING  Discontinued       Patient has been advised of split billing requirements and indicates understanding: No    Appropriate preventive services were discussed with this patient, including applicable screening as appropriate for fall prevention, nutrition, physical activity, Tobacco-use cessation, weight loss and cognition.  Checklist reviewing preventive services available has been given to the patient.      RESPIRATORY SYMPTOMS    Duration: 10 DAYS  Description  sore throat  Severity: moderate  Accompanying signs and symptoms: Headaache  History (predisposing factors):  none  Precipitating or alleviating factors: None  Therapies tried and outcome:  none  Have you ever done Advance Care Planning? (For example, a Health Directive, POLST, or a discussion with a medical provider or your loved ones about your wishes): No, advance care planning information given to patient to review.  Advanced care planning was discussed at today's visit.    Social History     Tobacco Use    Smoking status: Never    Smokeless tobacco: Never   Substance Use Topics    Alcohol use: No             10/20/2023     9:14 AM   Alcohol Use   Prescreen: >3 drinks/day or >7 drinks/week? Not Applicable          No data to display              Reviewed orders with patient.  Reviewed health maintenance and updated orders accordingly - No  Lab work is in process    Breast Cancer Screening:    [facility]HS-7:       1/13/2023     1:14 PM   Breast CA Risk Assessment ([facility]HS-7)   Did any of your first-degree relatives have breast or ovarian cancer? No   Did any of your relatives have bilateral breast cancer? No   Did any man in your family have breast cancer? No   Did any woman in your family have breast and ovarian cancer? No   Did any woman in your family have breast cancer  "before age 50 y? No   Do you have 2 or more relatives with breast and/or ovarian cancer? No   Do you have 2 or more relatives with breast and/or bowel cancer? No     click delete button to remove this line now  Mammogram Screening: Recommended mammography every 1-2 years with patient discussion and risk factor consideration  Pertinent mammograms are reviewed under the imaging tab.    History of abnormal Pap smear: NO - age 30-65 PAP every 5 years with negative HPV co-testing recommended      Latest Ref Rng & Units 2/24/2016    12:00 PM 2/24/2016    12:00 AM   PAP / HPV   PAP (Historical)   NIL    HPV 16 DNA NEG Negative     HPV 18 DNA NEG Negative     Other HR HPV NEG Negative       Reviewed and updated as needed this visit by clinical staff   Tobacco  Allergies  Meds              Reviewed and updated as needed this visit by Provider                     Review of Systems   HENT:  Positive for sore throat.    Breasts:  Negative for tenderness, breast mass and discharge.   Genitourinary:  Negative for pelvic pain, vaginal bleeding and vaginal discharge.   Neurological:  Positive for headaches and paresthesias.          OBJECTIVE:   /64 (BP Location: Left arm, Patient Position: Sitting, Cuff Size: Adult Small)   Pulse 60   Temp 97.9  F (36.6  C) (Oral)   Resp 18   Ht 1.376 m (4' 6.17\")   Wt 36.3 kg (80 lb)   LMP 12/25/2013   SpO2 100%   BMI 19.17 kg/m    Physical Exam  GENERAL: healthy, alert and no distress  EYES: Eyes grossly normal to inspection, PERRL and conjunctivae and sclerae normal  HENT: Erythematous throat  No swollen tonsils  NECK: no adenopathy, no asymmetry, masses, or scars and thyroid normal to palpation  RESP: lungs clear to auscultation - no rales, rhonchi or wheezes  CV: regular rate and rhythm, normal S1 S2, no S3 or S4, no murmur, click or rub, no peripheral edema and peripheral pulses strong  ABDOMEN: soft, nontender, no hepatosplenomegaly, no masses and bowel sounds normal  MS: " no gross musculoskeletal defects noted, no edema  SKIN: no suspicious lesions or rashes  NEURO: Normal strength and tone, mentation intact and speech normal  PSYCH: mentation appears normal, affect normal/bright    Diagnostic Test Results:  Labs reviewed in Epic    ASSESSMENT/PLAN:   (Z00.00) Routine general medical examination at a health care facility  (primary encounter diagnosis)  Comment: Up-to-date with colonoscopy  Up-to-date with mammogram  Up-to-date with Pap smears  Declined shingles/Tdap/COVID vaccines  Agreed to get flu vaccine  Plan: CBC with Platelets, Basic metabolic panel  (Ca,        Cl, CO2, Creat, Gluc, K, Na, BUN)            (R07.0) Throat pain  Comment: She has some reflux symptoms and take Prilosec's on a regular basis  Plan: omeprazole (PRILOSEC) 40 MG DR capsule            (R09.89) Throat fullness  Comment:   Plan: omeprazole (PRILOSEC) 40 MG DR capsule        Throat fullness was supposed to be from the reflux symptoms and she takes Prilosec for that    (F33.1) Moderate episode of recurrent major depressive disorder (H)  Comment: Her PHQ-9 score is good today  She is supposed to be on Remeron but it is not clear if she is taking it  Plan:     (J02.9) Sore throat  Comment: Complaining of sore throat  Ongoing for the last 10 days  She did not do any home COVID test  No fever or chills or body aches  Check for strep but even if it is negative start amoxicillin since the symptoms have been going for almost 10 days  Advised about salt water granulation  Advised that if her symptoms are not resolving 1 week and reevaluate her and get further investigations if indicated  Plan: Streptococcus A Rapid Screen w/Reflex to PCR -         Clinic Collect, amoxicillin (AMOXIL) 500 MG         capsule, Group A Streptococcus PCR Throat Swab            (E55.9) Vitamin D deficiency  Comment: She was on vitamin D replacement therapy in the past but currently not taking it  Plan: vitamin D3 (CHOLECALCIFEROL) 250 mcg  (72644         units) capsule            (Z13.220) Screening for hyperlipidemia  Comment:   Plan: Lipid panel reflex to direct LDL Non-fasting            (Z13.1) Screening for diabetes mellitus  Comment:   Plan: Hemoglobin A1c            (Z23) Need for prophylactic vaccination and inoculation against influenza  Comment: Flu vaccine was administered  Plan:     (L29.9) Itching  Comment: Complaining of itching  This is probably from dry skin  She was put on Zyrtec in the past but apparently it made her drowsy as she is taking every day  She will need to take hydroxyzine as needed  Explained this to her  Try Eucerin lotion  Plan: hydrOXYzine (ATARAX) 25 MG tablet, Eucerin         external lotion            Patient has been advised of split billing requirements and indicates understanding: No      COUNSELING:  Reviewed preventive health counseling, as reflected in patient instructions       Regular exercise       Healthy diet/nutrition       Vision screening       Hearing screening       Immunizations  Vaccinated for: Influenza           Colorectal Cancer Screening        She reports that she has never smoked. She has never used smokeless tobacco.          Home Kolb MD  [facility]  Answers submitted by the patient for this visit:  Patient Health Questionnaire (Submitted on 10/20/2023)  If you checked off any problems, how difficult have these problems made it for you to do your work, take care of things at home, or get along with other people?: Not difficult at all  PHQ9 TOTAL SCORE: 3

## 2024-01-27 ENCOUNTER — ANCILLARY PROCEDURE (OUTPATIENT)
Dept: GENERAL RADIOLOGY | Facility: CLINIC | Age: 64
End: 2024-01-27
Attending: PHYSICIAN ASSISTANT
Payer: COMMERCIAL

## 2024-01-27 ENCOUNTER — OFFICE VISIT (OUTPATIENT)
Dept: URGENT CARE | Facility: URGENT CARE | Age: 64
End: 2024-01-27
Payer: COMMERCIAL

## 2024-01-27 VITALS
OXYGEN SATURATION: 99 % | BODY MASS INDEX: 19.21 KG/M2 | WEIGHT: 80.19 LBS | HEART RATE: 61 BPM | TEMPERATURE: 98.6 F | DIASTOLIC BLOOD PRESSURE: 63 MMHG | SYSTOLIC BLOOD PRESSURE: 111 MMHG | RESPIRATION RATE: 20 BRPM

## 2024-01-27 DIAGNOSIS — S39.92XA LOWER BACK INJURY, INITIAL ENCOUNTER: ICD-10-CM

## 2024-01-27 DIAGNOSIS — S29.9XXA INJURY OF UPPER BACK, INITIAL ENCOUNTER: Primary | ICD-10-CM
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
## 2024-01-27 PROCEDURE — 72070 X-RAY EXAM THORAC SPINE 2VWS: CPT | Mod: TC | Performed by: RADIOLOGY

## 2024-01-27 PROCEDURE — 99213 OFFICE O/P EST LOW 20 MIN: CPT | Performed by: PHYSICIAN ASSISTANT

## 2024-01-27 PROCEDURE — 73522 X-RAY EXAM HIPS BI 3-4 VIEWS: CPT | Mod: TC | Performed by: RADIOLOGY

## 2024-01-27 RX ORDER — METHOCARBAMOL 500 MG/1
500 TABLET, FILM COATED ORAL 4 TIMES DAILY PRN
Qty: 30 TABLET | Refills: 0 | Status: SHIPPED | OUTPATIENT
Start: 2024-01-27 | End: 2024-06-07

## 2024-01-27 RX ORDER — IBUPROFEN 600 MG/1
600 TABLET, FILM COATED ORAL EVERY 6 HOURS PRN
Qty: 30 TABLET | Refills: 0 | Status: SHIPPED | OUTPATIENT
Start: 2024-01-27 | End: 2024-06-07

## 2024-01-27 RX ORDER — LIDOCAINE 50 MG/G
3 PATCH TOPICAL EVERY 24 HOURS
Qty: 30 PATCH | Refills: 0 | Status: SHIPPED | OUTPATIENT
Start: 2024-01-27 | End: 2024-06-07

## 2024-01-27 ASSESSMENT — ENCOUNTER SYMPTOMS
HEMATURIA: 0
COLOR CHANGE: 0
BACK PAIN: 1
NECK STIFFNESS: 0
CHILLS: 0
FATIGUE: 0
FREQUENCY: 0
NECK PAIN: 0
JOINT SWELLING: 0
ARTHRALGIAS: 1
WOUND: 0
DYSURIA: 0
PALPITATIONS: 0
FEVER: 0
MYALGIAS: 1
CARDIOVASCULAR NEGATIVE: 1
FLANK PAIN: 0

## 2024-01-27 NOTE — PROGRESS NOTES
Jessica Madison is a 63 year old, presenting for the following health issues with daughter:  Urgent Care (Urgent care visit for back pain.) and Fall (The patient fell on January 13th. She slipped on water that was on the floor in her house. The patient fell backward and landed on her bottom then the force of it make her fall flat on her back. Now her upper back is in constant pain, and her bottom hurts as well as the sacral area. This makes it hard to walk. She tried a cooling pad but it made it worse. She tried Tylenol but it does not help.)    HPI   Back Pain  Onset/Duration: 14days ago  Description:   Location of pain: upper back, buttock region  Character of pain: dull  Pain radiation: none  New numbness or weakness in legs, not attributed to pain: No weakness, numbness, tingling.  No bladder or bowel dysfunction.  No swelling, redness, drainage or fevers.    Intensity: moderate  Progression of Symptoms: same  History:   Specific cause: Sustained an injury to her upper back and buttock region after slipping and falling on wet floors at home 2weeks ago.  No head or neck injuries or LOC.  Pain interferes with job: NA  History of back problems: no prior back problems  Any previous MRI or X-rays: None  Sees a specialist for back pain: No  Alleviating factors:   Improved by: rest, sitting    Precipitating factors:  Worsened by: Lifting, Bending, Standing, changing positions  Therapies tried and outcome: acetaminophen (Tylenol), heat, rest and sitting with minimal relief  Accompanying Signs & Symptoms:  Risk of Fracture: Yes  Risk of Cauda Equina: None  Risk of Infection: None  Risk of Cancer: None  Risk of Ankylosing Spondylitis: Onset at age <35, male, AND morning back stiffness  no     Patient Active Problem List   Diagnosis    Recurrent cold sores    Gastritis    Major depression    Generalized anxiety disorder    Seasonal allergic rhinitis    Iron deficiency anemia    CARDIOVASCULAR SCREENING; LDL GOAL LESS  THAN 160    Vitamin D deficiency    External hemorrhoids    Internal hemorrhoids    Pelvic floor dysfunction    RLQ abdominal pain    Nephrolithiasis    Left ovarian cyst    Liver masses    Multinodular thyroid    H. pylori infection    Moderate episode of recurrent major depressive disorder (H)    Abnormal pelvic ultrasound    Cysts of both ovaries    Abnormal MRI, pelvis     Current Outpatient Medications   Medication    Eucerin external lotion    hydrOXYzine (ATARAX) 25 MG tablet    mirtazapine (REMERON) 15 MG tablet    olopatadine (PATADAY) 0.2 % ophthalmic solution    amoxicillin (AMOXIL) 500 MG capsule    omeprazole (PRILOSEC) 40 MG DR capsule    vitamin D3 (CHOLECALCIFEROL) 125 MCG (5000 UT) tablet    vitamin D3 (CHOLECALCIFEROL) 250 mcg (56737 units) capsule     No current facility-administered medications for this visit.     Facility-Administered Medications Ordered in Other Visits   Medication    gadobutrol (GADAVIST) injection 7.5 mL        Allergies   Allergen Reactions    Cortisone Itching, Swelling and Rash    Cyclobenzaprine Rash    Gabapentin Itching    Hydrocodone Itching    Prednisone Hives     Review of Systems   Constitutional:  Negative for chills, fatigue and fever.   Cardiovascular: Negative.  Negative for chest pain, palpitations and leg swelling.   Genitourinary:  Negative for dysuria, flank pain, frequency, hematuria and urgency.   Musculoskeletal:  Positive for arthralgias, back pain and myalgias. Negative for gait problem, joint swelling, neck pain and neck stiffness.   Skin: Negative.  Negative for color change, pallor, rash and wound.   All other systems reviewed and are negative.          Objective    /63 (BP Location: Left arm, Patient Position: Sitting, Cuff Size: Adult Small)   Pulse 61   Temp 98.6  F (37  C) (Oral)   Resp 20   Wt 36.4 kg (80 lb 3 oz)   LMP 12/25/2013   SpO2 99%   Breastfeeding No   BMI 19.21 kg/m    Body mass index is 19.21 kg/m .  Physical  Exam  Vitals and nursing note reviewed.   Constitutional:       General: She is not in acute distress.     Appearance: Normal appearance. She is normal weight. She is not ill-appearing.   Musculoskeletal:      Thoracic back: Tenderness present. No swelling, edema, deformity, signs of trauma, lacerations, spasms or bony tenderness. Decreased range of motion.      Lumbar back: Tenderness (pelvis regions) present. No swelling, deformity, spasms or bony tenderness. Normal range of motion. Negative right straight leg raise test and negative left straight leg raise test.      Right hip: Normal. No tenderness or bony tenderness. Normal range of motion.      Left hip: Normal. No tenderness or bony tenderness. Normal range of motion.   Skin:     General: Skin is warm.      Capillary Refill: Capillary refill takes less than 2 seconds.   Neurological:      General: No focal deficit present.      Mental Status: She is alert and oriented to person, place, and time.      Sensory: Sensation is intact.      Motor: Motor function is intact.      Gait: Gait is intact.      Deep Tendon Reflexes: Reflexes are normal and symmetric.   Psychiatric:         Mood and Affect: Mood normal.         Behavior: Behavior normal.         Thought Content: Thought content normal.         Judgment: Judgment normal.       Results for orders placed or performed in visit on 01/27/24 (from the past 24 hour(s))   XR Pelvis and Hip Bilateral 2 Views    Narrative    EXAM: XR PELVIS AND HIP BILATERAL 2 VIEWS  LOCATION: [facility]  DATE: 1/27/2024    INDICATION:  Lower back injury, initial encounter  COMPARISON: None.      Impression    IMPRESSION: Bones are demineralized. No significant joint space narrowing involving either hip. No acute displaced fractures identified. Sclerotic focus in the right iliac bone is most keeping with a bone island. No hip dislocation on either side.   XR Thoracic Spine 2 Views    Narrative     EXAM: XR THORACIC SPINE 2 VIEWS  LOCATION: [facility]  DATE: 1/27/2024    INDICATION:  Injury of upper back, initial encounter  COMPARISON: None.      Impression    IMPRESSION:   Age-indeterminate superior endplate compression deformities involving the mid thoracic spine, likely at the T7 and T8 levels. MR could be used to evaluate for acuity. The vertebral bodies of the thoracic spine otherwise have normal stature and alignment   without other definite evidence of compression fracture. The disc spaces are well-maintained. Soft tissues unremarkable.                 Assessment/Plan:  Injury of upper back, initial encounter:  Xrays show possible compression deformities vs strain/sprain/contusion.  Recommend RICE and will give methocarbamol, lidocaine patches and ibuprofen prn pain.   Discussed risks and benefits of medication along with side effects, direction for use.  No driving or operating machinery due to sedation. Will also send to orthopedics for further evaluation and management.  Recheck in clinic if symptoms worsen or if symptoms do not improve.  -     XR Thoracic Spine 2 Views  -     Spine  Referral; Future  -     lidocaine (LIDODERM) 5 % patch; Place 3 patches onto the skin every 24 hours To prevent lidocaine toxicity, patient should be patch free for 12 hrs daily.  -     methocarbamol (ROBAXIN) 500 MG tablet; Take 1 tablet (500 mg) by mouth 4 times daily as needed for muscle spasms  -     ibuprofen (ADVIL/MOTRIN) 600 MG tablet; Take 1 tablet (600 mg) by mouth every 6 hours as needed for moderate pain    Lower back injury, initial encounter:  Xrays of pelvis and hip were negative for acute findings.  Same treatment as above.  -     XR Pelvis and Hip Bilateral 2 Views  -     Spine  Referral; Future  -     lidocaine (LIDODERM) 5 % patch; Place 3 patches onto the skin every 24 hours To prevent lidocaine toxicity, patient should be patch free for 12 hrs  daily.  -     methocarbamol (ROBAXIN) 500 MG tablet; Take 1 tablet (500 mg) by mouth 4 times daily as needed for muscle spasms  -     ibuprofen (ADVIL/MOTRIN) 600 MG tablet; Take 1 tablet (600 mg) by mouth every 6 hours as needed for moderate pain    Fall, initial encounter        Lilo See MARSHA Butler

## 2024-06-07 ENCOUNTER — OFFICE VISIT (OUTPATIENT)
Dept: URGENT CARE | Facility: URGENT CARE | Age: 64
End: 2024-06-07
Payer: COMMERCIAL

## 2024-06-07 ENCOUNTER — ANCILLARY PROCEDURE (OUTPATIENT)
Dept: GENERAL RADIOLOGY | Facility: CLINIC | Age: 64
End: 2024-06-07
Attending: PHYSICIAN ASSISTANT
Payer: COMMERCIAL

## 2024-06-07 VITALS
RESPIRATION RATE: 16 BRPM | DIASTOLIC BLOOD PRESSURE: 67 MMHG | OXYGEN SATURATION: 98 % | TEMPERATURE: 97.1 F | HEART RATE: 61 BPM | SYSTOLIC BLOOD PRESSURE: 130 MMHG | BODY MASS INDEX: 19.21 KG/M2 | WEIGHT: 80.2 LBS

## 2024-06-07 DIAGNOSIS — R07.81 RIB PAIN ON LEFT SIDE: ICD-10-CM

## 2024-06-07 DIAGNOSIS — R07.81 RIB PAIN ON LEFT SIDE: Primary | ICD-10-CM

## 2024-06-07 PROCEDURE — 99213 OFFICE O/P EST LOW 20 MIN: CPT | Performed by: PHYSICIAN ASSISTANT

## 2024-06-07 PROCEDURE — 71101 X-RAY EXAM UNILAT RIBS/CHEST: CPT | Mod: TC | Performed by: RADIOLOGY

## 2024-06-07 RX ORDER — MELOXICAM 7.5 MG/1
7.5 TABLET ORAL DAILY
Qty: 10 TABLET | Refills: 0 | Status: SHIPPED | OUTPATIENT
Start: 2024-06-07

## 2024-06-07 ASSESSMENT — PAIN SCALES - GENERAL: PAINLEVEL: EXTREME PAIN (9)

## 2024-06-07 NOTE — PROGRESS NOTES
Assessment & Plan     1. Rib pain on left side    - XR Ribs & Chest Left G/E 3 Views; Future  - meloxicam (MOBIC) 7.5 MG tablet; Take 1 tablet (7.5 mg) by mouth daily  Dispense: 10 tablet; Refill: 0    Patient presents to the clinic for evaluation of left rib pain after a fall. She is tender over her anterior rib  XR is without evidence of fracture or pneumothorax  I suspect that her symptoms are secondary to a chest wall contusion.  Treatment with Mobic at a reduced dose 1 time daily.  Discussed okay to continue with Tylenol and using ice on the area.  If symptoms are worsening, or not improving as expected advise follow-up with her primary care doctor or in the emergency department as she may need further evaluation.      Return in about 5 days (around 6/12/2024), or if symptoms worsen or fail to improve.    Diagnosis and treatment plan was reviewed with patient and/or family.   We went over any labs or imaging. Discussed worsening symptoms or little to no relief despite treatment plan to follow-up with PCP or return to clinic.  Patient verbalizes understanding. All questions were addressed and answered.     María Conti PA-C  [facility]    CHIEF COMPLAINT:   Chief Complaint   Patient presents with    Fall     Pt fell in the tub two weeks ago.     Rib Pain     Pt complains of left sided rib pain. No bruising.      Subjective     Gricelda is a 63 year old female who presents to clinic today for evaluation of left sided rib pain. On May 28th, she sustained a mechanical fall in the bathroom landing on left rib on the ledge of the bath tub. She has had pain since that time. Pain is made worse with taking a deep breath in and pressing on the area. She has been taking Tylenol for her symptoms.    Patient denies having fever, chills, numbness, tingling, pale or cold extremity.       Past Medical History:   Diagnosis Date    Back pain     KELSIE (generalised anxiety disorder)     Gastritis      Iron deficiency anemia     Major depression     Recurrent cold sores     Seasonal allergic rhinitis      Past Surgical History:   Procedure Laterality Date    C/SECTION, LOW TRANSVERSE      , Low Transverse    COLONOSCOPY WITH CO2 INSUFFLATION N/A 2017    Procedure: COLONOSCOPY WITH CO2 INSUFFLATION;  colonoscopy/Dr. Destiney Cuba/Rectal mass [K62.9]  - Primary /BM[facility]: [facility]: [phone];  Surgeon: John Aguilar MD;  Location: [facility] OR    TUBAL LIGATION       Social History     Tobacco Use    Smoking status: Never     Passive exposure: Never    Smokeless tobacco: Never   Substance Use Topics    Alcohol use: No     Current Outpatient Medications   Medication Sig Dispense Refill    meloxicam (MOBIC) 7.5 MG tablet Take 1 tablet (7.5 mg) by mouth daily 10 tablet 0    amoxicillin (AMOXIL) 500 MG capsule Take 1 capsule (500 mg) by mouth 2 times daily (Patient not taking: Reported on 2024) 20 capsule 0    Eucerin external lotion Apply twice a day for itcing (Patient not taking: Reported on 2024) 480 mL 1    hydrOXYzine (ATARAX) 25 MG tablet Take 1 tablet (25 mg) by mouth 2 times daily as needed for itching (Patient not taking: Reported on 2024) 60 tablet 1    ibuprofen (ADVIL/MOTRIN) 600 MG tablet Take 1 tablet (600 mg) by mouth every 6 hours as needed for moderate pain (Patient not taking: Reported on 2024) 30 tablet 0    lidocaine (LIDODERM) 5 % patch Place 3 patches onto the skin every 24 hours To prevent lidocaine toxicity, patient should be patch free for 12 hrs daily. (Patient not taking: Reported on 2024) 30 patch 0    methocarbamol (ROBAXIN) 500 MG tablet Take 1 tablet (500 mg) by mouth 4 times daily as needed for muscle spasms (Patient not taking: Reported on 2024) 30 tablet 0    mirtazapine (REMERON) 15 MG tablet Take 1 tablet (15 mg) by mouth At Bedtime (Patient not taking: Reported on 2024) 90 tablet 1    olopatadine (PATADAY) 0.2 % ophthalmic solution  Place 1 drop into both eyes daily (Patient not taking: Reported on 6/7/2024) 2.5 mL 11    omeprazole (PRILOSEC) 40 MG DR capsule Take 1 capsule (40 mg) by mouth daily (Patient not taking: Reported on 1/27/2024) 90 capsule 3    vitamin D3 (CHOLECALCIFEROL) 125 MCG (5000 UT) tablet Take 1 tablet (125 mcg) by mouth daily (Patient not taking: Reported on 1/27/2024) 30 tablet 3    vitamin D3 (CHOLECALCIFEROL) 250 mcg (97214 units) capsule Take 1 capsule (250 mcg) by mouth daily (Patient not taking: Reported on 1/27/2024) 90 capsule 3     No current facility-administered medications for this visit.     Facility-Administered Medications Ordered in Other Visits   Medication Dose Route Frequency Provider Last Rate Last Admin    gadobutrol (GADAVIST) injection 7.5 mL  7.5 mL Intravenous Once Rosalinda Bradshaw PA-C         Allergies   Allergen Reactions    Cortisone Itching, Swelling and Rash    Cyclobenzaprine Rash    Gabapentin Itching    Hydrocodone Itching    Prednisone Hives       10 point ROS of systems were all negative except for pertinent positives noted in my HPI.      Exam:   /67 (BP Location: Left arm, Patient Position: Sitting, Cuff Size: Adult Small)   Pulse 61   Temp 97.1  F (36.2  C) (Tympanic)   Resp 16   Wt 36.4 kg (80 lb 3.2 oz)   LMP 12/25/2013   SpO2 98%   BMI 19.21 kg/m    Physical Exam  Constitutional:       Appearance: Normal appearance.   HENT:      Head: Normocephalic and atraumatic.      Mouth/Throat:      Lips: Pink.      Mouth: Mucous membranes are moist.   Cardiovascular:      Rate and Rhythm: Normal rate and regular rhythm.   Pulmonary:      Effort: Pulmonary effort is normal.   Chest:          Comments: Tenderness over anterior ribs without bruising, swelling or crepitus.   Musculoskeletal:      Cervical back: Full passive range of motion without pain.   Neurological:      Mental Status: She is alert.         Results for orders placed or performed in visit on 06/07/24   XR  Ribs & Chest Left G/E 3 Views     Status: None    Narrative    LEFT RIBS AND CHEST, THREE VIEWS   6/7/2024 10:41 AM     HISTORY:  Left-sided rib pain after a fall on 5/28. Rib pain on left  side.    COMPARISON: None.      Impression    IMPRESSION:  1. The cardiomediastinal silhouette and pulmonary vasculature appear  normal. No infiltrates or effusions.  2. The bones are diffusely demineralized.  3. There is no evidence of fracture or pneumothorax.  4. Otherwise negative.    IGOR HOLLOWAY MD         SYSTEM ID:  [id number]

## 2024-06-07 NOTE — PATIENT INSTRUCTIONS
I do not see any abnormality on your chest XR if the radiologist comes back with a different reading, you will be contacted  Take the mobic daily as needed for pain  Continue with tylenol  Worsening symptoms to ER

## 2024-10-25 ENCOUNTER — OFFICE VISIT (OUTPATIENT)
Dept: FAMILY MEDICINE | Facility: CLINIC | Age: 64
End: 2024-10-25
Payer: COMMERCIAL

## 2024-10-25 VITALS
DIASTOLIC BLOOD PRESSURE: 59 MMHG | HEART RATE: 72 BPM | RESPIRATION RATE: 16 BRPM | TEMPERATURE: 97.7 F | OXYGEN SATURATION: 99 % | SYSTOLIC BLOOD PRESSURE: 99 MMHG | BODY MASS INDEX: 18.52 KG/M2 | HEIGHT: 55 IN | WEIGHT: 80 LBS

## 2024-10-25 DIAGNOSIS — F33.1 MODERATE EPISODE OF RECURRENT MAJOR DEPRESSIVE DISORDER (H): ICD-10-CM

## 2024-10-25 DIAGNOSIS — Z13.220 SCREENING FOR HYPERLIPIDEMIA: ICD-10-CM

## 2024-10-25 DIAGNOSIS — E55.9 VITAMIN D DEFICIENCY: ICD-10-CM

## 2024-10-25 DIAGNOSIS — Z23 ENCOUNTER FOR IMMUNIZATION: ICD-10-CM

## 2024-10-25 DIAGNOSIS — E04.2 MULTINODULAR THYROID: ICD-10-CM

## 2024-10-25 DIAGNOSIS — Z00.00 ROUTINE GENERAL MEDICAL EXAMINATION AT A HEALTH CARE FACILITY: Primary | ICD-10-CM

## 2024-10-25 DIAGNOSIS — D64.9 ANEMIA, UNSPECIFIED TYPE: ICD-10-CM

## 2024-10-25 DIAGNOSIS — Z71.84 TRAVEL ADVICE ENCOUNTER: ICD-10-CM

## 2024-10-25 LAB
ERYTHROCYTE [DISTWIDTH] IN BLOOD BY AUTOMATED COUNT: 12.9 % (ref 10–15)
HCT VFR BLD AUTO: 36.3 % (ref 35–47)
HGB BLD-MCNC: 11.9 G/DL (ref 11.7–15.7)
MCH RBC QN AUTO: 28.4 PG (ref 26.5–33)
MCHC RBC AUTO-ENTMCNC: 32.8 G/DL (ref 31.5–36.5)
MCV RBC AUTO: 87 FL (ref 78–100)
PLATELET # BLD AUTO: 175 10E3/UL (ref 150–450)
RBC # BLD AUTO: 4.19 10E6/UL (ref 3.8–5.2)
WBC # BLD AUTO: 4.6 10E3/UL (ref 4–11)

## 2024-10-25 PROCEDURE — 80048 BASIC METABOLIC PNL TOTAL CA: CPT | Performed by: PREVENTIVE MEDICINE

## 2024-10-25 PROCEDURE — 99396 PREV VISIT EST AGE 40-64: CPT | Mod: 25 | Performed by: PREVENTIVE MEDICINE

## 2024-10-25 PROCEDURE — 80061 LIPID PANEL: CPT | Performed by: PREVENTIVE MEDICINE

## 2024-10-25 PROCEDURE — 36415 COLL VENOUS BLD VENIPUNCTURE: CPT | Performed by: PREVENTIVE MEDICINE

## 2024-10-25 PROCEDURE — 82607 VITAMIN B-12: CPT | Performed by: PREVENTIVE MEDICINE

## 2024-10-25 PROCEDURE — 91320 SARSCV2 VAC 30MCG TRS-SUC IM: CPT | Performed by: PREVENTIVE MEDICINE

## 2024-10-25 PROCEDURE — 85027 COMPLETE CBC AUTOMATED: CPT | Performed by: PREVENTIVE MEDICINE

## 2024-10-25 PROCEDURE — 90471 IMMUNIZATION ADMIN: CPT | Performed by: PREVENTIVE MEDICINE

## 2024-10-25 PROCEDURE — 99213 OFFICE O/P EST LOW 20 MIN: CPT | Mod: 25 | Performed by: PREVENTIVE MEDICINE

## 2024-10-25 PROCEDURE — 90480 ADMN SARSCOV2 VAC 1/ONLY CMP: CPT | Performed by: PREVENTIVE MEDICINE

## 2024-10-25 PROCEDURE — 90673 RIV3 VACCINE NO PRESERV IM: CPT | Performed by: PREVENTIVE MEDICINE

## 2024-10-25 PROCEDURE — 82306 VITAMIN D 25 HYDROXY: CPT | Performed by: PREVENTIVE MEDICINE

## 2024-10-25 PROCEDURE — 96127 BRIEF EMOTIONAL/BEHAV ASSMT: CPT | Performed by: PREVENTIVE MEDICINE

## 2024-10-25 PROCEDURE — 84443 ASSAY THYROID STIM HORMONE: CPT | Performed by: PREVENTIVE MEDICINE

## 2024-10-25 PROCEDURE — 82728 ASSAY OF FERRITIN: CPT | Performed by: PREVENTIVE MEDICINE

## 2024-10-25 RX ORDER — DIMENHYDRINATE 50 MG
25-50 TABLET ORAL 2 TIMES DAILY PRN
Qty: 30 TABLET | Refills: 0 | Status: SHIPPED | OUTPATIENT
Start: 2024-10-25

## 2024-10-25 RX ORDER — ACETAMINOPHEN 500 MG
500-1000 TABLET ORAL EVERY 8 HOURS PRN
Qty: 90 TABLET | Refills: 0 | Status: SHIPPED | OUTPATIENT
Start: 2024-10-25

## 2024-10-25 SDOH — HEALTH STABILITY: PHYSICAL HEALTH: ON AVERAGE, HOW MANY DAYS PER WEEK DO YOU ENGAGE IN MODERATE TO STRENUOUS EXERCISE (LIKE A BRISK WALK)?: 1 DAY

## 2024-10-25 SDOH — HEALTH STABILITY: PHYSICAL HEALTH: ON AVERAGE, HOW MANY MINUTES DO YOU ENGAGE IN EXERCISE AT THIS LEVEL?: 10 MIN

## 2024-10-25 ASSESSMENT — SOCIAL DETERMINANTS OF HEALTH (SDOH): HOW OFTEN DO YOU GET TOGETHER WITH FRIENDS OR RELATIVES?: ONCE A WEEK

## 2024-10-25 NOTE — PROGRESS NOTES
Preventive Care Visit  [facility] ROSLYNJEN Francisco MD, Family Medicine  Oct 25, 2024      Assessment & Plan     Routine general medical examination at a health care facility  -preventive guidelines reviewed   - REVIEW OF HEALTH MAINTENANCE PROTOCOL ORDERS  - Lipid panel reflex to direct LDL Non-fasting    Multinodular thyroid  -stable  - Basic metabolic panel  (Ca, Cl, CO2, Creat, Gluc, K, Na, BUN)  - TSH with free T4 reflex    Moderate episode of recurrent major depressive disorder (H)  -mood symptoms improved, not on medication   - Basic metabolic panel  (Ca, Cl, CO2, Creat, Gluc, K, Na, BUN)    Anemia, unspecified type  -patient is concerned that she may be anemic  -no bleeding, no melena, no rectal bleeding   - CBC with Platelets  - Ferritin  - Vitamin B12    Vitamin D deficiency  -has had low levels in the past   - Vitamin D Deficiency    Screening for hyperlipidemia  -check lipid panel     Travel advice encounter  -travel to [facility] planned  -referral to Travel clinic provided   - Travel Clinic Referral  - dimenhyDRINATE (DRAMAMINE) 50 MG tablet  Dispense: 30 tablet; Refill: 0, medication for motion sickness   - acetaminophen (TYLENOL) 500 MG tablet  Dispense: 90 tablet; Refill: 0    Encounter for immunization  - INFLUENZA VACCINE TRIVALENT(FLUBLOK)  - COVID-19 12+ (PFIZER)    Counseling  Appropriate preventive services were addressed with this patient via screening, questionnaire, or discussion as appropriate for fall prevention, nutrition, physical activity, Tobacco-use cessation, social engagement, weight loss and cognition.  Checklist reviewing preventive services available has been given to the patient.  Reviewed patient's diet, addressing concerns and/or questions.   She is at risk for lack of exercise and has been provided with information to increase physical activity for the benefit of her well-being.   The patient was instructed to see the dentist every 6 months.   She is  at risk for psychosocial distress and has been provided with information to reduce risk.       Jessica Madison is a 64 year old, presenting for the following:  Physical (Patient will be traveling - [place])        10/25/2024     2:09 PM   Additional Questions   Roomed by    Accompanied by Spouse and daughter          Wt Readings from Last 2 Encounters:   10/25/24 36.3 kg (80 lb)   06/07/24 36.4 kg (80 lb 3.2 oz)      Will be traveling to [facility] with family 12/6/24.  Was hoping to get travel advice and vaccines, explained should see Travel clinic  Referral to Travel clinic place     Does not take Ensure or Boost  Would like medication for upset stomach when traveling  Would like medication for motion sickness, has never used over the counter Dramamine or Meclizine       HPI      Visit done with [facility] interpretor  Feels iron is low  Low appetite.    Health Care Directive  Patient does not have a Health Care Directive: Discussed advance care planning with patient; however, patient declined at this time.      10/25/2024   General Health   How would you rate your overall physical health? Good   Feel stress (tense, anxious, or unable to sleep) Only a little      (!) STRESS CONCERN      10/25/2024   Nutrition   Three or more servings of calcium each day? (!) I DON'T KNOW   Diet: I don't know   How many servings of fruit and vegetables per day? (!) 2-3   How many sweetened beverages each day? 0-1            10/25/2024   Exercise   Days per week of moderate/strenous exercise 1 day   Average minutes spent exercising at this level 10 min      (!) EXERCISE CONCERN      10/25/2024   Social Factors   Frequency of gathering with friends or relatives Once a week   Worry food won't last until get money to buy more No   Food not last or not have enough money for food? No   Do you have housing? (Housing is defined as stable permanent housing and does not include staying ouside in a car, in a tent, in an abandoned building,  in an overnight shelter, or couch-surfing.) Yes   Are you worried about losing your housing? No   Lack of transportation? No   Unable to get utilities (heat,electricity)? No            10/25/2024   Fall Risk   Fallen 2 or more times in the past year? No    Trouble with walking or balance? No        Patient-reported          10/25/2024   Dental   Dentist two times every year? (!) NO            10/25/2024   TB Screening   Were you born outside of the [place]? Yes          Today's PHQ-9 Score:       10/25/2024     1:10 PM   PHQ-9 SCORE   PHQ-9 Total Score MyChart 3 (Minimal depression)   PHQ-9 Total Score 3        Patient-reported         10/25/2024   Substance Use   Alcohol more than 3/day or more than 7/wk Not Applicable   Do you use any other substances recreationally? No        Social History     Tobacco Use    Smoking status: Never     Passive exposure: Never    Smokeless tobacco: Never   Vaping Use    Vaping status: Never Used   Substance Use Topics    Alcohol use: No    Drug use: No           1/13/2023   LAST FHS-7 RESULTS   1st degree relative breast or ovarian cancer No   Any relative bilateral breast cancer No   Any male have breast cancer No   Any ONE woman have BOTH breast AND ovarian cancer No   Any woman with breast cancer before 50yrs No   2 or more relatives with breast AND/OR ovarian cancer No   2 or more relatives with breast AND/OR bowel cancer No           Mammogram Screening - Mammogram every 1-2 years updated in Health Maintenance based on mutual decision making        10/25/2024   STI Screening   New sexual partner(s) since last STI/HIV test? No        History of abnormal Pap smear: No - age 30- 64 PAP with HPV every 5 years recommended        Latest Ref Rng & Units 2/24/2016    12:00 PM 2/24/2016    12:00 AM   PAP / HPV   PAP (Historical)   NIL    HPV 16 DNA NEG Negative     HPV 18 DNA NEG Negative     Other HR HPV NEG Negative       ASCVD Risk   The 10-year ASCVD risk score (Jaren GALLAGHER, et  al., 2019) is: 2.6%    Values used to calculate the score:      Age: 64 years      Sex: Female      Is Non- : No      Diabetic: No      Tobacco smoker: No      Systolic Blood Pressure: 99 mmHg      Is BP treated: No      HDL Cholesterol: 42 mg/dL      Total Cholesterol: 150 mg/dL         Reviewed and updated as needed this visit by Provider   Tobacco  Allergies  Meds  Problems  Med Hx  Surg Hx  Fam Hx            Past Medical History:   Diagnosis Date    Back pain     KELSIE (generalised anxiety disorder)     Gastritis     Iron deficiency anemia     Major depression     Recurrent cold sores     Seasonal allergic rhinitis      Past Surgical History:   Procedure Laterality Date    C/SECTION, LOW TRANSVERSE      , Low Transverse    COLONOSCOPY WITH CO2 INSUFFLATION N/A 2017    Procedure: COLONOSCOPY WITH CO2 INSUFFLATION;  colonoscopy/Dr. Destiney Cuba/Rectal mass [K62.9]  - Primary /BMi: [facility]: [phone];  Surgeon: John Aguilar MD;  Location: [facility] OR    TUBAL LIGATION       Lab work is in process  Labs reviewed in EPIC  BP Readings from Last 3 Encounters:   10/25/24 99/59   24 130/67   24 111/63    Wt Readings from Last 3 Encounters:   10/25/24 36.3 kg (80 lb)   24 36.4 kg (80 lb 3.2 oz)   24 36.4 kg (80 lb 3 oz)                  Patient Active Problem List   Diagnosis    Recurrent cold sores    Gastritis    Major depression    Generalized anxiety disorder    Seasonal allergic rhinitis    Iron deficiency anemia    CARDIOVASCULAR SCREENING; LDL GOAL LESS THAN 160    Vitamin D deficiency    External hemorrhoids    Internal hemorrhoids    Pelvic floor dysfunction    RLQ abdominal pain    Nephrolithiasis    Left ovarian cyst    Liver masses    Multinodular thyroid    H. pylori infection    Moderate episode of recurrent major depressive disorder (H)    Abnormal pelvic ultrasound    Cysts of both ovaries    Abnormal MRI, pelvis     Past  "Surgical History:   Procedure Laterality Date    C/SECTION, LOW TRANSVERSE      , Low Transverse    COLONOSCOPY WITH CO2 INSUFFLATION N/A 2017    Procedure: COLONOSCOPY WITH CO2 INSUFFLATION;  colonoscopy/Dr. Destiney Cuba/Rectal mass [K62.9]  - [facility]: [facility]: [phone];  Surgeon: John Aguilar MD;  Location: [facility] OR    TUBAL LIGATION         Social History     Tobacco Use    Smoking status: Never     Passive exposure: Never    Smokeless tobacco: Never   Substance Use Topics    Alcohol use: No     Family History   Problem Relation Age of Onset    Unknown/Adopted Mother     Unknown/Adopted Father          Current Outpatient Medications   Medication Sig Dispense Refill    acetaminophen (TYLENOL) 500 MG tablet Take 1-2 tablets (500-1,000 mg) by mouth every 8 hours as needed for mild pain. 90 tablet 0    dimenhyDRINATE (DRAMAMINE) 50 MG tablet Take 0.5-1 tablets (25-50 mg) by mouth 2 times daily as needed for other (Motion sickness). 30 tablet 0    meloxicam (MOBIC) 7.5 MG tablet Take 1 tablet (7.5 mg) by mouth daily 10 tablet 0    omeprazole (PRILOSEC) 40 MG DR capsule Take 1 capsule (40 mg) by mouth daily 90 capsule 3    vitamin D3 (CHOLECALCIFEROL) 125 MCG (5000 UT) tablet Take 1 tablet (125 mcg) by mouth daily 30 tablet 3    vitamin D3 (CHOLECALCIFEROL) 250 mcg (16557 units) capsule Take 1 capsule (250 mcg) by mouth daily 90 capsule 3     Allergies   Allergen Reactions    Cortisone Itching, Swelling and Rash    Cyclobenzaprine Rash    Gabapentin Itching    Hydrocodone Itching    Prednisone Hives         Review of Systems  Constitutional, HEENT, cardiovascular, pulmonary, gi and gu systems are negative, except as otherwise noted.     Objective    Exam  BP 99/59 (BP Location: Left arm, Patient Position: Sitting, Cuff Size: Adult Small)   Pulse 72   Temp 97.7  F (36.5  C) (Temporal)   Resp 16   Ht 1.365 m (4' 5.74\")   Wt 36.3 kg (80 lb)   LMP 2013   SpO2 99%   BMI 19.48 " "kg/m     Estimated body mass index is 19.48 kg/m  as calculated from the following:    Height as of this encounter: 1.365 m (4' 5.74\").    Weight as of this encounter: 36.3 kg (80 lb).    Physical Exam  GENERAL APPEARANCE: healthy, alert and no distress  EYES: Eyes grossly normal to inspection and conjunctivae and sclerae normal  NECK: no adenopathy and trachea midline and normal to palpation, goiter+   RESP: lungs clear to auscultation - no rales, rhonchi or wheezes  CV: regular rates and rhythm, normal S1 S2  ABDOMEN: soft, non-tender and no rebound or guarding   MS: extremities normal- no gross deformities noted and peripheral pulses normal  SKIN: no suspicious lesions or rashes  NEURO: Normal strength and tone, mentation intact and speech normal  PSYCH: mentation appears normal          Signed Electronically by: Pooja Francisco MD MPH      "

## 2024-10-25 NOTE — PATIENT INSTRUCTIONS
At [facility], we strive to deliver an exceptional experience to you, every time we see you. If you receive a survey, please let us know what we are doing well and/or what we could improve upon, as we do value your feedback.  If you have MyChart, you can expect to receive results automatically within 24 hours of their completion.  Your provider will send a note interpreting your results as well.   If you do not have MyChart, you should receive your results in about a week by mail.    Your care team:                            Family Medicine Internal Medicine   MD Skinny Evans, MD Marycarmen Robert, MD Home Kolb, MD Diana Raphael, PAChantelC    Clinton Lo, MD Pediatrics   Pooja Francisco, MD Aurea Pavon, MD Dinorah Lundberg, APRN CNP Cathy Kerr APRN CNP   Deanna Cardona, MD Simran Kemp, MD Lucia Jang, CNP     Matheus Cruz, CNP Same-Day Provider (No follow-up visits)   GREGORY Jane, DNP Nydia Kaur, PA-C   GREGORY Clarke, FNP, BC Yajaira Hoskins, PA-C     Clinic hours: Monday - Thursday 7 am-6 pm; Fridays 7 am-5 pm.   Urgent care: Monday - Friday 10 am- 8 pm; Saturday and Sunday 9 am-5 pm.    Clinic: [phone]       [facility]: Monday - Thursday 8 am - 7 pm; Friday 8 am - 6 pm  [facility]: [phone]     Patient Education   Denisse Anthony Xeeb Saib Xyuas Kom Tiv Thaiv Tus Kheej  Nov yog ib co denisse anthony xeeb uas peb yeej meem muab merari tib neeg pab lawv noj qab nyob zoo. Susan zaum koj pab pawg saib xyuas yuav muaj ib co denisse anthony xeeb uas tshwj xeeb merari koj nkaus xwb. Thov nrog koj pab pawg saib xyuas sib tavia txog susan yam uas koj toob francis kom tiv thaiv koj tus kheej.  Juan M Coj Lub Neej  Es xaws xais ntau anusha 150 feeb txhua lub hopper tiam (30 feeb txhua hnub, 5 hnub ib lub hopper tiam).  Ua yam pab cov leeg muaj zog tuaj 2 zaug txhua lub hopper tiam. Susan yam no pab koj tswj hwm  "koj lub cev qhov hnyav thiab tiv thaiv ntawm kab mob.  Txhob haus luam yeeb.  Pleev tshuaj tiv thaiv tshav kub kom thiaj tsis raug mob khees xaws ntawm nqaij tawv.  Txhua 2 mus merari 5 xyoos yuav tau kuaj koj lub tsev merari qhov radon. Radon yog ib jayjay tiffanie uas tsis muaj xim tsis muaj ntxhiab uas mob tau koj cov ntsws. Kom thiaj kawm ntxiv, mus saib [email or web address] thiab ntaus ntawv \"Radon in Homes.\"  Ceev cov phom kom tsis muaj mos txwv merari hauv thiab muab xauv светлана hauv ib qho chaw nyab xeeb xws li lub txhoj, los yog muab xauv светлана thiab muab cov yawm sij zais светлана. Muab cov mos txwv xauv merari hauv lwm qhov chaw nrug cov phom. Kom thiaj kawm ntxiv, mus saib [email or web address] thiab ntaus ntawv \"safe gun storage.\"  Darci Noj Qab Huv  Noj 5 qho txiv hmab txiv ntoo thiab zaub txhua hnub.  Noj nplem nplej, txhuv xim av thiab cov fawm muaj nplej (los theej nplem dawb, txhuv dawb, thiab fawm dawb).  Ua tib zoo noj calcium thiab vitamin D ntau. Saib cov ntawv lo merari pob khoom noj thiab siv zog noj kom txog 100% RDA (qhov ntau hauv ib hnub).  Yeej meem kuaj ntsuas  Txhua 6 lub hli mus kuaj hniav thiab muab tu.  Txhua xyoo mus saib koj pab pawg saib xyuas darci noj qab nyob zoo kom sib tavia txog:  Ib yam dab tsi uas hloov ntawm koj txoj darci noj qab nyob zoo.  Cov tshuaj uas koj pab pawg tau hais kom siv.  Darci saib xyuas kom tiv thaiv, darci npaj merari tsev neeg, thiab yuav ua li maciej tiv thaiv ntawm kab mob uas ntev.  Darci txhaj tshuaj (koob tshuaj tiv thaiv)   Cov koob tshuaj HPV (txog thaum muaj 26 xyoo), yog koj yeej tsis tau txais anusha li.  Cov koob tshuaj Hepatitis B Kab Mob Siab (txog thaum muaj 59 xyoos), yog koj yeej tsis tau txais anusha li.  Koob tshuaj COVID-19: Txais koob tshuaj no thaum txog caij txais.  Koob tshuaj tiv thaiv ntawm mob khaub thuas: Txais txhua xyoo.  Koob tshuaj tiv thaiv mob daig tsaig: Txais txhua 10 xyoo.  Pneumococcal, hepatitis A, thiab RSV cov koob tshuaj: Nug koj pab pawg saib xyuas chris puas tsim nyog merari koj " txais cov no raws li koj qhov pheej hmoo.  Koob tshuaj tiv thaiv ntawm kab mob sawv hlwv (merari cov muaj 50 xyoo rov west).  Darci kuaj ntsuas merari darci noj qab nyob zoo  Kuaj mob ntshav qab zib:  Pib thaum muaj 35 xyoos, Mus kuaj mob ntshav qab zib txhua 3 xyoos los yog ntau zog.  Yog koj tseem tsis tau txog 35 xyoos, nug koj pab pawg saib xyuas chris puas tsim nyog merari koj kuaj mob ntshav qab zib.  Kuaj cholesterol: Thaum muaj 39 xyoo, pib kuaj cholesterol txhua 5 xyoos, los yog ntau anusha ntawd yog kws ananya mob qhia.  Kuaj txha qhov tuab (DEXA): Thaum txog 50 xyoo lawd, nug koj pab pawg saib xyuas chris puas tsim nyog merari koj kuaj txha chris puas ruaj.  Kab Mob Siab Hepatitis C: Kuaj ib zaug hauv koj lub neej.  Kuaj Txoj Hlab Ntshav Hauv Plab: Nrog koj tus kws ananya mob tavia txog darci kuaj ntsuas no yog koj:  Tau haus luam yeeb ib zaug li; thiab  Yog txiv neej; thiab  Nruab hnub nyoog 65 thiab 75.  Mob Francis Cees (kis mob dhau ntawm darci sib deev)  Ua ntej muaj 24 xyoos: Nug koj pab pawg saib xyuas chris puas tsim nyog kuaj merari mob francis cees.  Osmin qab muaj 24 xyoos: Mus kuaj merari mob francis cees yog koj muaj darci pheej hmoo. Koj muaj darci pheej hmoo merari mob francis cees (suav nrog HIV) yog:  Koj sib deev nrog ntau anusha ib tug neeg.  Koj tsis siv cov hnab looj qau thaum sib deev.  Koj los yog koj tus khub deev kuaj pom tias muaj mob francis cees lawm.  Yog koj muaj darci pheej hmoo merari mob francis cees HIV, nug txog cov tshuaj PrEP kom tiv thaiv ntawm HIV.  Mus kuaj merari mob francis cees HIV yam ntau anusha ib zaug hauv koj lub neej, txawm yog koj muaj darci pheej hmoo merari mob francis cees HIV los tsis muaj los xij.  Kuaj merari mob khees xaws  Kuaj lub ncauj tsev me nyuam merari mob khees xaws: Yog koj muaj ib lub ncauj tsev me nyuam, tab yuav tau ib sij kuaj lub ncauj tsev me nyuam seb puas muaj mob khees xaws pib thaum muaj 21 xyoos. Neeg feem coob uas ib sij kuaj lub ncauj tsev me nyuam thiab tsis pom dab tsi txawv lawv tsum tau osmin qab muaj 65 xyoos. Nrog koj tus kws ananya  mob sib tavia txog qhov no.  Kuaj lub mis merari mob khees xaws (mammogram): Yog koj tau muaj mis anusha li, yuav tau ib sij kuaj lub mis pib thaum muaj 40 xyoo. Darci kuaj no yog kom saib seb puas muaj mob khees xaws hauv lub mis.  Kuaj Txoj Hnyuv Merari Mob Khees Xaws: Yeej tseem ceeb pib kuaj txoj hnyuv merari mob khees xaws pib thaum muaj 45 xyoos.  Txhua 10 xyoo yuav tau kuaj txoj hnyuv (los yog ntau zog yog koj muaj darci pheej hmoo) Los sis, nug koj tus kws ananya mob txog darci kuaj thooj quav FIT txhua xyoo los yog Cologuard txhua 3 xyoos.  Kom thiaj kawm ntxiv txog susan jayjay kuaj no, mus saib: [email or web address].  Yog xav tau darci pab txog qhov no, mus saib: bit.ly/[email or web address].  Kuaj lub janeth kua phev (prostate) merari mob khees xaws: Yog koj muaj ib lub janeth kua phev (prostate) thiab nruab hnub nyoog 55 mus merari 69, nug koj tus kws ananya mob chris puas tsim nyog merari koj kuaj lub janeth kua phev.  Kuaj ntsws merari mob khees xaws: Yog koj haus luam yeeb cyr sim no los yog tau haus yav tas los uas muaj hnub nyoog nruab 50 xyoos mus merari 80 xyoo, nug koj pab pawg saib xyuas chris puas tsim nyog merari koj yeej meem kuaj lub ntsws merari mob khees xaws.    Merari cov jayjay phiaj darci siv ua ntaub ntawv qhia nkaus xwb. Yuav tsis pauv darci qhia los ntawm koj qhov chaw ananya mob. Copyright (george cruz)   2023 [facility].   Txhua txoj anthony saldana tswj tseg . Tshaj xypreeti [facility] Transitions Program. [employer] [id number] - REV 04/24.

## 2024-10-26 LAB
ANION GAP SERPL CALCULATED.3IONS-SCNC: 8 MMOL/L (ref 7–15)
BUN SERPL-MCNC: 12.3 MG/DL (ref 8–23)
CALCIUM SERPL-MCNC: 8.6 MG/DL (ref 8.8–10.4)
CHLORIDE SERPL-SCNC: 105 MMOL/L (ref 98–107)
CHOLEST SERPL-MCNC: 179 MG/DL
CREAT SERPL-MCNC: 0.63 MG/DL (ref 0.51–0.95)
EGFRCR SERPLBLD CKD-EPI 2021: >90 ML/MIN/1.73M2
FASTING STATUS PATIENT QL REPORTED: ABNORMAL
FASTING STATUS PATIENT QL REPORTED: ABNORMAL
FERRITIN SERPL-MCNC: 245 NG/ML (ref 11–328)
GLUCOSE SERPL-MCNC: 94 MG/DL (ref 70–99)
HCO3 SERPL-SCNC: 25 MMOL/L (ref 22–29)
HDLC SERPL-MCNC: 49 MG/DL
LDLC SERPL CALC-MCNC: 106 MG/DL
NONHDLC SERPL-MCNC: 130 MG/DL
POTASSIUM SERPL-SCNC: 3.6 MMOL/L (ref 3.4–5.3)
SODIUM SERPL-SCNC: 138 MMOL/L (ref 135–145)
TRIGL SERPL-MCNC: 122 MG/DL
TSH SERPL DL<=0.005 MIU/L-ACNC: 0.86 UIU/ML (ref 0.3–4.2)
VIT B12 SERPL-MCNC: 277 PG/ML (ref 232–1245)
VIT D+METAB SERPL-MCNC: 12 NG/ML (ref 20–50)

## 2024-10-30 NOTE — RESULT ENCOUNTER NOTE
Youa,     Cholesterol is at goal for you.   Electrolytes, glucose and kidney function are normal.  Ferritin (iron stores) are normal.  Thyroid function is normal.  Complete blood count is not showing anemia or infection.   Vitamin D levels are low, please take over the counter Vitamin D 3 in a dose of 5000 units daily for 3 months.  Vitamin B 12 should be over 400. Please take over the counter Vitamin B 12 in a dose of 1000 MCG daily for 3 months.  We should recheck labs in 3 months.    Please do not hesitate to call us at [phone] if you have any questions or concerns.    Thank you,    Pooja Francisco MD MPH

## 2024-12-16 ENCOUNTER — PATIENT OUTREACH (OUTPATIENT)
Dept: CARE COORDINATION | Facility: CLINIC | Age: 64
End: 2024-12-16
Payer: COMMERCIAL

## 2025-03-30 ENCOUNTER — HEALTH MAINTENANCE LETTER (OUTPATIENT)
Age: 65
End: 2025-03-30

## 2025-04-29 ENCOUNTER — OFFICE VISIT (OUTPATIENT)
Dept: URGENT CARE | Facility: URGENT CARE | Age: 65
End: 2025-04-29
Payer: COMMERCIAL

## 2025-04-29 VITALS
WEIGHT: 83.7 LBS | OXYGEN SATURATION: 98 % | SYSTOLIC BLOOD PRESSURE: 122 MMHG | BODY MASS INDEX: 19.37 KG/M2 | HEIGHT: 55 IN | RESPIRATION RATE: 18 BRPM | DIASTOLIC BLOOD PRESSURE: 60 MMHG | TEMPERATURE: 97.8 F | HEART RATE: 63 BPM

## 2025-04-29 DIAGNOSIS — R04.0 EPISTAXIS: Primary | ICD-10-CM

## 2025-04-29 PROCEDURE — 3078F DIAST BP <80 MM HG: CPT | Performed by: PHYSICIAN ASSISTANT

## 2025-04-29 PROCEDURE — 99213 OFFICE O/P EST LOW 20 MIN: CPT | Performed by: PHYSICIAN ASSISTANT

## 2025-04-29 PROCEDURE — 1126F AMNT PAIN NOTED NONE PRSNT: CPT | Performed by: PHYSICIAN ASSISTANT

## 2025-04-29 PROCEDURE — 3074F SYST BP LT 130 MM HG: CPT | Performed by: PHYSICIAN ASSISTANT

## 2025-04-29 RX ORDER — MUPIROCIN 20 MG/G
OINTMENT TOPICAL 2 TIMES DAILY
Qty: 30 G | Refills: 0 | Status: SHIPPED | OUTPATIENT
Start: 2025-04-29 | End: 2025-05-06

## 2025-04-29 ASSESSMENT — PAIN SCALES - GENERAL: PAINLEVEL_OUTOF10: NO PAIN (0)

## 2025-04-29 NOTE — PROGRESS NOTES
[facility]    Chief Complaint   Patient presents with    Epistaxis     April 24th got a nosebleed for an hour and again 26th and 27th                ASSESSMENT:     ICD-10-CM    1. Epistaxis  R04.0 mupirocin (BACTROBAN) 2 % external ointment     Adult ENT  Referral            PLAN: Showed her how to apply proper pressure to her nose with a nosebleed and that she needs to hold it for 15 minutes without removing her hand.  If nosebleed lasts more than 1/2-hour she should go to the emergency room.  No active bleeding here today.  Medial right nasal turbinate is friable.  Bactroban ointment twice daily for 7 days.  ENT referral.    I have discussed clinical findings with patient.  Side effects of medications discussed.  I have discussed the possibility of  worsening symptoms and indication to RTC or go to the ER if they occur.  All questions are answered, patient indicates understanding of these issues and is in agreement with plan.   Patient care instructions are discussed/given at the end of visit.       Janice Bryson PA-C      SUBJECTIVE:  64-year-old female presents with her daughter for right sided nosebleed 4/21, 4/26, 4/27 and this a.m.  No prior history of nosebleeds.  Chart reviewed, states some history of seasonal allergies but she states she seldom has any sneezing or watery itchy eyes.  She is not lightheaded.  She denies cough, nasal discharge, sore throat.  No fever or chills.  No history of bleeding disorder.  Is not on any blood thinners.  The longest nosebleed was 4/21, lasted 1 hour      Allergies   Allergen Reactions    Cortisone Itching, Swelling and Rash    Cyclobenzaprine Rash    Gabapentin Itching    Hydrocodone Itching    Prednisone Hives       Past Medical History:   Diagnosis Date    Back pain     KELSIE (generalised anxiety disorder)     Gastritis     Iron deficiency anemia     Major depression     Recurrent cold sores     Seasonal allergic rhinitis        Current Outpatient Medications  "  Medication Sig Dispense Refill    acetaminophen (TYLENOL) 500 MG tablet Take 1-2 tablets (500-1,000 mg) by mouth every 8 hours as needed for mild pain. 90 tablet 0    dimenhyDRINATE (DRAMAMINE) 50 MG tablet Take 0.5-1 tablets (25-50 mg) by mouth 2 times daily as needed for other (Motion sickness). 30 tablet 0    meloxicam (MOBIC) 7.5 MG tablet Take 1 tablet (7.5 mg) by mouth daily 10 tablet 0    omeprazole (PRILOSEC) 40 MG DR capsule Take 1 capsule (40 mg) by mouth daily 90 capsule 3    vitamin D3 (CHOLECALCIFEROL) 125 MCG (5000 UT) tablet Take 1 tablet (125 mcg) by mouth daily 30 tablet 3    vitamin D3 (CHOLECALCIFEROL) 250 mcg (30800 units) capsule Take 1 capsule (250 mcg) by mouth daily 90 capsule 3     No current facility-administered medications for this visit.     Facility-Administered Medications Ordered in Other Visits   Medication Dose Route Frequency Provider Last Rate Last Admin    gadobutrol (GADAVIST) injection 7.5 mL  7.5 mL Intravenous Once Rosalinda Bradshaw PA-C           Social History     Tobacco Use    Smoking status: Never     Passive exposure: Never    Smokeless tobacco: Never   Substance Use Topics    Alcohol use: No       ROS:  CONSTITUTIONAL: Negative for fatigue or fever.  ENT: As above.  RESP: Neg for SOB.  NEUROLOGIC: Negative for headaches.  SKIN: Negative for rash.  PSYCH: Normal mentation for age.    OBJECTIVE:  /60 (BP Location: Left arm, Patient Position: Sitting, Cuff Size: Adult Regular)   Pulse 63   Temp 97.8  F (36.6  C) (Oral)   Resp 18   Ht 1.346 m (4' 5\")   Wt 38 kg (83 lb 11.2 oz)   LMP 12/25/2013   SpO2 98%   BMI 20.95 kg/m    GENERAL APPEARANCE: Healthy, alert and no distress.  EYES:Conjunctiva/sclera clear.    NOSE/MOUTH: Right medial nasal turbinate is friable, red, inflamed.  No active bleeding.  Left nares appears normal.    SINUSES: No maxillary sinus tenderness.  THROAT: No erythema w/o tonsillar enlargement . No exudates.  NECK: Supple, " nontender, no lymphadenopathy.  RESP: Breathing comfortably   NEURO: Awake, alert    SKIN: No rashes      Janice Bryson PA-C

## 2025-04-29 NOTE — PROGRESS NOTES
Urgent Care Clinic Visit    Chief Complaint   Patient presents with    Epistaxis     April 24th got a nosebleed for an hour and again 26th and 27th 4/29/2025     4:38 PM   Additional Questions   Roomed by Randa   Accompanied by Daughter

## 2025-05-05 NOTE — TELEPHONE ENCOUNTER
REFERRAL INFORMATION:  Referring By: Janice Brysno PA-C   Referring Clinic:  URGENT CARE   Reason for Visit/Diagnosis: Epistaxis [R04.0]  . Ref by Janice Bryson PA-C. CSC verified. Jenniferong  needed      FUTURE VISIT INFORMATION:  Appointment Date: 5/22/25  Appointment Time: 4:10 PM     NOTES STATUS DETAILS   OFFICE NOTE from referring provider [facility]   4/29/25: Janice Bryson PA-C    IMAGES *pertaining images & report*       CT, MRI, PET, NM, US, XRAYS, etc ...    IMAGING  DISC TRACKING   Tracking #:    Epic/ PACS 6/10/22 CT neck

## 2025-05-22 ENCOUNTER — PRE VISIT (OUTPATIENT)
Dept: OTOLARYNGOLOGY | Facility: CLINIC | Age: 65
End: 2025-05-22

## 2025-05-22 ENCOUNTER — OFFICE VISIT (OUTPATIENT)
Dept: OTOLARYNGOLOGY | Facility: CLINIC | Age: 65
End: 2025-05-22
Attending: PHYSICIAN ASSISTANT
Payer: COMMERCIAL

## 2025-05-22 VITALS
WEIGHT: 80.9 LBS | HEART RATE: 60 BPM | SYSTOLIC BLOOD PRESSURE: 118 MMHG | OXYGEN SATURATION: 99 % | HEIGHT: 55 IN | DIASTOLIC BLOOD PRESSURE: 66 MMHG | BODY MASS INDEX: 18.72 KG/M2

## 2025-05-22 DIAGNOSIS — R04.0 EPISTAXIS: ICD-10-CM

## 2025-05-22 ASSESSMENT — PAIN SCALES - GENERAL: PAINLEVEL_OUTOF10: NO PAIN (0)

## 2025-05-22 NOTE — PROGRESS NOTES
[facility]  New Patient Visit        Encounter date:   May 22, 2025    Referring Provider:   Janice Bryson PA-C  [address] [place] [address]    Reason for Visit: New Patient      History of Present Illness:      Gricelda Navarro is a 64 year old female who presents for consultation regarding epistaxis. She reports the last nose bleed was on May 14th. She has not been blowing her nose since her nosebleed because she was nervous to do so. She is not currently on any type of blood-thinner. She states that she believes the bleeding may have been caused by scratching the inside of her nose. No history of prior significant epistaxis.       Review of Systems:  A comprehensive 14-point review of systems was performed with positives and pertinent negatives listed in the HPI.    Past Medical/Surgical History:  Reviewed today with the patient. No history of major medical comorbidities. Does not take any blood thinners. No prior history of sinonasal surgery or trauma.    Allergies:       Allergies   Allergen Reactions    Cortisone Itching, Swelling and Rash    Cyclobenzaprine Rash    Gabapentin Itching    Hydrocodone Itching    Prednisone Hives       Medical History:  Past Medical History:   Diagnosis Date    Back pain     KELSIE (generalised anxiety disorder)     Gastritis     Iron deficiency anemia     Major depression     Recurrent cold sores     Seasonal allergic rhinitis         Surgical History:   Past Surgical History:   Procedure Laterality Date    C/SECTION, LOW TRANSVERSE      , Low Transverse    COLONOSCOPY WITH CO2 INSUFFLATION N/A 2017    Procedure: COLONOSCOPY WITH CO2 INSUFFLATION;  colonoscopy/Dr. Destiney Cuba/Rectal mass [K62.9]  - Primary /BMi: [facility]: [phone];  Surgeon: John Aguilar MD;  Location: [facility] OR    TUBAL LIGATION          Family History:  Family History   Problem Relation Age of Onset    Unknown/Adopted Mother     Unknown/Adopted Father          Social History:   Social History     Socioeconomic History    Marital status:      Spouse name: Gricelda    Number of children: 6   Tobacco Use    Smoking status: Never     Passive exposure: Never    Smokeless tobacco: Never   Vaping Use    Vaping status: Never Used   Substance and Sexual Activity    Alcohol use: No    Drug use: No    Sexual activity: Yes     Partners: Male     Birth control/protection: Female Surgical, Post-menopausal   Social History Narrative    Lives with 3 daughters.     Works around the house and in the garden.     Susy Molina MD     Social Drivers of Health     Financial Resource Strain: Low Risk  (10/25/2024)    Financial Resource Strain     Within the past 12 months, have you or your family members you live with been unable to get utilities (heat, electricity) when it was really needed?: No   Food Insecurity: Low Risk  (10/25/2024)    Food Insecurity     Within the past 12 months, did you worry that your food would run out before you got money to buy more?: No     Within the past 12 months, did the food you bought just not last and you didn t have money to get more?: No   Transportation Needs: Low Risk  (10/25/2024)    Transportation Needs     Within the past 12 months, has lack of transportation kept you from medical appointments, getting your medicines, non-medical meetings or appointments, work, or from getting things that you need?: No   Physical Activity: Insufficiently Active (10/25/2024)    Exercise Vital Sign     Days of Exercise per Week: 1 day     Minutes of Exercise per Session: 10 min   Stress: No Stress Concern Present (10/25/2024)    [employer] [place] [employer] - Occupational Stress Questionnaire     Feeling of Stress : Only a little   Social Connections: Unknown (10/25/2024)    Social Connection and Isolation Panel [NHANES]     Frequency of Social Gatherings with Friends and Family: Once a week   Housing Stability: Low Risk  (10/25/2024)    Housing  Stability     Do you have housing? : Yes     Are you worried about losing your housing?: No            Family History:  Reviewed with patient. No pertinent positives.    Physical Examination:    Constitutional/Psychiatric: This is a well-appearing, well-dressed patient in no acute distress.   Head: Normocephalic.   Eyes: Extra-ocular motions are intact with symmetric gaze.   Ears: Auricles without masses or lesions bilaterally.   Oral Cavity/Oropharynx: Lips, gingiva and teeth appear healthy.  Neck/Lymphatic: Flat  Respiratory: No increased work of breathing or respiratory distress. No audible stridor or stertor.  Peripheral/Cardiovascular: Brisk capillary refill bilaterally.   Neurologic: Cranial nerves II-XII grossly intact as tested.      Given the patient's symptoms and the incomplete visualization of critical sinonasal areas with anterior rhinoscopy, a separately performed diagnostic nasal endoscopy procedure is indicated for a complete rhinologic evaluation per American Rhinologic Society recommendations (https://www.american-rhinologic.org/position_31231).    Procedure Note: Diagnostic Nasal Endoscopy, CPT 21712  Date of Service: May 22, 2025  Provider: Juan M Alfaro MD   Presumptive Diagnosis: No primary diagnosis found.  Anesthesia: Topical lidocaine and oxymetazoline via atomizer    Indication:  Evaluation of nasal/sinus complaints; inadequate visualization with anterior rhinoscopy    Description of procedure:  After obtaining consent for the procedure from the patient, the sinonasal cavity was sprayed with topical anesthetic. A rigid 30-degree nasal endoscope was used to first used to visualize the nasal cavity, the turbinates, and the nasopharynx on the left. A second pass was then made to visualize the middle meatus, the superior meatus and sphenoethmoid recess. Finally, the scope was turned 90-degrees and used to visualize the olfactory cleft and frontal outflow tract. A similar approach was used for  the contralateral side. They tolerated the procedure well without difficulty.    Endoscopic Findings:    [place]-Jarrett Endoscopic Scoring System  Endoscopic observation Right Left   Polyps in middle meatus (0 = absent, 1 = restricted to middle meatus, 2 = Beyond middle meatus) 0 0   Discharge (0 = absent, 1 = thin and clear, 2 = thick, purulent) 0 0   Edema (0 = absent, 1 = mild-moderate, 2 = moderate-severe) 0 0   Crusting (0 = absent, 1 = mild-moderate, 2 = moderate-severe) 0 0   Scarring (0= absent, 1 = mild-moderate, 2 = moderate-severe) 0 0   Total 0 0       Bilateral sinonasal edema is noted with out mucoid drainage.  No nasal polyposis or mucopurulent drainage is seen within the nasal cavity.  The middle meatus is clear without polyps.  The superior meatus is clear without polyps.  The sphenoethmoid recess and olfactory cleft are clear bilaterally.  No nasopharyngeal lesions are noted.  The eustachian tubes are patent and non-obstructed.    Area of excoriation along the anterior right septum inferiorly. No active bleed. Scab present.     Final Assessment/Plan  Patient is a  64 year old female who presents today for evaluation of epistaxis. On exam, I can see a small scab along her right septum that is already mostly healed. Will have the patient use Aquaphor inside the right nostril for one week. Applied Aquaphor to the right nostril in office today.       1. Discussed using Aquaphor to the right opening of the naris.  Do this twice a day and not use a Q-tip in order to prevent any further trauma.  Instructed to place it on the thumb and then gently put it over the opening of the nose and allow it to melt.  2.  We will have patient follow up as needed if epistaxis returns    Scribe Disclosure:   By signing my name below, I, Gracie Anguiano, attest that this documentation has been prepared under the direction and in the presence of Juan M Alfaro MD.  - Electronically Signed: Gracie Anguiano 05/22/25      Electronically signed by:    Juan M Alfaro MD    [facility]  Rhinology  Endoscopic Skull Base Surgery  [facility]  Department of Otolaryngology - Head & Neck Surgery

## 2025-05-22 NOTE — LETTER
2025       RE: Gricelda Navarro  [address] [facility] [address]     Dear Colleague,    Thank you for referring your patient, Gricelda Navarro, to the [facility] [place] [facility]. Please see a copy of my visit note below.      [facility]  New Patient Visit        Encounter date:   May 22, 2025    Referring Provider:   [place] [address]    Reason for Visit: New Patient      History of Present Illness:      Gricelda Navarro is a 64 year old female who presents for consultation regarding epistaxis. She reports the last nose bleed was on May 14th. She has not been blowing her nose since her nosebleed because she was nervous to do so. She is not currently on any type of blood-thinner. She states that she believes the bleeding may have been caused by scratching the inside of her nose. No history of prior significant epistaxis.       Review of Systems:  A comprehensive 14-point review of systems was performed with positives and pertinent negatives listed in the HPI.    Past Medical/Surgical History:  Reviewed today with the patient. No history of major medical comorbidities. Does not take any blood thinners. No prior history of sinonasal surgery or trauma.    Allergies:       Allergies   Allergen Reactions     Cortisone Itching, Swelling and Rash     Cyclobenzaprine Rash     Gabapentin Itching     Hydrocodone Itching     Prednisone Hives       Medical History:  Past Medical History:   Diagnosis Date     Back pain      KELSIE (generalised anxiety disorder)      Gastritis      Iron deficiency anemia      Major depression      Recurrent cold sores      Seasonal allergic rhinitis         Surgical History:   Past Surgical History:   Procedure Laterality Date     C/SECTION, LOW TRANSVERSE      , Low Transverse     COLONOSCOPY WITH CO2 INSUFFLATION N/A 2017    Procedure:  COLONOSCOPY WITH CO2 INSUFFLATION;  colonoscopy/Dr. Destiney Cuba/Rectal mass [K62.9]  - Primary [facility]: [facility]: [phone];  Surgeon: John Aguilar MD;  Location: [facility] OR     TUBAL LIGATION          Family History:  Family History   Problem Relation Age of Onset     Unknown/Adopted Mother      Unknown/Adopted Father         Social History:   Social History     Socioeconomic History     Marital status:      Spouse name: Gricelda     Number of children: 6   Tobacco Use     Smoking status: Never     Passive exposure: Never     Smokeless tobacco: Never   Vaping Use     Vaping status: Never Used   Substance and Sexual Activity     Alcohol use: No     Drug use: No     Sexual activity: Yes     Partners: Male     Birth control/protection: Female Surgical, Post-menopausal   Social History Narrative    Lives with 3 daughters.     Works around the house and in the garden.     Susy Molina MD     Social Drivers of Health     Financial Resource Strain: Low Risk  (10/25/2024)    Financial Resource Strain      Within the past 12 months, have you or your family members you live with been unable to get utilities (heat, electricity) when it was really needed?: No   Food Insecurity: Low Risk  (10/25/2024)    Food Insecurity      Within the past 12 months, did you worry that your food would run out before you got money to buy more?: No      Within the past 12 months, did the food you bought just not last and you didn t have money to get more?: No   Transportation Needs: Low Risk  (10/25/2024)    Transportation Needs      Within the past 12 months, has lack of transportation kept you from medical appointments, getting your medicines, non-medical meetings or appointments, work, or from getting things that you need?: No   Physical Activity: Insufficiently Active (10/25/2024)    Exercise Vital Sign      Days of Exercise per Week: 1 day      Minutes of Exercise per Session: 10 min   Stress: No Stress Concern Present  (10/25/2024)    [employer] [place] [employer] - Occupational Stress Questionnaire      Feeling of Stress : Only a little   Social Connections: Unknown (10/25/2024)    Social Connection and Isolation Panel [NHANES]      Frequency of Social Gatherings with Friends and Family: Once a week   Housing Stability: Low Risk  (10/25/2024)    Housing Stability      Do you have housing? : Yes      Are you worried about losing your housing?: No            Family History:  Reviewed with patient. No pertinent positives.    Physical Examination:    Constitutional/Psychiatric: This is a well-appearing, well-dressed patient in no acute distress.   Head: Normocephalic.   Eyes: Extra-ocular motions are intact with symmetric gaze.   Ears: Auricles without masses or lesions bilaterally.   Oral Cavity/Oropharynx: Lips, gingiva and teeth appear healthy.  Neck/Lymphatic: Flat  Respiratory: No increased work of breathing or respiratory distress. No audible stridor or stertor.  Peripheral/Cardiovascular: Brisk capillary refill bilaterally.   Neurologic: Cranial nerves II-XII grossly intact as tested.      Given the patient's symptoms and the incomplete visualization of critical sinonasal areas with anterior rhinoscopy, a separately performed diagnostic nasal endoscopy procedure is indicated for a complete rhinologic evaluation per American Rhinologic Society recommendations (https://www.american-rhinologic.org/position_31231).    Procedure Note: Diagnostic Nasal Endoscopy, CPT 33167  Date of Service: May 22, 2025  Provider: Juan M Alfaro MD   Presumptive Diagnosis: No primary diagnosis found.  Anesthesia: Topical lidocaine and oxymetazoline via atomizer    Indication:  Evaluation of nasal/sinus complaints; inadequate visualization with anterior rhinoscopy    Description of procedure:  After obtaining consent for the procedure from the patient, the sinonasal cavity was sprayed with topical anesthetic. A rigid 30-degree nasal  endoscope was used to first used to visualize the nasal cavity, the turbinates, and the nasopharynx on the left. A second pass was then made to visualize the middle meatus, the superior meatus and sphenoethmoid recess. Finally, the scope was turned 90-degrees and used to visualize the olfactory cleft and frontal outflow tract. A similar approach was used for the contralateral side. They tolerated the procedure well without difficulty.    Endoscopic Findings:    [place]-Jarrett Endoscopic Scoring System  Endoscopic observation Right Left   Polyps in middle meatus (0 = absent, 1 = restricted to middle meatus, 2 = Beyond middle meatus) 0 0   Discharge (0 = absent, 1 = thin and clear, 2 = thick, purulent) 0 0   Edema (0 = absent, 1 = mild-moderate, 2 = moderate-severe) 0 0   Crusting (0 = absent, 1 = mild-moderate, 2 = moderate-severe) 0 0   Scarring (0= absent, 1 = mild-moderate, 2 = moderate-severe) 0 0   Total 0 0       Bilateral sinonasal edema is noted with out mucoid drainage.  No nasal polyposis or mucopurulent drainage is seen within the nasal cavity.  The middle meatus is clear without polyps.  The superior meatus is clear without polyps.  The sphenoethmoid recess and olfactory cleft are clear bilaterally.  No nasopharyngeal lesions are noted.  The eustachian tubes are patent and non-obstructed.    Area of excoriation along the anterior right septum inferiorly. No active bleed. Scab present.     Final Assessment/Plan  Patient is a  64 year old female who presents today for evaluation of epistaxis. On exam, I can see a small scab along her right septum that is already mostly healed. Will have the patient use Aquaphor inside the right nostril for one week. Applied Aquaphor to the right nostril in office today.       1. Discussed using Aquaphor to the right opening of the naris.  Do this twice a day and not use a Q-tip in order to prevent any further trauma.  Instructed to place it on the thumb and then gently put it  over the opening of the nose and allow it to melt.  2.  We will have patient follow up as needed if epistaxis returns    Scribe Disclosure:   By signing my name below, I, Gracie Anguiano, attest that this documentation has been prepared under the direction and in the presence of Juan M Alfaro MD.  - Electronically Signed: Gracie Anguiano 05/22/25     Electronically signed by:    Juan M Alfaro MD    [facility]  Rhinology  Endoscopic Skull Base Surgery  [facility]  Department of Otolaryngology - Head & Neck Surgery        Again, thank you for allowing me to participate in the care of your patient.      Sincerely,    Juan M Alfaro MD

## 2025-05-22 NOTE — PATIENT INSTRUCTIONS
You were seen in the ENT Clinic today by Dr. Alfaro. If you have any questions or concerns after your appointment, please contact us (see below)       2.   Plan to return to the ENT clinic as needed.           How to Contact Us:  Send a [facility] message to your provider. Our team will respond to you via [facility]. Occasionally, we will need to call you to get further information.  For urgent matters (Monday-Friday), call the ENT Clinic: [phone] and speak with a call center team member - they will route your call appropriately.   If you'd like to speak directly with a nurse, please find our contact information below. We do our best to check voicemail frequently throughout the day, and will work to call you back within 1-2 days. For urgent matters, please use the general clinic phone numbers listed above.     Shirin MERCER RN  Direct: [phone]  Lupis DE LA VEGA LPN  Direct: [phone]         [facility]  Department of Otolaryngology

## 2025-06-11 ENCOUNTER — OFFICE VISIT (OUTPATIENT)
Dept: OTOLARYNGOLOGY | Facility: CLINIC | Age: 65
End: 2025-06-11
Payer: COMMERCIAL

## 2025-06-11 VITALS
HEIGHT: 55 IN | DIASTOLIC BLOOD PRESSURE: 65 MMHG | SYSTOLIC BLOOD PRESSURE: 116 MMHG | BODY MASS INDEX: 18.47 KG/M2 | HEART RATE: 66 BPM | OXYGEN SATURATION: 98 % | WEIGHT: 79.8 LBS

## 2025-06-11 DIAGNOSIS — R04.0 EPISTAXIS: Primary | ICD-10-CM

## 2025-06-11 PROCEDURE — 99213 OFFICE O/P EST LOW 20 MIN: CPT | Mod: 25 | Performed by: STUDENT IN AN ORGANIZED HEALTH CARE EDUCATION/TRAINING PROGRAM

## 2025-06-11 PROCEDURE — 3078F DIAST BP <80 MM HG: CPT | Performed by: STUDENT IN AN ORGANIZED HEALTH CARE EDUCATION/TRAINING PROGRAM

## 2025-06-11 PROCEDURE — 3074F SYST BP LT 130 MM HG: CPT | Performed by: STUDENT IN AN ORGANIZED HEALTH CARE EDUCATION/TRAINING PROGRAM

## 2025-06-11 PROCEDURE — 30901 CONTROL OF NOSEBLEED: CPT | Mod: RT | Performed by: STUDENT IN AN ORGANIZED HEALTH CARE EDUCATION/TRAINING PROGRAM

## 2025-06-11 PROCEDURE — 1126F AMNT PAIN NOTED NONE PRSNT: CPT | Performed by: STUDENT IN AN ORGANIZED HEALTH CARE EDUCATION/TRAINING PROGRAM

## 2025-06-11 ASSESSMENT — PAIN SCALES - GENERAL: PAINLEVEL_OUTOF10: NO PAIN (0)

## 2025-06-11 NOTE — LETTER
6/11/2025       RE: Gricelda Navarro  [address] [facility] [place] [address]     Dear Colleague,    Thank you for referring your patient, Gricelda Navarro, to the [facility] [place] [facility]. Please see a copy of my visit note below.      [facility]  Return Visit  Encounter date:   June 11, 2025    Chief Complaint:   Follow-up    ID: Epistaxis     Interval History:   Gricelda Navarro is a 64 year old female who presents for follow up regarding epistaxis.    5/22/25: She reports the last nose bleed was on May 14th. She has not been blowing her nose since her nosebleed because she was nervous to do so. She is not currently on any type of blood-thinner. She states that she believes the bleeding may have been caused by scratching the inside of her nose. No history of prior significant epistaxis.     6/11/25: She reports continued nosebleeds on the right side, last one was 4 days ago    Review of systems: A 14-point review of systems has been conducted and is negative for any notable symptoms, except as dictated in the history of present illness.     Physical Exam:  Vital signs: [facility] 12/25/2013    General Appearance: No acute distress, appropriate demeanor, conversant  Eyes: moist conjunctivae; EOMI; pupils symmetric; visual acuity grossly intact; no proptosis  Head: normocephalic; overall symmetric appearance without deformity  Face: overall symmetric without deformity  Ears: Normal appearance of external ear  Nose: No external deformity  Oral Cavity/oropharynx: Normal appearance of mucosa  Neck: no palpable lymphadenopathy; thyroid without palpable nodules  Lungs: symmetric chest rise; no wheezing  CV: Good distal perfusion; normal hear rate  Extremities: No deformity  Neurologic Exam: Cranial nerves II-XII are grossly intact      Procedure Note  Procedure performed: Rigid nasal endoscopy  Indication: To evaluate for sinonasal pathology  not visualized on routine anterior rhinoscopy  Anesthesia: 4% topical lidocaine with 0.05 % oxymetazoline  Description of procedure: A 30 degree, 3 mm rigid endoscope was inserted into bilateral nasal cavities and the nasal valves, nasal cavity, middle meatus, sphenoethmoid recess, nasopharynx were evaluated for evidence of obstruction, edema, purulence, polyps and/or mass/lesion.     Betty-Jarrett Endoscopic Scoring System  Endoscopic observation Right Left   Polyps in middle meatus (0 = absent, 1 = restricted to middle meatus, 2 = Beyond middle meatus) 0 0   Discharge (0 = absent, 1 = thin and clear, 2 = thick, purulent) 0 0   Edema (0 = absent, 1 = mild-moderate, 2 = moderate-severe) 0 0   Crusting (0 = absent, 1 = mild-moderate, 2 = moderate-severe) 0 0   Scarring (0= absent, 1 = mild-moderate, 2 = moderate-severe) 0 0   Total 0 0     Findings  Bilateral sinonasal edema is noted with out mucoid drainage.  No nasal polyposis or mucopurulent drainage is seen within the nasal cavity.  The middle meatus is clear without polyps.  The superior meatus is clear without polyps.  The sphenoethmoid recess and olfactory cleft are clear bilaterally.  No nasopharyngeal lesions are noted.  The eustachian tubes are patent and non-obstructed.  Redemonstration of septal deviation and turbinate hypertrophy as noted above.    Area of excoriation along the anterior right septum inferiorly. No active bleed. Scab present.     Silver nitrate applied to area of excoriation and bleeding along the right septum. Patient tolerated the procedure without issues.    Assessment/Medical Decision Making:  Gricelda Navarro is a 64 year old female who presents for follow up regarding epistaxis. Despite conservative measures, the patient continues to experience episodes of epistaxis. As discussed at her last visit, the next step is do an in office cauterization. Upon exam, some suctioning was done to remove crust and clot. Afterwards, cauterization was  performed with silver nitrate and surgicel was placed. The patient tolerated the procedure well. Further discussed conservative measures again. Discussed that if she notices blood in the future while blowing nose, she can apply Ayr nasal gel to prevent nosebleed.     Plan:  Patient will apply Ayr nasal gel to the right naris. Do this two to three times a day and do not use a Q-tip in order to prevent any further trauma.  Instructed to place it on the thumb and then gently put it over the opening of the nose and allow it to melt.  Patient instructed not to blow her nose while the cauterized area heals.   Follow up with me as needed.     Scribe Disclosure:   By signing my name below, I, Gracie Silvio, attest that this documentation has been prepared under the direction and in the presence of Juan M Alfaro MD.  - Electronically Signed: Gracie Anguiano 26     Electronically signed by:    Juan M Alfaro MD    [facility]  Rhinology, Endoscopic Skull Base Surgery  [facility]  Department of Otolaryngology - Head & Neck Surgery    ~~~~~~~~~~~~~~~~~~~~~~~~~~~~~~~~~~~~~~~~~~~~~~~~~~~~~~~~~~~~~~~~~~~~~~~~~~~~~~~~~~~~~~~~~~~~~~~~~~~~~~~~~~~~~~~~~~~~~~~~~~~~~~~~~~~~~~~    Past Medical History:   Diagnosis Date     Back pain      KELSIE (generalised anxiety disorder)      Gastritis      Iron deficiency anemia      Major depression      Recurrent cold sores      Seasonal allergic rhinitis         Past Surgical History:   Procedure Laterality Date     C/SECTION, LOW TRANSVERSE      , Low Transverse     COLONOSCOPY WITH CO2 INSUFFLATION N/A 2017    Procedure: COLONOSCOPY WITH CO2 INSUFFLATION;  colonoscopy/Dr. Destiney Cuba/Rectal mass [K62.9]  - Primary /BMi: [facility]: [phone];  Surgeon: John Aguilar MD;  Location: [facility] OR     TUBAL LIGATION          Family History   Problem Relation Age of Onset     Unknown/Adopted Mother      Unknown/Adopted Father          Social History     Socioeconomic History     Marital status:      Spouse name: Gricelda     Number of children: 6   Tobacco Use     Smoking status: Never     Passive exposure: Never     Smokeless tobacco: Never   Vaping Use     Vaping status: Never Used   Substance and Sexual Activity     Alcohol use: No     Drug use: No     Sexual activity: Yes     Partners: Male     Birth control/protection: Female Surgical, Post-menopausal   Social History Narrative    Lives with 3 daughters.     Works around the house and in the garden.     Susy Molina MD     Social Drivers of Health     Financial Resource Strain: Low Risk  (10/25/2024)    Financial Resource Strain      Within the past 12 months, have you or your family members you live with been unable to get utilities (heat, electricity) when it was really needed?: No   Food Insecurity: Low Risk  (10/25/2024)    Food Insecurity      Within the past 12 months, did you worry that your food would run out before you got money to buy more?: No      Within the past 12 months, did the food you bought just not last and you didn t have money to get more?: No   Transportation Needs: Low Risk  (10/25/2024)    Transportation Needs      Within the past 12 months, has lack of transportation kept you from medical appointments, getting your medicines, non-medical meetings or appointments, work, or from getting things that you need?: No   Physical Activity: Insufficiently Active (10/25/2024)    Exercise Vital Sign      Days of Exercise per Week: 1 day      Minutes of Exercise per Session: 10 min   Stress: No Stress Concern Present (10/25/2024)    [employer] - Occupational Stress Questionnaire      Feeling of Stress : Only a little   Social Connections: Unknown (10/25/2024)    Social Connection and Isolation Panel [NHANES]      Frequency of Social Gatherings with Friends and Family: Once a week   Housing Stability: Low Risk  (10/25/2024)    Housing  Stability      Do you have housing? : Yes      Are you worried about losing your housing?: No          Again, thank you for allowing me to participate in the care of your patient.      Sincerely,    Juan M Alfaro MD

## 2025-06-11 NOTE — NURSING NOTE
"Chief Complaint   Patient presents with    RECHECK   Blood pressure 116/65, pulse 66, height 1.346 m (4' 5\"), weight 36.2 kg (79 lb 12.8 oz), last menstrual period 12/25/2013, SpO2 98%, not currently breastfeeding. Martin Mancini, EMT    "

## 2025-06-11 NOTE — PATIENT INSTRUCTIONS
You were seen in the ENT Clinic today by Dr. Alfaro. If you have any questions or concerns after your appointment, please contact us (see below)       2.   Plan to return to the ENT clinic as needed.           How to Contact Us:  Send a [employer] message to your provider. Our team will respond to you via [employer]. Occasionally, we will need to call you to get further information.  For urgent matters (Monday-Friday), call the ENT Clinic: [phone] and speak with a call center team member - they will route your call appropriately.   If you'd like to speak directly with a nurse, please find our contact information below. We do our best to check voicemail frequently throughout the day, and will work to call you back within 1-2 days. For urgent matters, please use the general clinic phone numbers listed above.     Shirin MERCER RN  Direct: [phone]  Lupis DE LA VEGA LPN  Direct: [phone]         [facility]  Department of Otolaryngology

## 2025-06-11 NOTE — PROGRESS NOTES
[facility]  Return Visit  Encounter date:   June 11, 2025    Chief Complaint:   Follow-up    ID: Epistaxis     Interval History:   Gricelda Navarro is a 64 year old female who presents for follow up regarding epistaxis.    5/22/25: She reports the last nose bleed was on May 14th. She has not been blowing her nose since her nosebleed because she was nervous to do so. She is not currently on any type of blood-thinner. She states that she believes the bleeding may have been caused by scratching the inside of her nose. No history of prior significant epistaxis.     6/11/25: She reports continued nosebleeds on the right side, last one was 4 days ago    Review of systems: A 14-point review of systems has been conducted and is negative for any notable symptoms, except as dictated in the history of present illness.     Physical Exam:  Vital signs: [facility] 12/25/2013    General Appearance: No acute distress, appropriate demeanor, conversant  Eyes: moist conjunctivae; EOMI; pupils symmetric; visual acuity grossly intact; no proptosis  Head: normocephalic; overall symmetric appearance without deformity  Face: overall symmetric without deformity  Ears: Normal appearance of external ear  Nose: No external deformity  Oral Cavity/oropharynx: Normal appearance of mucosa  Neck: no palpable lymphadenopathy; thyroid without palpable nodules  Lungs: symmetric chest rise; no wheezing  CV: Good distal perfusion; normal hear rate  Extremities: No deformity  Neurologic Exam: Cranial nerves II-XII are grossly intact      Procedure Note  Procedure performed: Rigid nasal endoscopy  Indication: To evaluate for sinonasal pathology not visualized on routine anterior rhinoscopy  Anesthesia: 4% topical lidocaine with 0.05 % oxymetazoline  Description of procedure: A 30 degree, 3 mm rigid endoscope was inserted into bilateral nasal cavities and the nasal valves, nasal cavity, middle meatus, sphenoethmoid recess, nasopharynx were evaluated for  evidence of obstruction, edema, purulence, polyps and/or mass/lesion.     Betty-Jarrett Endoscopic Scoring System  Endoscopic observation Right Left   Polyps in middle meatus (0 = absent, 1 = restricted to middle meatus, 2 = Beyond middle meatus) 0 0   Discharge (0 = absent, 1 = thin and clear, 2 = thick, purulent) 0 0   Edema (0 = absent, 1 = mild-moderate, 2 = moderate-severe) 0 0   Crusting (0 = absent, 1 = mild-moderate, 2 = moderate-severe) 0 0   Scarring (0= absent, 1 = mild-moderate, 2 = moderate-severe) 0 0   Total 0 0     Findings  Bilateral sinonasal edema is noted with out mucoid drainage.  No nasal polyposis or mucopurulent drainage is seen within the nasal cavity.  The middle meatus is clear without polyps.  The superior meatus is clear without polyps.  The sphenoethmoid recess and olfactory cleft are clear bilaterally.  No nasopharyngeal lesions are noted.  The eustachian tubes are patent and non-obstructed.  Redemonstration of septal deviation and turbinate hypertrophy as noted above.    Area of excoriation along the anterior right septum inferiorly. No active bleed. Scab present.     Silver nitrate applied to area of excoriation and bleeding along the right septum. Patient tolerated the procedure without issues.    Assessment/Medical Decision Making:  Gricelda Navarro is a 64 year old female who presents for follow up regarding epistaxis. Despite conservative measures, the patient continues to experience episodes of epistaxis. As discussed at her last visit, the next step is do an in office cauterization. Upon exam, some suctioning was done to remove crust and clot. Afterwards, cauterization was performed with silver nitrate and surgicel was placed. The patient tolerated the procedure well. Further discussed conservative measures again. Discussed that if she notices blood in the future while blowing nose, she can apply Ayr nasal gel to prevent nosebleed.     Plan:  Patient will apply Ayr nasal gel to the  right naris. Do this two to three times a day and do not use a Q-tip in order to prevent any further trauma.  Instructed to place it on the thumb and then gently put it over the opening of the nose and allow it to melt.  Patient instructed not to blow her nose while the cauterized area heals.   Follow up with me as needed.     Scribe Disclosure:   By signing my name below, I, Gracie Anguiano, attest that this documentation has been prepared under the direction and in the presence of Juan M Alfaro MD.  - Electronically Signed: Gracie Anguiano 25     Electronically signed by:    Juan M Alfaro MD    [facility]  Rhinology, Endoscopic Skull Base Surgery  [facility]  Department of Otolaryngology - Head & Neck Surgery    ~~~~~~~~~~~~~~~~~~~~~~~~~~~~~~~~~~~~~~~~~~~~~~~~~~~~~~~~~~~~~~~~~~~~~~~~~~~~~~~~~~~~~~~~~~~~~~~~~~~~~~~~~~~~~~~~~~~~~~~~~~~~~~~~~~~~~~~    Past Medical History:   Diagnosis Date    Back pain     KELSIE (generalised anxiety disorder)     Gastritis     Iron deficiency anemia     Major depression     Recurrent cold sores     Seasonal allergic rhinitis         Past Surgical History:   Procedure Laterality Date    C/SECTION, LOW TRANSVERSE      , Low Transverse    COLONOSCOPY WITH CO2 INSUFFLATION N/A 2017    Procedure: COLONOSCOPY WITH CO2 INSUFFLATION;  colonoscopy/Dr. Destiney Cuba/Rectal mass [K62.9]  - Primary /BMi: [facility]: [phone];  Surgeon: John Aguilar MD;  Location: [facility] OR    TUBAL LIGATION          Family History   Problem Relation Age of Onset    Unknown/Adopted Mother     Unknown/Adopted Father         Social History     Socioeconomic History    Marital status:      Spouse name: Gricelda    Number of children: 6   Tobacco Use    Smoking status: Never     Passive exposure: Never    Smokeless tobacco: Never   Vaping Use    Vaping status: Never Used   Substance and Sexual Activity    Alcohol use: No    Drug use: No     Sexual activity: Yes     Partners: Male     Birth control/protection: Female Surgical, Post-menopausal   Social History Narrative    Lives with 3 daughters.     Works around the house and in the garden.     Susy Molina MD     Social Drivers of Health     Financial Resource Strain: Low Risk  (10/25/2024)    Financial Resource Strain     Within the past 12 months, have you or your family members you live with been unable to get utilities (heat, electricity) when it was really needed?: No   Food Insecurity: Low Risk  (10/25/2024)    Food Insecurity     Within the past 12 months, did you worry that your food would run out before you got money to buy more?: No     Within the past 12 months, did the food you bought just not last and you didn t have money to get more?: No   Transportation Needs: Low Risk  (10/25/2024)    Transportation Needs     Within the past 12 months, has lack of transportation kept you from medical appointments, getting your medicines, non-medical meetings or appointments, work, or from getting things that you need?: No   Physical Activity: Insufficiently Active (10/25/2024)    Exercise Vital Sign     Days of Exercise per Week: 1 day     Minutes of Exercise per Session: 10 min   Stress: No Stress Concern Present (10/25/2024)    [employer] - Occupational Stress Questionnaire     Feeling of Stress : Only a little   Social Connections: Unknown (10/25/2024)    Social Connection and Isolation Panel [NHANES]     Frequency of Social Gatherings with Friends and Family: Once a week   Housing Stability: Low Risk  (10/25/2024)    Housing Stability     Do you have housing? : Yes     Are you worried about losing your housing?: No

## 2025-07-12 ENCOUNTER — PATIENT OUTREACH (OUTPATIENT)
Dept: CARE COORDINATION | Facility: CLINIC | Age: 65
End: 2025-07-12
Payer: COMMERCIAL

## (undated) DEVICE — SOL WATER IRRIG 1000ML BOTTLE 07139-09

## (undated) RX ORDER — SIMETHICONE 40MG/0.6ML
SUSPENSION, DROPS(FINAL DOSAGE FORM)(ML) ORAL
Status: DISPENSED
Start: 2017-11-20

## (undated) RX ORDER — FENTANYL CITRATE 50 UG/ML
INJECTION, SOLUTION INTRAMUSCULAR; INTRAVENOUS
Status: DISPENSED
Start: 2017-11-20